# Patient Record
Sex: MALE | Race: WHITE | Employment: OTHER | URBAN - METROPOLITAN AREA
[De-identification: names, ages, dates, MRNs, and addresses within clinical notes are randomized per-mention and may not be internally consistent; named-entity substitution may affect disease eponyms.]

---

## 2017-01-23 ENCOUNTER — HOSPITAL ENCOUNTER (OUTPATIENT)
Dept: NON INVASIVE DIAGNOSTICS | Age: 62
Discharge: HOME | End: 2017-01-23
Attending: NURSE PRACTITIONER | Admitting: NURSE PRACTITIONER

## 2017-01-23 LAB
ALBUMIN SERPL-MCNC: 3.8 G/DL (ref 3.4–5)
ALP BLD-CCNC: 104 U/L (ref 40–129)
ALT SERPL-CCNC: 19 U/L (ref 10–40)
AST SERPL-CCNC: 25 U/L (ref 15–37)
BILIRUB SERPL-MCNC: 0.3 MG/DL (ref 0–1)
BILIRUBIN DIRECT: <0.2 MG/DL (ref 0–0.3)
BILIRUBIN, INDIRECT: NORMAL MG/DL (ref 0–1)
HCT VFR BLD CALC: 43.9 % (ref 40.5–52.5)
HEMOGLOBIN: 14.8 G/DL (ref 13.5–17.5)
MCH RBC QN AUTO: 35.1 PG (ref 26–34)
MCHC RBC AUTO-ENTMCNC: 33.6 G/DL (ref 31–36)
MCV RBC AUTO: 104.6 FL (ref 80–100)
PDW BLD-RTO: 13.5 % (ref 12.4–15.4)
PLATELET # BLD: 230 K/UL (ref 135–450)
PMV BLD AUTO: 9.4 FL (ref 5–10.5)
RBC # BLD: 4.2 M/UL (ref 4.2–5.9)
TOTAL PROTEIN: 6.6 G/DL (ref 6.4–8.2)
WBC # BLD: 7.3 K/UL (ref 4–11)

## 2019-04-14 ENCOUNTER — APPOINTMENT (OUTPATIENT)
Dept: CT IMAGING | Age: 64
DRG: 480 | End: 2019-04-14
Payer: MEDICARE

## 2019-04-14 ENCOUNTER — APPOINTMENT (OUTPATIENT)
Dept: GENERAL RADIOLOGY | Age: 64
DRG: 480 | End: 2019-04-14
Payer: MEDICARE

## 2019-04-14 ENCOUNTER — HOSPITAL ENCOUNTER (INPATIENT)
Age: 64
LOS: 8 days | Discharge: NURSING, REHAB OR LONG-TERM CARE | DRG: 480 | End: 2019-04-22
Attending: EMERGENCY MEDICINE | Admitting: INTERNAL MEDICINE
Payer: MEDICARE

## 2019-04-14 DIAGNOSIS — F10.231 ALCOHOL DEPENDENCE WITH WITHDRAWAL DELIRIUM (HCC): ICD-10-CM

## 2019-04-14 DIAGNOSIS — S22.42XA CLOSED FRACTURE OF MULTIPLE RIBS OF LEFT SIDE, INITIAL ENCOUNTER: ICD-10-CM

## 2019-04-14 DIAGNOSIS — S42.201A CLOSED FRACTURE OF PROXIMAL END OF RIGHT HUMERUS, UNSPECIFIED FRACTURE MORPHOLOGY, INITIAL ENCOUNTER: ICD-10-CM

## 2019-04-14 DIAGNOSIS — S72.121A CLOSED DISPLACED FRACTURE OF LESSER TROCHANTER OF RIGHT FEMUR, INITIAL ENCOUNTER (HCC): Primary | ICD-10-CM
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
## 2019-04-14 PROBLEM — S72.001A CLOSED FRACTURE OF NECK OF RIGHT FEMUR (HCC): Status: ACTIVE | Noted: 2019-04-14

## 2019-04-14 PROBLEM — S72.91XA RIGHT FEMORAL FRACTURE (HCC): Status: ACTIVE | Noted: 2019-04-14
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
## 2019-04-14 PROBLEM — F10.929 ALCOHOL INTOXICATION (HCC): Status: ACTIVE | Noted: 2019-04-14

## 2019-04-14 PROBLEM — F10.20 ALCOHOL DEPENDENCE (HCC): Status: ACTIVE | Noted: 2019-04-14

## 2019-04-14 PROBLEM — G40.909 EPILEPSIA (HCC): Status: ACTIVE | Noted: 2019-04-14

## 2019-04-14 PROBLEM — E83.42 HYPOMAGNESEMIA: Status: ACTIVE | Noted: 2019-04-14

## 2019-04-14 PROBLEM — S22.49XA MULTIPLE RIB FRACTURES: Status: ACTIVE | Noted: 2019-04-14

## 2019-04-14 PROBLEM — S42.301A RIGHT HUMERAL FRACTURE: Status: ACTIVE | Noted: 2019-04-14

## 2019-04-14 LAB
A/G RATIO: 1 (ref 1.1–2.2)
ALBUMIN SERPL-MCNC: 3 G/DL (ref 3.4–5)
ALP BLD-CCNC: 144 U/L (ref 40–129)
ALT SERPL-CCNC: 30 U/L (ref 10–40)
ANION GAP SERPL CALCULATED.3IONS-SCNC: 16 MMOL/L (ref 3–16)
AST SERPL-CCNC: 40 U/L (ref 15–37)
BILIRUB SERPL-MCNC: 0.4 MG/DL (ref 0–1)
BUN BLDV-MCNC: 7 MG/DL (ref 7–20)
CALCIUM SERPL-MCNC: 8 MG/DL (ref 8.3–10.6)
CHLORIDE BLD-SCNC: 93 MMOL/L (ref 99–110)
CO2: 24 MMOL/L (ref 21–32)
CREAT SERPL-MCNC: <0.5 MG/DL (ref 0.8–1.3)
ETHANOL: 75 MG/DL (ref 0–0.08)
GFR AFRICAN AMERICAN: >60
GFR NON-AFRICAN AMERICAN: >60
GLOBULIN: 3 G/DL
GLUCOSE BLD-MCNC: 105 MG/DL (ref 70–99)
HCT VFR BLD CALC: 37.5 % (ref 40.5–52.5)
HEMOGLOBIN: 12.5 G/DL (ref 13.5–17.5)
MAGNESIUM: 1.7 MG/DL (ref 1.8–2.4)
MCH RBC QN AUTO: 34.3 PG (ref 26–34)
MCHC RBC AUTO-ENTMCNC: 33.4 G/DL (ref 31–36)
MCV RBC AUTO: 102.8 FL (ref 80–100)
PDW BLD-RTO: 13.7 % (ref 12.4–15.4)
PLATELET # BLD: 222 K/UL (ref 135–450)
PMV BLD AUTO: 9.7 FL (ref 5–10.5)
POTASSIUM REFLEX MAGNESIUM: 3.5 MMOL/L (ref 3.5–5.1)
RBC # BLD: 3.64 M/UL (ref 4.2–5.9)
SODIUM BLD-SCNC: 133 MMOL/L (ref 136–145)
TOTAL CK: 149 U/L (ref 39–308)
TOTAL PROTEIN: 6 G/DL (ref 6.4–8.2)
TROPONIN: <0.01 NG/ML
WBC # BLD: 14.4 K/UL (ref 4–11)

## 2019-04-14 PROCEDURE — 6360000002 HC RX W HCPCS: Performed by: ORTHOPAEDIC SURGERY

## 2019-04-14 PROCEDURE — 96375 TX/PRO/DX INJ NEW DRUG ADDON: CPT

## 2019-04-14 PROCEDURE — 99285 EMERGENCY DEPT VISIT HI MDM: CPT

## 2019-04-14 PROCEDURE — 6370000000 HC RX 637 (ALT 250 FOR IP): Performed by: INTERNAL MEDICINE

## 2019-04-14 PROCEDURE — 96374 THER/PROPH/DIAG INJ IV PUSH: CPT

## 2019-04-14 PROCEDURE — 2580000003 HC RX 258: Performed by: INTERNAL MEDICINE

## 2019-04-14 PROCEDURE — 73502 X-RAY EXAM HIP UNI 2-3 VIEWS: CPT

## 2019-04-14 PROCEDURE — 1200000000 HC SEMI PRIVATE

## 2019-04-14 PROCEDURE — 71045 X-RAY EXAM CHEST 1 VIEW: CPT

## 2019-04-14 PROCEDURE — 82550 ASSAY OF CK (CPK): CPT

## 2019-04-14 PROCEDURE — 6370000000 HC RX 637 (ALT 250 FOR IP): Performed by: ORTHOPAEDIC SURGERY

## 2019-04-14 PROCEDURE — 93010 ELECTROCARDIOGRAM REPORT: CPT | Performed by: INTERNAL MEDICINE

## 2019-04-14 PROCEDURE — G0480 DRUG TEST DEF 1-7 CLASSES: HCPCS

## 2019-04-14 PROCEDURE — 6360000002 HC RX W HCPCS: Performed by: EMERGENCY MEDICINE

## 2019-04-14 PROCEDURE — 99222 1ST HOSP IP/OBS MODERATE 55: CPT | Performed by: ORTHOPAEDIC SURGERY

## 2019-04-14 PROCEDURE — 84484 ASSAY OF TROPONIN QUANT: CPT

## 2019-04-14 PROCEDURE — 73030 X-RAY EXAM OF SHOULDER: CPT

## 2019-04-14 PROCEDURE — 94760 N-INVAS EAR/PLS OXIMETRY 1: CPT

## 2019-04-14 PROCEDURE — 6360000002 HC RX W HCPCS: Performed by: INTERNAL MEDICINE

## 2019-04-14 PROCEDURE — 80053 COMPREHEN METABOLIC PANEL: CPT

## 2019-04-14 PROCEDURE — 93005 ELECTROCARDIOGRAM TRACING: CPT | Performed by: EMERGENCY MEDICINE

## 2019-04-14 PROCEDURE — 73552 X-RAY EXAM OF FEMUR 2/>: CPT

## 2019-04-14 PROCEDURE — 83735 ASSAY OF MAGNESIUM: CPT

## 2019-04-14 PROCEDURE — 96376 TX/PRO/DX INJ SAME DRUG ADON: CPT

## 2019-04-14 PROCEDURE — 94664 DEMO&/EVAL PT USE INHALER: CPT

## 2019-04-14 PROCEDURE — 85027 COMPLETE CBC AUTOMATED: CPT

## 2019-04-14 PROCEDURE — 70450 CT HEAD/BRAIN W/O DYE: CPT

## 2019-04-14 RX ORDER — OXYCODONE HYDROCHLORIDE 5 MG/1
5 TABLET ORAL EVERY 4 HOURS PRN
Status: DISCONTINUED | OUTPATIENT
Start: 2019-04-14 | End: 2019-04-14

## 2019-04-14 RX ORDER — MORPHINE SULFATE 4 MG/ML
4 INJECTION, SOLUTION INTRAMUSCULAR; INTRAVENOUS
Status: DISCONTINUED | OUTPATIENT
Start: 2019-04-14 | End: 2019-04-22 | Stop reason: HOSPADM

## 2019-04-14 RX ORDER — CYCLOBENZAPRINE HCL 10 MG
10 TABLET ORAL 3 TIMES DAILY PRN
Status: DISCONTINUED | OUTPATIENT
Start: 2019-04-14 | End: 2019-04-22 | Stop reason: HOSPADM

## 2019-04-14 RX ORDER — FAMOTIDINE 20 MG/1
20 TABLET, FILM COATED ORAL 2 TIMES DAILY
Status: DISCONTINUED | OUTPATIENT
Start: 2019-04-14 | End: 2019-04-22 | Stop reason: HOSPADM

## 2019-04-14 RX ORDER — THIAMINE HYDROCHLORIDE 100 MG/ML
100 INJECTION, SOLUTION INTRAMUSCULAR; INTRAVENOUS DAILY
Status: DISCONTINUED | OUTPATIENT
Start: 2019-04-14 | End: 2019-04-14

## 2019-04-14 RX ORDER — ACETAMINOPHEN 325 MG/1
650 TABLET ORAL EVERY 4 HOURS PRN
Status: DISCONTINUED | OUTPATIENT
Start: 2019-04-14 | End: 2019-04-22 | Stop reason: HOSPADM

## 2019-04-14 RX ORDER — SODIUM CHLORIDE 0.9 % (FLUSH) 0.9 %
10 SYRINGE (ML) INJECTION EVERY 12 HOURS SCHEDULED
Status: DISCONTINUED | OUTPATIENT
Start: 2019-04-14 | End: 2019-04-16

## 2019-04-14 RX ORDER — OXYCODONE HYDROCHLORIDE AND ACETAMINOPHEN 5; 325 MG/1; MG/1
1 TABLET ORAL EVERY 4 HOURS PRN
Status: DISCONTINUED | OUTPATIENT
Start: 2019-04-14 | End: 2019-04-22 | Stop reason: HOSPADM

## 2019-04-14 RX ORDER — DOXAZOSIN MESYLATE 4 MG/1
4 TABLET ORAL NIGHTLY
COMMUNITY

## 2019-04-14 RX ORDER — LORAZEPAM 2 MG/ML
1 INJECTION INTRAMUSCULAR ONCE
Status: COMPLETED | OUTPATIENT
Start: 2019-04-14 | End: 2019-04-14

## 2019-04-14 RX ORDER — MULTIVITAMIN WITH FOLIC ACID 400 MCG
1 TABLET ORAL DAILY
Status: DISCONTINUED | OUTPATIENT
Start: 2019-04-14 | End: 2019-04-14

## 2019-04-14 RX ORDER — MULTIVITAMIN WITH FOLIC ACID 400 MCG
1 TABLET ORAL DAILY
Status: DISCONTINUED | OUTPATIENT
Start: 2019-04-17 | End: 2019-04-15

## 2019-04-14 RX ORDER — FOLIC ACID 1 MG/1
1 TABLET ORAL DAILY
Status: DISCONTINUED | OUTPATIENT
Start: 2019-04-17 | End: 2019-04-15

## 2019-04-14 RX ORDER — GABAPENTIN 300 MG/1
300 CAPSULE ORAL 3 TIMES DAILY
Status: DISCONTINUED | OUTPATIENT
Start: 2019-04-14 | End: 2019-04-22 | Stop reason: HOSPADM

## 2019-04-14 RX ORDER — DIAZEPAM 5 MG/ML
5 INJECTION, SOLUTION INTRAMUSCULAR; INTRAVENOUS ONCE
Status: DISCONTINUED | OUTPATIENT
Start: 2019-04-14 | End: 2019-04-14

## 2019-04-14 RX ORDER — SODIUM CHLORIDE 0.9 % (FLUSH) 0.9 %
10 SYRINGE (ML) INJECTION PRN
Status: DISCONTINUED | OUTPATIENT
Start: 2019-04-14 | End: 2019-04-14 | Stop reason: SDUPTHER

## 2019-04-14 RX ORDER — FOLIC ACID 1 MG/1
1 TABLET ORAL DAILY
Status: DISCONTINUED | OUTPATIENT
Start: 2019-04-14 | End: 2019-04-14

## 2019-04-14 RX ORDER — MORPHINE SULFATE 2 MG/ML
4 INJECTION, SOLUTION INTRAMUSCULAR; INTRAVENOUS ONCE
Status: COMPLETED | OUTPATIENT
Start: 2019-04-14 | End: 2019-04-14

## 2019-04-14 RX ORDER — SODIUM CHLORIDE 0.9 % (FLUSH) 0.9 %
10 SYRINGE (ML) INJECTION PRN
Status: DISCONTINUED | OUTPATIENT
Start: 2019-04-14 | End: 2019-04-16

## 2019-04-14 RX ORDER — GABAPENTIN 100 MG/1
100 CAPSULE ORAL 3 TIMES DAILY
Status: DISCONTINUED | OUTPATIENT
Start: 2019-04-14 | End: 2019-04-14

## 2019-04-14 RX ORDER — MORPHINE SULFATE 2 MG/ML
2 INJECTION, SOLUTION INTRAMUSCULAR; INTRAVENOUS
Status: DISCONTINUED | OUTPATIENT
Start: 2019-04-14 | End: 2019-04-22 | Stop reason: HOSPADM

## 2019-04-14 RX ORDER — PHENYTOIN SODIUM 100 MG/1
200 CAPSULE, EXTENDED RELEASE ORAL 2 TIMES DAILY
Status: DISCONTINUED | OUTPATIENT
Start: 2019-04-14 | End: 2019-04-22 | Stop reason: HOSPADM

## 2019-04-14 RX ORDER — ONDANSETRON 2 MG/ML
4 INJECTION INTRAMUSCULAR; INTRAVENOUS EVERY 6 HOURS PRN
Status: DISCONTINUED | OUTPATIENT
Start: 2019-04-14 | End: 2019-04-16

## 2019-04-14 RX ORDER — SODIUM CHLORIDE 0.9 % (FLUSH) 0.9 %
10 SYRINGE (ML) INJECTION EVERY 12 HOURS SCHEDULED
Status: DISCONTINUED | OUTPATIENT
Start: 2019-04-14 | End: 2019-04-14 | Stop reason: SDUPTHER

## 2019-04-14 RX ORDER — MAGNESIUM SULFATE IN WATER 40 MG/ML
2 INJECTION, SOLUTION INTRAVENOUS ONCE
Status: COMPLETED | OUTPATIENT
Start: 2019-04-14 | End: 2019-04-14

## 2019-04-14 RX ORDER — OXYCODONE HYDROCHLORIDE AND ACETAMINOPHEN 5; 325 MG/1; MG/1
2 TABLET ORAL EVERY 4 HOURS PRN
Status: DISCONTINUED | OUTPATIENT
Start: 2019-04-14 | End: 2019-04-22 | Stop reason: HOSPADM

## 2019-04-14 RX ORDER — TAMSULOSIN HYDROCHLORIDE 0.4 MG/1
0.4 CAPSULE ORAL DAILY
Status: DISCONTINUED | OUTPATIENT
Start: 2019-04-14 | End: 2019-04-22 | Stop reason: HOSPADM

## 2019-04-14 RX ADMIN — TAMSULOSIN HYDROCHLORIDE 0.4 MG: 0.4 CAPSULE ORAL at 12:19

## 2019-04-14 RX ADMIN — Medication 10 ML: at 21:33

## 2019-04-14 RX ADMIN — GABAPENTIN 300 MG: 300 CAPSULE ORAL at 13:00

## 2019-04-14 RX ADMIN — MORPHINE SULFATE 4 MG: 4 INJECTION INTRAVENOUS at 17:50

## 2019-04-14 RX ADMIN — MAGNESIUM SULFATE HEPTAHYDRATE 2 G: 40 INJECTION, SOLUTION INTRAVENOUS at 10:15

## 2019-04-14 RX ADMIN — PHENYTOIN SODIUM 200 MG: 100 CAPSULE ORAL at 12:19

## 2019-04-14 RX ADMIN — LORAZEPAM 1 MG: 2 INJECTION, SOLUTION INTRAMUSCULAR; INTRAVENOUS at 08:35

## 2019-04-14 RX ADMIN — MORPHINE SULFATE 4 MG: 4 INJECTION INTRAVENOUS at 12:20

## 2019-04-14 RX ADMIN — FAMOTIDINE 20 MG: 20 TABLET ORAL at 21:32

## 2019-04-14 RX ADMIN — ENOXAPARIN SODIUM 40 MG: 40 INJECTION SUBCUTANEOUS at 12:20

## 2019-04-14 RX ADMIN — Medication 10 ML: at 12:20

## 2019-04-14 RX ADMIN — MORPHINE SULFATE 4 MG: 2 INJECTION, SOLUTION INTRAMUSCULAR; INTRAVENOUS at 04:27

## 2019-04-14 RX ADMIN — PHENYTOIN SODIUM 200 MG: 100 CAPSULE ORAL at 21:32

## 2019-04-14 RX ADMIN — OXYCODONE HYDROCHLORIDE AND ACETAMINOPHEN 2 TABLET: 5; 325 TABLET ORAL at 22:19

## 2019-04-14 RX ADMIN — MORPHINE SULFATE 4 MG: 4 INJECTION INTRAVENOUS at 20:28

## 2019-04-14 RX ADMIN — HYDROMORPHONE HYDROCHLORIDE 0.5 MG: 1 INJECTION, SOLUTION INTRAMUSCULAR; INTRAVENOUS; SUBCUTANEOUS at 06:58

## 2019-04-14 RX ADMIN — FAMOTIDINE 20 MG: 20 TABLET ORAL at 12:19

## 2019-04-14 RX ADMIN — THIAMINE HYDROCHLORIDE 100 MG: 100 INJECTION, SOLUTION INTRAMUSCULAR; INTRAVENOUS at 12:20

## 2019-04-14 RX ADMIN — HYDROMORPHONE HYDROCHLORIDE 0.5 MG: 1 INJECTION, SOLUTION INTRAMUSCULAR; INTRAVENOUS; SUBCUTANEOUS at 05:33

## 2019-04-14 RX ADMIN — GABAPENTIN 300 MG: 300 CAPSULE ORAL at 21:32

## 2019-04-14 ASSESSMENT — PAIN DESCRIPTION - DESCRIPTORS
DESCRIPTORS: CRAMPING;SPASM;SHARP
DESCRIPTORS: THROBBING
DESCRIPTORS: ACHING;DISCOMFORT;SPASM
DESCRIPTORS: ACHING;DISCOMFORT;SPASM
DESCRIPTORS: ACHING
DESCRIPTORS: CRAMPING;SHARP;SPASM
DESCRIPTORS: CRAMPING;SHARP;OTHER (COMMENT)
DESCRIPTORS: SPASM
DESCRIPTORS: ACHING;DISCOMFORT;SPASM

## 2019-04-14 ASSESSMENT — PAIN SCALES - GENERAL
PAINLEVEL_OUTOF10: 0
PAINLEVEL_OUTOF10: 6
PAINLEVEL_OUTOF10: 8
PAINLEVEL_OUTOF10: 9
PAINLEVEL_OUTOF10: 10
PAINLEVEL_OUTOF10: 10
PAINLEVEL_OUTOF10: 8
PAINLEVEL_OUTOF10: 8
PAINLEVEL_OUTOF10: 5
PAINLEVEL_OUTOF10: 8
PAINLEVEL_OUTOF10: 7
PAINLEVEL_OUTOF10: 0
PAINLEVEL_OUTOF10: 10
PAINLEVEL_OUTOF10: 8
PAINLEVEL_OUTOF10: 0
PAINLEVEL_OUTOF10: 6
PAINLEVEL_OUTOF10: 0
PAINLEVEL_OUTOF10: 6
PAINLEVEL_OUTOF10: 8
PAINLEVEL_OUTOF10: 8

## 2019-04-14 ASSESSMENT — PAIN DESCRIPTION - FREQUENCY
FREQUENCY: CONTINUOUS

## 2019-04-14 ASSESSMENT — PAIN - FUNCTIONAL ASSESSMENT
PAIN_FUNCTIONAL_ASSESSMENT: INTOLERABLE, UNABLE TO DO ANY ACTIVE OR PASSIVE ACTIVITIES
PAIN_FUNCTIONAL_ASSESSMENT: PREVENTS OR INTERFERES WITH MANY ACTIVE NOT PASSIVE ACTIVITIES
PAIN_FUNCTIONAL_ASSESSMENT: PREVENTS OR INTERFERES WITH ALL ACTIVE AND SOME PASSIVE ACTIVITIES
PAIN_FUNCTIONAL_ASSESSMENT: INTOLERABLE, UNABLE TO DO ANY ACTIVE OR PASSIVE ACTIVITIES
PAIN_FUNCTIONAL_ASSESSMENT: 0-10
PAIN_FUNCTIONAL_ASSESSMENT: PREVENTS OR INTERFERES SOME ACTIVE ACTIVITIES AND ADLS
PAIN_FUNCTIONAL_ASSESSMENT: INTOLERABLE, UNABLE TO DO ANY ACTIVE OR PASSIVE ACTIVITIES

## 2019-04-14 ASSESSMENT — PAIN DESCRIPTION - PAIN TYPE
TYPE: ACUTE PAIN

## 2019-04-14 ASSESSMENT — PAIN DESCRIPTION - ORIENTATION
ORIENTATION: RIGHT

## 2019-04-14 ASSESSMENT — PAIN DESCRIPTION - LOCATION
LOCATION: LEG;HIP
LOCATION: LEG
LOCATION: LEG;HIP
LOCATION: LEG
LOCATION: ARM;CHEST;LEG
LOCATION: LEG
LOCATION: ARM;CHEST;LEG
LOCATION: LEG;HIP
LOCATION: HIP;LEG
LOCATION: ARM;CHEST;LEG
LOCATION: ARM;LEG;CHEST
LOCATION: HIP;LEG
LOCATION: ARM;LEG;CHEST

## 2019-04-14 ASSESSMENT — PAIN SCALES - PAIN ASSESSMENT IN ADVANCED DEMENTIA (PAINAD)
BODYLANGUAGE: 0
CONSOLABILITY: 0
BREATHING: 0
FACIALEXPRESSION: 0
TOTALSCORE: 0
NEGVOCALIZATION: 0

## 2019-04-14 ASSESSMENT — PAIN DESCRIPTION - ONSET
ONSET: ON-GOING
ONSET: ON-GOING
ONSET: PROGRESSIVE
ONSET: ON-GOING
ONSET: PROGRESSIVE
ONSET: PROGRESSIVE

## 2019-04-14 ASSESSMENT — PAIN DESCRIPTION - PROGRESSION
CLINICAL_PROGRESSION: GRADUALLY IMPROVING
CLINICAL_PROGRESSION: NOT CHANGED
CLINICAL_PROGRESSION: NOT CHANGED
CLINICAL_PROGRESSION: GRADUALLY WORSENING
CLINICAL_PROGRESSION: GRADUALLY IMPROVING
CLINICAL_PROGRESSION: NOT CHANGED

## 2019-04-14 NOTE — LETTER
including skilled nursing facilities, home health agencies, inpatient rehabilitation facilities, and long term care hospitals. [facility] is working closely with the doctors and other health care providers that care for you during and following your hospital stay and for a period of time after you leave the hospital. By working together, the health care providers are trying to more efficiently provide well-managed, high quality, patient-centered care as you undergo treatment. Hospitals, doctors, and other health care providers that care for you following a hospital stay may receive an additional payment for providing better, more coordinated health care. Medicare will monitor your care to make sure you and others get high quality care. Your feedback is important     Medicare may also ask you to answer a survey about the services and care you received from [address] [place] [address] will be mailed to you. Your feedback will improve care for all people with Medicare who receive care from [facility]. Completion of this survey is optional.     Get more information     For more information about the Bundled Payments for [address] [place] [address], you can:    · Visit the CMS BPCI Advanced Website at http://yepez-walton.net/ initiatives/bpci-advanced   · Call the [facility] team at (148) 015-8767. · Call 1-800-MEDICARE (5-632.513.9913). TTY users can call 2-217.226.4082     If you have concerns or complaints about your care, talk to your health care provider, or contact your Beneficiary and Family Centered Quality Improvement Organization [facility]). To get your [facility]'s phone number, visit Medicare.gov/contacts or call 1-800-MEDICARE. · To find a different hospital, visit www. hospitalcompare.[facility].gov or call 1-800[place] MEDICARE (1-965.870.4642). TTY users should call 5-397.570.3685. · To find a different doctor, visit Medicare's Physician Compare website, HDTapes.co.nz, or call 1-800-MEDICARE (596 1009). TTY users should call 8-428.379.6570. · To find a different skilled nursing facility, visit [facility] Medico website, https://www.Clickberry.[employer] [place]ountain/, or call 1-800-MEDICARE (1- 788.976.2857). TTY users should call 5-383.340.8847. · To find a different long term care hospital, visit [facility] 9[facility] Compare website, SmTeleDNAlogTPI Composites.be, or call 1-800- MEDICARE (648 8495). TTY users should call 6-868.600.6738. · To find a different inpatient rehabilitation facility, visit [facility] Compare website, www.medicare.gov/ inpatientrehabilitation facilitycompare, or call 1-800-MEDICARE (8-467.224.1141). TTY users should call 2- 249.330.7426. · To find a different home health agency, visit [address] website, www.medicare.gov/homehealthcompare, or call 1-800-MEDICARE (4-795- 900-4162). TTY users should call [phone].

## 2019-04-14 NOTE — ED NOTES
Assumed care of pt at this time. Report rec'd from Chasidy Flores, [facility]. Pt a & o x 3. Skin w/d, color pink. resp easy. [place] traction in place. (+) pedal pulses RLE. States pain is constant and has sharp spasms intermittently. Pt updated on status and poc.nad.       Guerda Lane RN  04/14/19 0243

## 2019-04-14 NOTE — PROGRESS NOTES
4 Eyes Skin Assessment     The patient is being assess for  Admission    I agree that 2 RN's have performed a thorough Head to Toe Skin Assessment on the patient. ALL assessment sites listed below have been assessed. Areas assessed by both nurses: Moshe//Mamta  [x]   Head, Face, and Ears   [x]   Shoulders, Back, and Chest  [x]   Arms, Elbows, and Hands   [x]   Coccyx, Sacrum, and IschIum  [x]   Legs, Feet, and Heels        Does the Patient have Skin Breakdown?   No         Mahesh Prevention initiated:  Yes   Wound Care Orders initiated:  No      [facility] nurse consulted for Pressure Injury (Stage 3,4, Unstageable, DTI, NWPT, and Complex wounds), New and Established Ostomies:  No      Nurse 1 eSignature: Electronically signed by Justen Gan RN on 4/14/19 at 4:31 PM    **SHARE this note so that the co-signing nurse is able to place an eSignature**    Nurse 2 eSignature: Electronically signed by Avery Flaherty RN on 4/14/19 at 6:59 PM

## 2019-04-14 NOTE — PLAN OF CARE
Problem: Pain:  Goal: Pain level will decrease  Description  Pain level will decrease  Outcome: Ongoing  Note:   Pain/discomfort being managed with PRN analgesics per MD orders. Pt able to express presence and absence of pain and rate pain appropriately using numerical scale. Problem: Fluid Volume - Deficit:  Goal: Absence of fluid volume deficit signs and symptoms  Description  Absence of fluid volume deficit signs and symptoms  Outcome: Ongoing     Problem: Safety:  Goal: Free from accidental physical injury  Description  Free from accidental physical injury  Outcome: Ongoing  Note:   Patient assessed for fall risk; fall precautions initiated. Patient and family instructed about safety devices. Environment kept free of clutter and adequate lighting provided. Bed locked and in lowest position. Call light within reach. Will continue to monitor. Problem: Daily Care:  Goal: Daily care needs are met  Description  Daily care needs are met  Outcome: Ongoing  Note:   Patient's ability assessed to perform self care and independent activity encouraged according to that ability. Assisted with ADL's as needed. Risk for skin breakdown assessed. Potential discharge needs assessed. Patient and family included in daily care decisions. Problem: Risk for Impaired Skin Integrity  Goal: Tissue integrity - skin and mucous membranes  Description  Structural intactness and normal physiological function of skin and  mucous membranes. Outcome: Ongoing  Note:   Skin assessment completed every shift. Pt assessed for incontinence, appropriate barrier cream applied prn. Pt encouraged to turn/rotate every 2 hours. Assistance provided if pt unable to do so themselves. Problem: Falls - Risk of:  Goal: Will remain free from falls  Description  Will remain free from falls  Outcome: Ongoing  Note:   Fall risk assessment completed every shift. All precautions in place. Pt has call light within reach at all times.  Room clear of clutter. Pt aware to call for assistance when getting up.

## 2019-04-14 NOTE — CONSULTS
Orthopaedic Surgery Consult Note      Reason for consult:  Right subtrochanteric femur fracture. Right proximal humerus fracture    Requesting physician: Jc Loving MD    CHIEF COMPLAINT: Right shoulder and hip pain/fracture. HISTORY:  Mr. Tracey Osler is a 70 y.o. right handed male, who presents today to [facility] ER for evaluation of a right shoulder and hip injury. The patient reports that this injury occurred when fell. Patient has history of alcohol abuse, drinks 6 pack of beer daily. He was drinking last night and lost his balance and fell. He could not get up afterwards. He called 911. He was first seen and evaluated in the ER and found to have a displaced subtrochanteric femur fracture and nondisplaced right proximal humerus fracture. Orthopedic consult was requested. Movement and muscles spasms in his leg makes the pain worse. Traction and resting makes the pain better. No numbness or tingling sensation. He does smoke 2ppd. Past Medical History:   Diagnosis Date    BPH (benign prostatic hyperplasia)     Seizures (HCC)        No past surgical history on file.     Current Facility-Administered Medications   Medication Dose Route Frequency Provider Last Rate Last Dose    magnesium sulfate 2 g in 50 mL IVPB premix  2 g Intravenous Once Zach Andres MD         Current Outpatient Medications   Medication Sig Dispense Refill    gabapentin (NEURONTIN) 100 MG capsule Take 100 mg by mouth 3 times daily      phenytoin (DILANTIN) 100 MG ER capsule Take 100 mg by mouth 2 times daily      tamsulosin (FLOMAX) 0.4 MG capsule Take 0.4 mg by mouth daily      predniSONE (DELTASONE) 10 MG tablet Take 6 tablets daily for 6 days, then 4 tablets daily for 3 days, then 2 tablets daily for 3 days 54 tablet 0    famotidine (PEPCID) 20 MG tablet Take 1 tablet by mouth 2 times daily 20 tablet 0       No Known Allergies    Social History     Socioeconomic History    Marital status:      Spouse name: Not on file    Number of children: Not on file    Years of education: Not on file    Highest education level: Not on file   Occupational History    Not on file   Social Needs    Financial resource strain: Not on file    Food insecurity:     Worry: Not on file     Inability: Not on file    Transportation needs:     Medical: Not on file     Non-medical: Not on file   Tobacco Use    Smoking status: Current Every Day Smoker     Packs/day: 1.50     Types: Cigarettes   Substance and Sexual Activity    Alcohol use: Yes     Alcohol/week: 3.6 oz     Types: 6 Cans of beer per week     Comment: normally drinks 6 pack/beer daily    Drug use: Not on file    Sexual activity: Not on file   Lifestyle    Physical activity:     Days per week: Not on file     Minutes per session: Not on file    Stress: Not on file   Relationships    Social connections:     Talks on phone: Not on file     Gets together: Not on file     Attends [employer] service: Not on file     Active member of club or organization: Not on file     Attends meetings of clubs or organizations: Not on file     Relationship status: Not on file    Intimate partner violence:     Fear of current or ex partner: Not on file     Emotionally abused: Not on file     Physically abused: Not on file     Forced sexual activity: Not on file   Other Topics Concern    Not on file   Social History Narrative    Not on file       No family history on file. Review of Systems:   General: negative  Psychological: negative  Ophthalmic: negative  ENT: negative  Hematological and Lymphatic: negative  Endocrine: negative  Respiratory: chest/rib pain  Cardiovascular: negative  Gastrointestinal: negative  Genito-Urinary: negative  Musculoskeletal: negative. See HPI for pertinent positives.   Neurological: negative  Dermatological: negative       PHYSICAL EXAM:  Mr. Kate Anguiano is a 70 y.o. male who presents today in no acute distress, awake. He is poorly oriented. Did not know what part of the day it was. He is slightly slurring his speech. BP (!) 139/106   Pulse 98   Temp 97.4 °F (36.3 °C) (Oral)   Resp 16   Ht 5' 9\" (1.753 m)   Wt 182 lb 5.1 oz (82.7 kg)   SpO2 94%   BMI 26.92 kg/m²     On evaluation of his right lower extremity, there is obvious deformity. There is swelling and is no ecchymosis. He is tender to palpation over the proximal femur, and otherwise nontender over the remainder of the extremity. Range of motion is decreased secondary to pain over the right hip not tested. He is in traction splint. The skin overlying the right hip is intact without evidence of lesion, laceration or abrasion. Distal pulses are 2+ and symmetric bilaterally. Sensation is grossly intact to light touch and symmetric bilaterally. Dorsiflexion / plantarflexion intact bilaterally. On evaluation of his right upper extremity, there is no obvious deformity. There is no swelling and is no ecchymosis. He is tender to palpation over the proximal humerus, and otherwise nontender over the remainder of the extremity. Range of motion is decreased secondary to pain over the right shoulder, and normal in the left shoulder. The skin overlying the right shoulder is intact without evidence of lesion, laceration or abrasion. Distal pulses are 2+ and symmetric bilaterally. Sensation is grossly intact to light touch and symmetric bilaterally. EPL / FPL / Interossei intact bilaterally. Secondary skeletal survey negative. No tenderness to palpation in left arm, left leg. There is no pain with passive motion of neck, left shoulder, elbows, wrists, left hip, left knee, bilateral ankles. Right hip and femur Xrays: reviewed. Displaced angulated subtochanteric femur fracture    Right shoulder xrays: reviewed. Nondisplaced surgical neck fracture    Chest xray: report reviewed.   Displaced left 7th/8th rib

## 2019-04-14 NOTE — H&P
Hospital Medicine History & Physical      PCP: Waldemar Severin, MD    Date of Admission: 4/14/2019    Date of Service: Pt seen/examined on 04/14/2019 and Admitted to Inpatient with expected LOS greater than two midnights due to medical therapy. Chief Complaint:  Fall, right leg pain. History Of Present Illness:      70 y.o. male who presented to [facility] after he had a mechanical fall, admitted to have right hip pain, 10 out of 10 intensity, nonradiating, sharp, worse with any movement, to note that patient is poor historian and he was intoxicated on presentation. Found to have right femoral and right humeral fracture along was 2 broken ribs. Hold on the right side, patient denies nausea, vomiting, abdominal pain or chest pain when examined. Denies any hallucination at this time, drinks on a daily basis. Past Medical History:          Diagnosis Date    BPH (benign prostatic hyperplasia)     Seizures (HCC)        Past Surgical History:      History reviewed. No pertinent surgical history. Medications Prior to Admission:      Prior to Admission medications    Medication Sig Start Date End Date Taking? Authorizing Provider   doxazosin (CARDURA) 4 MG tablet Take 4 mg by mouth nightly   Yes Historical Provider, MD   gabapentin (NEURONTIN) 100 MG capsule Take 300 mg by mouth 3 times daily. Historical Provider, MD   phenytoin (DILANTIN) 100 MG ER capsule Take 200 mg by mouth 2 times daily     Historical Provider, MD   predniSONE (DELTASONE) 10 MG tablet Take 6 tablets daily for 6 days, then 4 tablets daily for 3 days, then 2 tablets daily for 3 days 3/19/16   Alex Dakin, MD   famotidine (PEPCID) 20 MG tablet Take 1 tablet by mouth 2 times daily 3/19/16   Alex Dakin, MD       Allergies:  Patient has no known allergies. Social History:      The patient currently lives at home    TOBACCO:   reports that he has been smoking cigarettes. He has been smoking about 1.50 packs per day. He has never used smokeless tobacco.  ETOH:   reports that he drinks about 3.6 oz of alcohol per week. Family History:      Reviewed in detail and negative for DM    History reviewed. No pertinent family history. REVIEW OF SYSTEMS:   Pertinent positives as noted in the HPI. All other systems reviewed and negative. PHYSICAL EXAM PERFORMED:    BP (!) 162/75   Pulse 114   Temp 97.5 °F (36.4 °C) (Axillary)   Resp 20   Ht 5' 9\" (1.753 m)   Wt 170 lb 13.7 oz (77.5 kg) Comment: including traction device  SpO2 94%   BMI 25.23 kg/m²     General appearance:  No apparent distress  HEENT:  Normal cephalic  Neck: Supple  Respiratory:  Normal respiratory effort. Clear to auscultation, bilaterally without Rales/Wheezes/Rhonchi. Cardiovascular:  Regular rate and rhythm with normal S1/S2 without murmurs, rubs or gallops. Abdomen: Soft, non-tender  Musculoskeletal:  No clubbing, right leg immobilized   Skin: Skin color, texture, turgor normal.  No rashes or lesions. Neurologic:  No obvious weakness   Psychiatric:  Alert   Capillary Refill: Brisk,< 3 seconds   Peripheral Pulses: +2 palpable, equal bilaterally       Labs:     Recent Labs     04/14/19 0414   WBC 14.4*   HGB 12.5*   HCT 37.5*        Recent Labs     04/14/19 0414   *   K 3.5   CL 93*   CO2 24   BUN 7   CREATININE <0.5*   CALCIUM 8.0*     Recent Labs     04/14/19 0414   AST 40*   ALT 30   BILITOT 0.4   ALKPHOS 144*     No results for input(s): INR in the last 72 hours. Recent Labs     04/14/19 0414   CKTOTAL 149   TROPONINI <0.01       Urinalysis:    No results found for: Harris Dulce, BACTERIA, RBCUA, BLOODU, SPECGRAV, GLUCOSEU    Radiology:       XR HIP RIGHT (2-3 VIEWS)   Final Result   Proximal right femoral shaft fracture with suspected involvement of the   lesser trochanter. XR FEMUR RIGHT (MIN 2 VIEWS)   Final Result   Proximal right femur fracture with involvement of the lesser trochanter.          XR SHOULDER RIGHT concerns please feel free to contact me at [phone].

## 2019-04-14 NOTE — ED NOTES
Bed: B-04  Expected date: 4/14/19  Expected time: 3:42 AM  Means of arrival: [facility]  Comments:  63M, right hip and arm pain, intox     Lux Olivares RN  04/14/19 7222

## 2019-04-14 NOTE — ED PROVIDER NOTES
Triage Chief Complaint:   Fall (Fall, +ETOH, right upper leg deformity noted, pt denies LOC, unknown if he hit his head. Also complains of right shoulder pain. PMS intact.); Leg Injury; and Shoulder Pain    [place]:  Popeye Rodriguez is a 70 y.o. male that presents   Acute right shoulder and right hip pain  After fall. He was drinking alcohol last night lost  His balance and fell. He states that he did not his head or lose consciousness but could  Not stand up due to severe pain in the right leg. He drove himself to the phone and called 911. He had deformity of the right lower extremity and is complaining of severe pain in the right leg. He states he did not feel lightheaded or dizzy before falling. He states he has had multiple falls in the past with multiple broken bones. He denies any chest pain or shortness of breath. He is not on anticoagulants. ROS:  At least 10 systems reviewed and otherwise acutely negative except as in the [address] Past Medical History:   Diagnosis Date    BPH (benign prostatic hyperplasia)     Seizures (HCC)      No past surgical history on file. No family history on file. Social History     Socioeconomic History    Marital status:      Spouse name: Not on file    Number of children: Not on file    Years of education: Not on file    Highest education level: Not on file   Occupational History    Not on file   Social Needs    Financial resource strain: Not on file    Food insecurity:     Worry: Not on file     Inability: Not on file    Transportation needs:     Medical: Not on file     Non-medical: Not on file   Tobacco Use    Smoking status: Current Every Day Smoker     Packs/day: 1.50     Types: Cigarettes   Substance and Sexual Activity    Alcohol use:  Yes     Alcohol/week: 3.6 oz     Types: 6 Cans of beer per week     Comment: normally drinks 6 pack/beer daily    Drug use: Not on file    Sexual activity: Not on file   Lifestyle    Physical activity: Days per week: Not on file     Minutes per session: Not on file    Stress: Not on file   Relationships    Social connections:     Talks on phone: Not on file     Gets together: Not on file     Attends [employer] service: Not on file     Active member of club or organization: Not on file     Attends meetings of clubs or organizations: Not on file     Relationship status: Not on file    Intimate partner violence:     Fear of current or ex partner: Not on file     Emotionally abused: Not on file     Physically abused: Not on file     Forced sexual activity: Not on file   Other Topics Concern    Not on file   Social History Narrative    Not on file     Current Facility-Administered Medications   Medication Dose Route Frequency Provider Last Rate Last Dose    magnesium sulfate 2 g in 50 mL IVPB premix  2 g Intravenous Once Zach Del Rio MD         Current Outpatient Medications   Medication Sig Dispense Refill    gabapentin (NEURONTIN) 100 MG capsule Take 100 mg by mouth 3 times daily      phenytoin (DILANTIN) 100 MG ER capsule Take 100 mg by mouth 2 times daily      tamsulosin (FLOMAX) 0.4 MG capsule Take 0.4 mg by mouth daily      predniSONE (DELTASONE) 10 MG tablet Take 6 tablets daily for 6 days, then 4 tablets daily for 3 days, then 2 tablets daily for 3 days 54 tablet 0    famotidine (PEPCID) 20 MG tablet Take 1 tablet by mouth 2 times daily 20 tablet 0     No Known Allergies            Nursing Notes Reviewed    Physical Exam:  Vitals:    04/14/19 0705   BP: 127/73   Pulse: 90   Resp: 16   Temp:    SpO2: 100%       GENERAL APPEARANCE: Awake and alert. Cooperative. He appears uncomfortable and does appear mildly intoxicated. HEAD: Normocephalic. Atraumatic. EYES: EOM's grossly intact. Sclera anicteric. ENT: Mucous membranes are moist. Tolerates saliva. No trismus. No hemotympanum bilaterally. Negative quinones sign. NECK: Supple. No meningismus. Trachea midline.  no midline spinal tenderness. HEART: RRR. Radial pulses 2+. LUNGS: Respirations unlabored. Diffuse expiratory rhonchi present. ABDOMEN: Soft. Non-tender. No guarding or rebound. EXTREMITIES:  There is deformity of the right lower extremity with shortening and significant internal rotation as well as medial angulation of the right femur. SKIN: Warm and dry. NEUROLOGICAL: No gross facial drooping. Moves all 4 extremities spontaneously. He is able to move the toes and plantar and dorsiflex the right foot. He also has good strength in the wrist and right hand. PSYCHIATRIC: Normal mood.     I have reviewed and interpreted all of the currently available lab results from this visit (if applicable):  Results for orders placed or performed during the hospital encounter of 04/14/19   CBC   Result Value Ref Range    WBC 14.4 (H) 4.0 - 11.0 K/uL    RBC 3.64 (L) 4.20 - 5.90 M/uL    Hemoglobin 12.5 (L) 13.5 - 17.5 g/dL    Hematocrit 37.5 (L) 40.5 - 52.5 %    .8 (H) 80.0 - 100.0 fL    MCH 34.3 (H) 26.0 - 34.0 pg    MCHC 33.4 31.0 - 36.0 g/dL    RDW 13.7 12.4 - 15.4 %    Platelets 800 325 - 410 K/uL    MPV 9.7 5.0 - 10.5 fL   Comprehensive Metabolic Panel w/ Reflex to MG   Result Value Ref Range    Sodium 133 (L) 136 - 145 mmol/L    Potassium reflex Magnesium 3.5 3.5 - 5.1 mmol/L    Chloride 93 (L) 99 - 110 mmol/L    CO2 24 21 - 32 mmol/L    Anion Gap 16 3 - 16    Glucose 105 (H) 70 - 99 mg/dL    BUN 7 7 - 20 mg/dL    CREATININE <0.5 (L) 0.8 - 1.3 mg/dL    GFR Non-African American >60 >60    GFR African American >60 >60    Calcium 8.0 (L) 8.3 - 10.6 mg/dL    Total Protein 6.0 (L) 6.4 - 8.2 g/dL    Alb 3.0 (L) 3.4 - 5.0 g/dL    Albumin/Globulin Ratio 1.0 (L) 1.1 - 2.2    Total Bilirubin 0.4 0.0 - 1.0 mg/dL    Alkaline Phosphatase 144 (H) 40 - 129 U/L    ALT 30 10 - 40 U/L    AST 40 (H) 15 - 37 U/L    Globulin 3.0 g/dL   CK   Result Value Ref Range    Total  39 - 308 U/L   Troponin   Result Value Ref Range    Troponin <0.01 <0.01 ng/mL   Ethanol   Result Value Ref Range    Ethanol Lvl 75 mg/dL   Magnesium   Result Value Ref Range    Magnesium 1.70 (L) 1.80 - 2.40 mg/dL        Radiographs (if obtained):  [] The following radiograph was interpreted by myself in the absence of a radiologist:  [x] Radiologist's Report Reviewed:  Xr Shoulder Right (min 2 Views)    Result Date: 4/14/2019  EXAMINATION: 3 XRAY VIEWS OF THE RIGHT SHOULDER 4/14/2019 4:12 am COMPARISON: Right shoulder radiographs 10/01/2008 HISTORY: ORDERING SYSTEM PROVIDED HISTORY: swelling, fall TECHNOLOGIST PROVIDED HISTORY: Reason for exam:->swelling, fall Ordering Physician Provided Reason for Exam: fall; pain Acuity: Acute Type of Exam: Initial FINDINGS: Nondisplaced proximal right humeral fracture. Background degenerative changes about the shoulder. Vascular calcifications overlie the axilla and left lung apex. Included left lung is otherwise clear. Nondisplaced proximal right humeral fracture. Xr Hip Right (2-3 Views)    Result Date: 4/14/2019  EXAMINATION: 2 XRAY VIEWS OF THE RIGHT HIP 4/14/2019 4:12 am COMPARISON: None. HISTORY: ORDERING SYSTEM PROVIDED HISTORY: fall, deformity TECHNOLOGIST PROVIDED HISTORY: Reason for exam:->fall, deformity Ordering Physician Provided Reason for Exam: fall Acuity: Acute Type of Exam: Initial FINDINGS: Displaced angulated proximal right femoral shaft fracture with overlapping segments. Right femoral head is seated anatomically within the acetabulum. Question nondisplaced fracture involving the lesser trochanter. Left hip is intact and in anatomic alignment. Remainder of the bony pelvis is intact. Proximal right femoral shaft fracture with suspected involvement of the lesser trochanter.      Xr Femur Right (min 2 Views)    Result Date: 4/14/2019  EXAMINATION: 3 XRAY VIEWS OF THE RIGHT FEMUR 4/14/2019 4:12 am COMPARISON: Same day right hip radiographs HISTORY: ORDERING SYSTEM PROVIDED HISTORY: fall, deformity TECHNOLOGIST PROVIDED HISTORY: Reason for exam:->fall, deformity Ordering Physician Provided Reason for Exam: fall Acuity: Acute Type of Exam: Initial FINDINGS: Displaced angulated proximal right femoral shaft fracture. The fracture is obliquely oriented and suspected to involve the lesser trochanter. Distal right femur is intact. Vascular calcifications present. Soft tissue swelling overlying the right hip. Proximal right femur fracture with involvement of the lesser trochanter. Ct Head Wo Contrast    No acute intracranial abnormality. Areas of encephalomalacia in the inferior right frontal lobe and anterior right temporal lobe are compatible with sequela of prior trauma. Moderate microvascular ischemic disease. Xr Chest 1 Vw    Result Date: 4/14/2019  EXAMINATION: SINGLE XRAY VIEW OF THE CHEST 4/14/2019 4:36 am COMPARISON: Chest radiographs 03/23/2009 HISTORY: ORDERING SYSTEM PROVIDED HISTORY: preop TECHNOLOGIST PROVIDED HISTORY: Reason for exam:->preop Ordering Physician Provided Reason for Exam: femur fx FINDINGS: Clear lungs. No pneumothorax, pleural effusion or airspace consolidation. Diffuse small airway thickening. Normal heart size and mediastinal contours allowing for patient rotation. Displaced left rib fractures involving at least the left 7th and 8th ribs. Minimally displaced left 7th and 8th rib fractures. No underlying pneumothorax or pleural effusion. Diffuse small airway inflammation may be seen with asthma, bronchitis or smoking. No consolidative airspace disease. EKG (if obtained): (All EKG's are interpreted by myself in the absence of a cardiologist)  Initial EKG on my interpretation shows  Normal sinus rhythm with rate of 93 bpm.  Normal axis and intervals. Nonspecific ST changes present. MDM:  Differential diagnosis:  Likely  Femur fracture with possible right humerus injury. Possible head injury as the patient is mildly intoxicated.      x-ray of the right shoulder, hip,

## 2019-04-14 NOTE — PROGRESS NOTES
Pharmacy Note    Home medication reconciliation  Pt is unable to give me prescription history. Ran an OARRS and was able to verify Gabapentin and pharmacy where he fills. Updated med list after speaking with [place].   Thanks  Brittni Ibarra, [address] [place] [address]

## 2019-04-15 ENCOUNTER — ANESTHESIA (OUTPATIENT)
Dept: OPERATING ROOM | Age: 64
DRG: 480 | End: 2019-04-15
Payer: MEDICARE

## 2019-04-15 ENCOUNTER — APPOINTMENT (OUTPATIENT)
Dept: GENERAL RADIOLOGY | Age: 64
DRG: 480 | End: 2019-04-15
Payer: MEDICARE

## 2019-04-15 ENCOUNTER — ANESTHESIA EVENT (OUTPATIENT)
Dept: OPERATING ROOM | Age: 64
DRG: 480 | End: 2019-04-15
Payer: MEDICARE

## 2019-04-15 LAB
A/G RATIO: 0.8 (ref 1.1–2.2)
ABO/RH: NORMAL
ALBUMIN SERPL-MCNC: 2.3 G/DL (ref 3.4–5)
ALP BLD-CCNC: 122 U/L (ref 40–129)
ALT SERPL-CCNC: 26 U/L (ref 10–40)
ANION GAP SERPL CALCULATED.3IONS-SCNC: 8 MMOL/L (ref 3–16)
ANTIBODY SCREEN: NORMAL
APTT: 30.6 SEC (ref 26–36)
AST SERPL-CCNC: 42 U/L (ref 15–37)
BASOPHILS ABSOLUTE: 0 K/UL (ref 0–0.2)
BASOPHILS RELATIVE PERCENT: 0 %
BILIRUB SERPL-MCNC: 0.5 MG/DL (ref 0–1)
BUN BLDV-MCNC: 13 MG/DL (ref 7–20)
CALCIUM SERPL-MCNC: 7.9 MG/DL (ref 8.3–10.6)
CHLORIDE BLD-SCNC: 99 MMOL/L (ref 99–110)
CO2: 29 MMOL/L (ref 21–32)
CREAT SERPL-MCNC: 0.7 MG/DL (ref 0.8–1.3)
EOSINOPHILS ABSOLUTE: 0 K/UL (ref 0–0.6)
EOSINOPHILS RELATIVE PERCENT: 0 %
GFR AFRICAN AMERICAN: >60
GFR NON-AFRICAN AMERICAN: >60
GLOBULIN: 3 G/DL
GLUCOSE BLD-MCNC: 108 MG/DL (ref 70–99)
HCT VFR BLD CALC: 31 % (ref 40.5–52.5)
HEMOGLOBIN: 10.7 G/DL (ref 13.5–17.5)
INR BLD: 1.02 (ref 0.86–1.14)
LYMPHOCYTES ABSOLUTE: 1.3 K/UL (ref 1–5.1)
LYMPHOCYTES RELATIVE PERCENT: 15 %
MAGNESIUM: 2.1 MG/DL (ref 1.8–2.4)
MCH RBC QN AUTO: 35.8 PG (ref 26–34)
MCHC RBC AUTO-ENTMCNC: 34.6 G/DL (ref 31–36)
MCV RBC AUTO: 103.3 FL (ref 80–100)
MONOCYTES ABSOLUTE: 1 K/UL (ref 0–1.3)
MONOCYTES RELATIVE PERCENT: 12 %
NEUTROPHILS ABSOLUTE: 6.3 K/UL (ref 1.7–7.7)
NEUTROPHILS RELATIVE PERCENT: 73 %
PDW BLD-RTO: 13.7 % (ref 12.4–15.4)
PLATELET # BLD: 168 K/UL (ref 135–450)
PLATELET SLIDE REVIEW: ADEQUATE
PMV BLD AUTO: 9.7 FL (ref 5–10.5)
POTASSIUM REFLEX MAGNESIUM: 4.1 MMOL/L (ref 3.5–5.1)
PROTHROMBIN TIME: 11.6 SEC (ref 9.8–13)
RBC # BLD: 3 M/UL (ref 4.2–5.9)
RBC # BLD: NORMAL 10*6/UL
SLIDE REVIEW: ABNORMAL
SODIUM BLD-SCNC: 136 MMOL/L (ref 136–145)
TOTAL PROTEIN: 5.3 G/DL (ref 6.4–8.2)
WBC # BLD: 8.6 K/UL (ref 4–11)

## 2019-04-15 PROCEDURE — 85730 THROMBOPLASTIN TIME PARTIAL: CPT

## 2019-04-15 PROCEDURE — 99223 1ST HOSP IP/OBS HIGH 75: CPT | Performed by: INTERNAL MEDICINE

## 2019-04-15 PROCEDURE — 6370000000 HC RX 637 (ALT 250 FOR IP): Performed by: ORTHOPAEDIC SURGERY

## 2019-04-15 PROCEDURE — 2580000003 HC RX 258: Performed by: INTERNAL MEDICINE

## 2019-04-15 PROCEDURE — 6370000000 HC RX 637 (ALT 250 FOR IP): Performed by: INTERNAL MEDICINE

## 2019-04-15 PROCEDURE — 86901 BLOOD TYPING SEROLOGIC RH(D): CPT

## 2019-04-15 PROCEDURE — 2700000000 HC OXYGEN THERAPY PER DAY

## 2019-04-15 PROCEDURE — 94761 N-INVAS EAR/PLS OXIMETRY MLT: CPT

## 2019-04-15 PROCEDURE — 80053 COMPREHEN METABOLIC PANEL: CPT

## 2019-04-15 PROCEDURE — 71045 X-RAY EXAM CHEST 1 VIEW: CPT

## 2019-04-15 PROCEDURE — 6360000002 HC RX W HCPCS: Performed by: ORTHOPAEDIC SURGERY

## 2019-04-15 PROCEDURE — 83735 ASSAY OF MAGNESIUM: CPT

## 2019-04-15 PROCEDURE — 85025 COMPLETE CBC W/AUTO DIFF WBC: CPT

## 2019-04-15 PROCEDURE — 86900 BLOOD TYPING SEROLOGIC ABO: CPT

## 2019-04-15 PROCEDURE — 6360000002 HC RX W HCPCS: Performed by: INTERNAL MEDICINE

## 2019-04-15 PROCEDURE — 86850 RBC ANTIBODY SCREEN: CPT

## 2019-04-15 PROCEDURE — 2500000003 HC RX 250 WO HCPCS: Performed by: INTERNAL MEDICINE

## 2019-04-15 PROCEDURE — 85610 PROTHROMBIN TIME: CPT

## 2019-04-15 PROCEDURE — 94640 AIRWAY INHALATION TREATMENT: CPT

## 2019-04-15 PROCEDURE — 1200000000 HC SEMI PRIVATE

## 2019-04-15 PROCEDURE — 51702 INSERT TEMP BLADDER CATH: CPT

## 2019-04-15 PROCEDURE — 36415 COLL VENOUS BLD VENIPUNCTURE: CPT

## 2019-04-15 RX ORDER — LORAZEPAM 2 MG/ML
3 INJECTION INTRAMUSCULAR
Status: DISCONTINUED | OUTPATIENT
Start: 2019-04-15 | End: 2019-04-22 | Stop reason: HOSPADM

## 2019-04-15 RX ORDER — SODIUM CHLORIDE 0.9 % (FLUSH) 0.9 %
10 SYRINGE (ML) INJECTION PRN
Status: DISCONTINUED | OUTPATIENT
Start: 2019-04-15 | End: 2019-04-16

## 2019-04-15 RX ORDER — LORAZEPAM 1 MG/1
4 TABLET ORAL
Status: DISCONTINUED | OUTPATIENT
Start: 2019-04-15 | End: 2019-04-22 | Stop reason: HOSPADM

## 2019-04-15 RX ORDER — AZITHROMYCIN 250 MG/1
250 TABLET, FILM COATED ORAL DAILY
Status: DISCONTINUED | OUTPATIENT
Start: 2019-04-15 | End: 2019-04-22 | Stop reason: HOSPADM

## 2019-04-15 RX ORDER — SODIUM CHLORIDE 0.9 % (FLUSH) 0.9 %
10 SYRINGE (ML) INJECTION EVERY 12 HOURS SCHEDULED
Status: DISCONTINUED | OUTPATIENT
Start: 2019-04-15 | End: 2019-04-16

## 2019-04-15 RX ORDER — LORAZEPAM 1 MG/1
1 TABLET ORAL
Status: DISCONTINUED | OUTPATIENT
Start: 2019-04-15 | End: 2019-04-22 | Stop reason: HOSPADM

## 2019-04-15 RX ORDER — IPRATROPIUM BROMIDE AND ALBUTEROL SULFATE 2.5; .5 MG/3ML; MG/3ML
1 SOLUTION RESPIRATORY (INHALATION)
Status: DISCONTINUED | OUTPATIENT
Start: 2019-04-15 | End: 2019-04-22 | Stop reason: HOSPADM

## 2019-04-15 RX ORDER — PREDNISONE 20 MG/1
40 TABLET ORAL DAILY
Status: DISCONTINUED | OUTPATIENT
Start: 2019-04-15 | End: 2019-04-16

## 2019-04-15 RX ORDER — LORAZEPAM 2 MG/ML
4 INJECTION INTRAMUSCULAR
Status: DISCONTINUED | OUTPATIENT
Start: 2019-04-15 | End: 2019-04-22 | Stop reason: HOSPADM

## 2019-04-15 RX ORDER — LORAZEPAM 1 MG/1
3 TABLET ORAL
Status: DISCONTINUED | OUTPATIENT
Start: 2019-04-15 | End: 2019-04-22 | Stop reason: HOSPADM

## 2019-04-15 RX ORDER — LORAZEPAM 2 MG/ML
1 INJECTION INTRAMUSCULAR
Status: DISCONTINUED | OUTPATIENT
Start: 2019-04-15 | End: 2019-04-22 | Stop reason: HOSPADM

## 2019-04-15 RX ORDER — LORAZEPAM 2 MG/ML
2 INJECTION INTRAMUSCULAR
Status: DISCONTINUED | OUTPATIENT
Start: 2019-04-15 | End: 2019-04-22 | Stop reason: HOSPADM

## 2019-04-15 RX ORDER — LORAZEPAM 1 MG/1
2 TABLET ORAL
Status: DISCONTINUED | OUTPATIENT
Start: 2019-04-15 | End: 2019-04-22 | Stop reason: HOSPADM

## 2019-04-15 RX ADMIN — GABAPENTIN 300 MG: 300 CAPSULE ORAL at 20:54

## 2019-04-15 RX ADMIN — GABAPENTIN 300 MG: 300 CAPSULE ORAL at 09:36

## 2019-04-15 RX ADMIN — FOLIC ACID: 5 INJECTION, SOLUTION INTRAMUSCULAR; INTRAVENOUS; SUBCUTANEOUS at 12:10

## 2019-04-15 RX ADMIN — ENOXAPARIN SODIUM 40 MG: 40 INJECTION SUBCUTANEOUS at 17:39

## 2019-04-15 RX ADMIN — OXYCODONE HYDROCHLORIDE AND ACETAMINOPHEN 2 TABLET: 5; 325 TABLET ORAL at 03:54

## 2019-04-15 RX ADMIN — AZITHROMYCIN 250 MG: 250 TABLET, FILM COATED ORAL at 17:38

## 2019-04-15 RX ADMIN — PHENYTOIN SODIUM 200 MG: 100 CAPSULE ORAL at 20:54

## 2019-04-15 RX ADMIN — MORPHINE SULFATE 4 MG: 4 INJECTION INTRAVENOUS at 09:36

## 2019-04-15 RX ADMIN — IPRATROPIUM BROMIDE AND ALBUTEROL SULFATE 1 AMPULE: .5; 3 SOLUTION RESPIRATORY (INHALATION) at 20:03

## 2019-04-15 RX ADMIN — PHENYTOIN SODIUM 200 MG: 100 CAPSULE ORAL at 09:36

## 2019-04-15 RX ADMIN — Medication 10 ML: at 21:02

## 2019-04-15 RX ADMIN — MORPHINE SULFATE 4 MG: 4 INJECTION INTRAVENOUS at 11:57

## 2019-04-15 RX ADMIN — FAMOTIDINE 20 MG: 20 TABLET ORAL at 20:54

## 2019-04-15 RX ADMIN — FAMOTIDINE 20 MG: 20 TABLET ORAL at 09:36

## 2019-04-15 RX ADMIN — PREDNISONE 40 MG: 20 TABLET ORAL at 17:39

## 2019-04-15 RX ADMIN — LORAZEPAM 1 MG: 2 INJECTION INTRAMUSCULAR; INTRAVENOUS at 23:39

## 2019-04-15 RX ADMIN — IPRATROPIUM BROMIDE AND ALBUTEROL SULFATE 1 AMPULE: .5; 3 SOLUTION RESPIRATORY (INHALATION) at 15:47

## 2019-04-15 RX ADMIN — MORPHINE SULFATE 4 MG: 4 INJECTION INTRAVENOUS at 00:12

## 2019-04-15 RX ADMIN — Medication 10 ML: at 21:03

## 2019-04-15 ASSESSMENT — PAIN SCALES - PAIN ASSESSMENT IN ADVANCED DEMENTIA (PAINAD)
FACIALEXPRESSION: 0
BODYLANGUAGE: 0
CONSOLABILITY: 0
TOTALSCORE: 0
NEGVOCALIZATION: 0
BREATHING: 0

## 2019-04-15 ASSESSMENT — PAIN DESCRIPTION - ONSET
ONSET: ON-GOING

## 2019-04-15 ASSESSMENT — PAIN DESCRIPTION - PROGRESSION
CLINICAL_PROGRESSION: NOT CHANGED
CLINICAL_PROGRESSION: NOT CHANGED
CLINICAL_PROGRESSION: GRADUALLY WORSENING
CLINICAL_PROGRESSION: GRADUALLY WORSENING

## 2019-04-15 ASSESSMENT — PAIN DESCRIPTION - LOCATION
LOCATION: ARM;HIP
LOCATION: ARM;CHEST;LEG
LOCATION: ARM;CHEST;LEG

## 2019-04-15 ASSESSMENT — PAIN DESCRIPTION - ORIENTATION
ORIENTATION: RIGHT

## 2019-04-15 ASSESSMENT — PAIN DESCRIPTION - FREQUENCY
FREQUENCY: CONTINUOUS

## 2019-04-15 ASSESSMENT — PAIN - FUNCTIONAL ASSESSMENT
PAIN_FUNCTIONAL_ASSESSMENT: INTOLERABLE, UNABLE TO DO ANY ACTIVE OR PASSIVE ACTIVITIES
PAIN_FUNCTIONAL_ASSESSMENT: PREVENTS OR INTERFERES WITH ALL ACTIVE AND SOME PASSIVE ACTIVITIES
PAIN_FUNCTIONAL_ASSESSMENT: INTOLERABLE, UNABLE TO DO ANY ACTIVE OR PASSIVE ACTIVITIES

## 2019-04-15 ASSESSMENT — PAIN SCALES - GENERAL
PAINLEVEL_OUTOF10: 9
PAINLEVEL_OUTOF10: 7
PAINLEVEL_OUTOF10: 0
PAINLEVEL_OUTOF10: 0
PAINLEVEL_OUTOF10: 7
PAINLEVEL_OUTOF10: 6
PAINLEVEL_OUTOF10: 0
PAINLEVEL_OUTOF10: 10

## 2019-04-15 ASSESSMENT — PAIN DESCRIPTION - PAIN TYPE
TYPE: ACUTE PAIN

## 2019-04-15 ASSESSMENT — PAIN DESCRIPTION - DESCRIPTORS
DESCRIPTORS: ACHING
DESCRIPTORS: ACHING

## 2019-04-15 ASSESSMENT — LIFESTYLE VARIABLES: SMOKING_STATUS: 1

## 2019-04-15 NOTE — ANESTHESIA PRE PROCEDURE
mg  3 mg Oral Q1H PRN Adina Solares MD        Or    LORazepam (ATIVAN) injection 3 mg  3 mg Intravenous Q1H PRN Adina Solares MD        Or    LORazepam (ATIVAN) tablet 4 mg  4 mg Oral Q1H PRN Adina Solares MD        Or    LORazepam (ATIVAN) injection 4 mg  4 mg Intravenous Q1H PRN Adina Solares MD        ceFAZolin (ANCEF) 2 g in dextrose 5 % 100 mL IVPB  2 g Intravenous On Call to YESY Carmona CNP        ipratropium-albuterol (DUONEB) nebulizer solution 1 ampule  1 ampule Inhalation Q4H JAZZ Solares MD        famotidine (PEPCID) tablet 20 mg  20 mg Oral BID Zach Andres MD   20 mg at 04/15/19 0936    phenytoin (DILANTIN) ER capsule 200 mg  200 mg Oral BID Zach Andres MD   200 mg at 04/15/19 0936    tamsulosin (FLOMAX) capsule 0.4 mg  0.4 mg Oral Daily Zach Andres MD   0.4 mg at 04/14/19 1219    sodium chloride flush 0.9 % injection 10 mL  10 mL Intravenous 2 times per day Zach Miranda MD   10 mL at 04/14/19 2133    sodium chloride flush 0.9 % injection 10 mL  10 mL Intravenous PRN Zach Andres MD        magnesium hydroxide (MILK OF MAGNESIA) 400 MG/5ML suspension 30 mL  30 mL Oral Daily PRN Zach Andres MD        ondansetron (ZOFRAN) injection 4 mg  4 mg Intravenous Q6H PRN Zach Andres MD        acetaminophen (TYLENOL) tablet 650 mg  650 mg Oral Q4H PRN Zach Andres MD        cyclobenzaprine (FLEXERIL) tablet 10 mg  10 mg Oral TID PRN Zunilda Rascon MD        oxyCODONE-acetaminophen (PERCOCET) 5-325 MG per tablet 1 tablet  1 tablet Oral Q4H PRN Zunilda Rascon MD        Or    oxyCODONE-acetaminophen (PERCOCET) 5-325 MG per tablet 2 tablet  2 tablet Oral Q4H PRN Zunilda Rascon MD   2 tablet at 04/15/19 0354    morphine (PF) injection 2 mg  2 mg Intravenous Q2H PRN Zunilda Rascon MD        Or   [facility] morphine (PF) injection 4 mg  4 mg Intravenous Q2H PRN Zunilda Rascon MD   4 mg at 04/15/19 1157    gabapentin (NEURONTIN) capsule 300 mg  300 mg Oral TID Zach Andres MD   300 mg at 04/15/19 0936       Allergies:  No Known Allergies    Problem List:    Patient Active Problem List   Diagnosis Code    Fall W19. XXXA    Right humeral fracture S42.301A    Right femoral fracture (HCC) S72.91XA    Alcohol intoxication ([facility] F10.929    Hypomagnesemia E83.42    Epilepsia ([facility] G40.909    Closed fracture of neck of right femur ([facility] S72.001A    Alcohol dependence ([facility] F10.20    Multiple rib fractures S22.49XA       Past Medical History:        Diagnosis Date    BPH (benign prostatic hyperplasia)     Seizures (HCC)        Past Surgical History:  History reviewed. No pertinent surgical history. Social History:    Social History     Tobacco Use    Smoking status: Current Every Day Smoker     Packs/day: 1.50     Types: Cigarettes    Smokeless tobacco: Never Used   Substance Use Topics    Alcohol use:  Yes     Alcohol/week: 3.6 oz     Types: 6 Cans of beer per week     Comment: normally drinks 6beers a day minimum, 18 max                                Ready to quit: Not Answered  Counseling given: Not Answered      Vital Signs (Current):   Vitals:    04/15/19 0354 04/15/19 0400 04/15/19 0743 04/15/19 1133   BP: 128/76  105/69 123/76   Pulse: 104  99 99   Resp: 16  16 16   Temp: 97.3 °F (36.3 °C)  98 °F (36.7 °C) 98.1 °F (36.7 °C)   TempSrc: Oral  Oral Oral   SpO2: (!) 85% 93% 100% 100%   Weight:  168 lb 3.4 oz (76.3 kg)     Height:                                                  BP Readings from Last 3 Encounters:   04/15/19 123/76   03/19/16 145/80       NPO Status: Time of last liquid consumption: 0900                        Time of last solid consumption: 2350                        Date of last liquid consumption: 04/15/19                        Date of last solid food consumption: 04/14/19    BMI:   Wt Readings from Last 3 Encounters:   04/15/19 168 lb 3.4 oz (76.3 kg)   03/19/16 180 lb (81.6 kg)     Body mass index is 24.84 kg/m². CBC:   Lab Results   Component Value Date    WBC 8.6 04/15/2019    RBC 3.00 04/15/2019    HGB 10.7 04/15/2019    HCT 31.0 04/15/2019    .3 04/15/2019    RDW 13.7 04/15/2019     04/15/2019       CMP:   Lab Results   Component Value Date     04/15/2019    K 4.1 04/15/2019    CL 99 04/15/2019    CO2 29 04/15/2019    BUN 13 04/15/2019    CREATININE 0.7 04/15/2019    GFRAA >60 04/15/2019    AGRATIO 0.8 04/15/2019    LABGLOM >60 04/15/2019    GLUCOSE 108 04/15/2019    PROT 5.3 04/15/2019    CALCIUM 7.9 04/15/2019    BILITOT 0.5 04/15/2019    ALKPHOS 122 04/15/2019    AST 42 04/15/2019    ALT 26 04/15/2019       POC Tests: No results for input(s): POCGLU, POCNA, POCK, POCCL, POCBUN, POCHEMO, POCHCT in the last 72 hours. Coags:   Lab Results   Component Value Date    PROTIME 11.6 04/15/2019    INR 1.02 04/15/2019    APTT 30.6 04/15/2019       HCG (If Applicable): No results found for: PREGTESTUR, PREGSERUM, HCG, HCGQUANT     ABGs: No results found for: PHART, PO2ART, VPA8RLN, YVZ6EDP, BEART, Y4WLQSNL     Type & Screen (If Applicable):  No results found for: [facility]    Anesthesia Evaluation  Patient summary reviewed no history of anesthetic complications:   Airway:        Comment: uncooperative   Dental:          Pulmonary:   (+) sleep apnea: on noncompliant,  wheezes,  rales,  current smoker    (-) COPD and asthma                           Cardiovascular:            Rhythm: regular                      Neuro/Psych:   (+) seizures:, psychiatric history:   (-) TIA and CVA            ROS comment: EtOH abuse GI/Hepatic/Renal:             Endo/Other:                     Abdominal:   (+) obese,         Vascular:     - DVT and PE. Anesthesia Plan        (Case cancelled 2/2 inability to obtain consent.   Patient would also benefit from cardiac and pulmonary workup prior to surgery.)                            [facility]

## 2019-04-15 NOTE — CONSULTS
[address] [place] [address]                                  CONSULTATION    PATIENT NAME: Eleanor Miller                 :        1955  MED REC NO:   [id number]                          ROOM:       3102  ACCOUNT NO:   [de-identified]                           ADMIT DATE: 2019  PROVIDER:     Shruthi Monroe MD    CONSULT DATE:  04/15/2019    HISTORY OF PRESENT ILLNESS:  This is a 69-year-old male who presented to  the hospital after he apparently fell, complaining of right hip pain. He has been found to have right femoral and right humeral fracture along  with broken ribs. The patient was supposed to have surgery for his  femoral fracture but it was canceled due to his confusional status and  inability to give informed consent. The patient supposedly had passed  out though I am unable to obtain any history out of the patient as he is  currently heavily sedated and apparently also is quite confused. REVIEW OF SYSTEMS:  Please see above. The rest of the systems could not  be reviewed due to the patient's confusional status. PAST MEDICAL HISTORY:  1. The patient has a history of benign prostatic hypertrophy. 2.  Seizure disorder. 3.  Alcoholism. SURGICAL HISTORY:  No pertinent surgical history. SOCIAL HISTORY:  The patient lives at home and has a history of  significant alcohol usage at home. There is no known history of smoking  per the chart. PHYSICAL EXAMINATION:  This is a 69-year-old male who is currently  sedated. VITAL SIGNS:  His pulse is 97, blood pressure is 134/72, respirations  are 16, oxygen saturation 98%, temperature is 98 degrees Fahrenheit. CONSTITUTIONAL:  The patient is sedated and I am unable to assess his  consciousness level. HEENT:  His neck is supple. I am not appreciating any jugular venous  pulse. No carotid bruits.   CARDIAC:  Examination reveals regular rate and rhythm. There is no  gallop, murmur, or rub. LUNGS:  Reveal slightly diminished breath sounds bilaterally. ABDOMEN:  Soft, nontender. Bowel sounds are present. EXTREMITIES:  His right leg is immobilized. SKIN:  Shows no rashes. LABORATORY DATA:  Sodium is 136, potassium 4.1, chloride 99, bicarb 29,  BUN is 13, creatinine 0.7. Albumin is 2.3. Liver functions are all  normal.  Hemoglobin is 10.7 and hematocrit is 31 which is down from two  days ago, from 12.5 to 10.7. IMAGING:  EKG shows sinus rhythm, nonspecific ST and T wave  abnormalities. IMPRESSION:  1. This is a 47-year-old male who apparently has fallen. Possible  etiology is syncope though unable to obtain adequate history regarding  this. The patient has a history of heavy alcohol usage. Underlying  structural heart disease will have to be excluded. 2.  History of alcoholism. RECOMMENDATIONS:  I will obtain an echocardiogram early morning. We  will give further recommendation after reviewing his echocardiogram in  the morning and then we will hopefully clear the patient for his surgery  which is scheduled for tomorrow afternoon. I appreciate the opportunity to participate in the care of this pleasant  male.     With warm regards,        Ro Pool MD    D: 04/15/2019 17:11:41       T: 04/15/2019 19:18:17     [email or web address]  Job#: [id number]     Doc#: [id number]    CC:  <>

## 2019-04-15 NOTE — CARE COORDINATION
SW follow to see pt for dc plan and alcohol use. Pt confused today.   SW did not see today but is following  Electronically signed by [id number] SARITA De Jesus on 4/15/2019 at 3:37 PM

## 2019-04-15 NOTE — PROGRESS NOTES
[facility] Orthopedic Surgery   Progress Note      S/P :  SUBJECTIVE  In bed. Zhane. Also reported \"a lot of men in this warehouse staring at him\" Understands he fell and has fx but poor recall of events. . Pain is   described in right hip more than right shoulder and with the intensity of moderate at rest. Pain is described as aching, pressure. OBJECTIVE              Physical                      VITALS:  /69   Pulse 99   Temp 98 °F (36.7 °C) (Oral)   Resp 16   Ht 5' 9\" (1.753 m)   Wt 168 lb 3.4 oz (76.3 kg)   SpO2 100%   BMI 24.84 kg/m²                     MUSCULOSKELETAL:  Right hand and right foot NVI, warm and pink with brisk cap refill noted. Right leg shortened compared to left side.                     NEUROLOGIC:                                  Sensory:  Touch:  Right Upper Extremity:  normal  Right Lower Extremity:  normal                                                 Data       CBC:   Lab Results   Component Value Date    WBC 8.6 04/15/2019    RBC 3.00 04/15/2019    HGB 10.7 04/15/2019    HCT 31.0 04/15/2019    .3 04/15/2019    MCH 35.8 04/15/2019    MCHC 34.6 04/15/2019    RDW 13.7 04/15/2019     04/15/2019    MPV 9.7 04/15/2019        WBC:    Lab Results   Component Value Date    WBC 8.6 04/15/2019        Hemoglobin/Hematocrit:    Lab Results   Component Value Date    HGB 10.7 04/15/2019    HCT 31.0 04/15/2019        PT/INR:    Lab Results   Component Value Date    PROTIME 11.6 04/15/2019    INR 1.02 04/15/2019              Current Inpatient Medications             Current Facility-Administered Medications: sodium chloride flush 0.9 % injection 10 mL, 10 mL, Intravenous, 2 times per day  sodium chloride flush 0.9 % injection 10 mL, 10 mL, Intravenous, PRN  sodium chloride 0.9 % 2,206 mL with folic acid 1 mg, adult multi-vitamin with vitamin k 10 mL, thiamine 100 mg, , Intravenous, Daily  LORazepam (ATIVAN) tablet 1 mg, 1 mg, Oral, Q1H PRN **OR** LORazepam (ATIVAN) injection 1 mg, 1 mg, Intravenous, Q1H PRN **OR** LORazepam (ATIVAN) tablet 2 mg, 2 mg, Oral, Q1H PRN **OR** LORazepam (ATIVAN) injection 2 mg, 2 mg, Intravenous, Q1H PRN **OR** LORazepam (ATIVAN) tablet 3 mg, 3 mg, Oral, Q1H PRN **OR** LORazepam (ATIVAN) injection 3 mg, 3 mg, Intravenous, Q1H PRN **OR** LORazepam (ATIVAN) tablet 4 mg, 4 mg, Oral, Q1H PRN **OR** LORazepam (ATIVAN) injection 4 mg, 4 mg, Intravenous, Q1H PRN  famotidine (PEPCID) tablet 20 mg, 20 mg, Oral, BID  phenytoin (DILANTIN) ER capsule 200 mg, 200 mg, Oral, BID  tamsulosin (FLOMAX) capsule 0.4 mg, 0.4 mg, Oral, Daily  sodium chloride flush 0.9 % injection 10 mL, 10 mL, Intravenous, 2 times per day  sodium chloride flush 0.9 % injection 10 mL, 10 mL, Intravenous, PRN  magnesium hydroxide (MILK OF MAGNESIA) 400 MG/5ML suspension 30 mL, 30 mL, Oral, Daily PRN  ondansetron (ZOFRAN) injection 4 mg, 4 mg, Intravenous, Q6H PRN  acetaminophen (TYLENOL) tablet 650 mg, 650 mg, Oral, Q4H PRN  cyclobenzaprine (FLEXERIL) tablet 10 mg, 10 mg, Oral, TID PRN  oxyCODONE-acetaminophen (PERCOCET) 5-325 MG per tablet 1 tablet, 1 tablet, Oral, Q4H PRN **OR** oxyCODONE-acetaminophen (PERCOCET) 5-325 MG per tablet 2 tablet, 2 tablet, Oral, Q4H PRN  morphine (PF) injection 2 mg, 2 mg, Intravenous, Q2H PRN **OR** morphine (PF) injection 4 mg, 4 mg, Intravenous, Q2H PRN  gabapentin (NEURONTIN) capsule 300 mg, 300 mg, Oral, TID  sodium chloride 0.9 % 6,942 mL with folic acid 1 mg, adult multi-vitamin with vitamin k 10 mL, thiamine 100 mg, , Intravenous, Daily    ASSESSMENT AND PLAN    Fall  Right prox humerus fx, nondisplaced Will need sling for immobility, NWB  Right ST hip fx. Plan ORIF today per Dr Nicole Padilla  NPO for OR  Hallucination and confusion intermittent. Drinks 6+ beers a day. Added CIWA scale.          Darlin Duque  4/15/2019  9:22 AM

## 2019-04-15 NOTE — PROGRESS NOTES
Spoke with Dr Srinivasan Gilliam from Anesthesia. Patient still disoriented, doesn't know the year. Annette Young is president. Dr. Srinivasan Gilliam does not think he can give informed consent. Have tried to reach family, but not able to. Dr. Srinivasan Gilliam has requested pulmonary and cardiac consult. Will add patient on to OR schedule again for tomorrow afternoon.         Electronically signed by Marcelino Young MD on 4/15/2019 at 2:58 PM

## 2019-04-15 NOTE — PROGRESS NOTES
Progress Note  Admit Date: 2019      PCP: Jorge Perdomo MD     CC: F/U for fall    SUBJECTIVE / Interval History:   Has cough with yellow sputum  Confused , oriented only to self      Allergies  Patient has no known allergies. Medications    Scheduled Meds:   famotidine  20 mg Oral BID    phenytoin  200 mg Oral BID    tamsulosin  0.4 mg Oral Daily    sodium chloride flush  10 mL Intravenous 2 times per day    thiamine (VITAMIN B1) IVPB  100 mg Intravenous Q24H    gabapentin  300 mg Oral TID    folic acid, thiamine, multi-vitamin with vitamin K infusion   Intravenous Daily    [START ON ] folic acid  1 mg Oral Daily    [START ON 2019] multivitamin  1 tablet Oral Daily     Continuous Infusions:    PRN Meds:  sodium chloride flush, magnesium hydroxide, ondansetron, acetaminophen, cyclobenzaprine, oxyCODONE-acetaminophen **OR** oxyCODONE-acetaminophen, morphine **OR** morphine    Vitals    TEMPERATURE:  Current - Temp: 98 °F (36.7 °C); Max - Temp  Av.9 °F (36.6 °C)  Min: 97.3 °F (36.3 °C)  Max: 98.5 °F (36.9 °C)  RESPIRATIONS RANGE: Resp  Av  Min: 16  Max: 20  PULSE RANGE: Pulse  Av.6  Min: 66  Max: 116  BLOOD PRESSURE RANGE:  Systolic (72WVV), YD , Min:92 , PUQ:012   ; Diastolic (30MAT), KAA:86, Min:49, Max:76    PULSE OXIMETRY RANGE: SpO2  Av.5 %  Min: 85 %  Max: 100 %  24HR INTAKE/OUTPUT:      Intake/Output Summary (Last 24 hours) at 4/15/2019 0907  Last data filed at 2019 1250  Gross per 24 hour   Intake 120 ml   Output --   Net 120 ml       Exam:    Gen: No distress. Eyes: PERRL. No sclera icterus. No conjunctival injection. ENT: No discharge. Pharynx clear. External appearance of ears and nose normal.  Neck: Trachea midline. No obvious mass. Resp: No accessory muscle use. No crackles. No wheezes. Few rhonchi. No dullness on percussion. CV: Regular rate. Regular rhythm. No murmur or rub. No edema. GI: Non-tender. Non-distended. No hernia. Skin: Warm, dry, normal texture and turgor. Rash + L arm  Lymph: No cervical LAD. No supraclavicular LAD. M/S: No cyanosis. No clubbing. R arm in sling, . Neuro: Moves all four extremities. CN 2-12 tested, no defect noted. Psych: Oriented x 1. Confused hallucinations +    Data    LABS  CBC:   Recent Labs     04/14/19  0414 04/15/19  0520   WBC 14.4* 8.6   HGB 12.5* 10.7*   HCT 37.5* 31.0*   .8* 103.3*    168     BMP:   Recent Labs     04/14/19  0414 04/15/19  0520   * 136   K 3.5 4.1   CL 93* 99   CO2 24 29   BUN 7 13   CREATININE <0.5* 0.7*     POC GLUCOSE:  No results for input(s): POCGLU in the last 72 hours. LIVER PROFILE:   Recent Labs     04/14/19  0414 04/15/19  0520   AST 40* 42*   ALT 30 26   LABALBU 3.0* 2.3*   BILITOT 0.4 0.5   ALKPHOS 144* 122     PT/INR:   Recent Labs     04/15/19  0520   PROTIME 11.6   INR 1.02     APTT:   Recent Labs     04/15/19  0520   APTT 30.6     UA:No results for input(s): NITRITE, COLORU, PHUR, LABCAST, WBCUA, RBCUA, MUCUS, TRICHOMONAS, YEAST, BACTERIA, CLARITYU, SPECGRAV, LEUKOCYTESUR, UROBILINOGEN, BILIRUBINUR, BLOODU, GLUCOSEU, KETUA, AMORPHOUS in the last 72 hours. Microbiology:  Wound Culture: No results for input(s): WNDABS, ORG in the last 72 hours. Invalid input(s):  LABGRAM  Nasal Culture: No results for input(s): ORG, MRSAPCR in the last 72 hours. Blood Culture: No results for input(s): BC, BLOODCULT2 in the last 72 hours. Fungal Culture:   No results for input(s): FUNGSM in the last 72 hours. No results for input(s): FUNCXBLD in the last 72 hours. CSF Culture:  No results for input(s): COLORCSF, APPEARCSF, CFTUBE, CLOTCSF, WBCCSF, RBCCSF, NEUTCSF, NUMCELLSCSF, LYMPHSCSF, MONOCSF, GLUCCSF, VOLCSF in the last 72 hours. Respiratory Culture:  No results for input(s): Flora Solares in the last 72 hours. AFB:No results for input(s): AFBSMEAR in the last 72 hours.   Urine Culture  No results for input(s): LABURIN in the last 72 History of epilepsy.  -ct home meds     Hypomagnesemia, replaced. Cough with yellow sputum- treat as bronchitis with h/o smoking  - duo neb  -ssi        DVT Prophylaxis: lovenox  Diet: Diet NPO Effective Now  Code Status: Full Code    PT/OT Eval Status:post surgery    Discharge plan - ct care     The patient and / or the family were informed of the results of any tests, a time was given to answer questions, a plan was proposed and they agreed with plan. Discussed with consulting physicians, nursing and social work     The note was completed using EMR. Every effort was made to ensure accuracy; however, inadvertent computerized transcription errors may be present.        Cornelius Chilel MD

## 2019-04-16 ENCOUNTER — APPOINTMENT (OUTPATIENT)
Dept: GENERAL RADIOLOGY | Age: 64
DRG: 480 | End: 2019-04-16
Payer: MEDICARE

## 2019-04-16 VITALS
TEMPERATURE: 94.8 F | RESPIRATION RATE: 16 BRPM | SYSTOLIC BLOOD PRESSURE: 123 MMHG | OXYGEN SATURATION: 100 % | DIASTOLIC BLOOD PRESSURE: 70 MMHG

## 2019-04-16 LAB
LV EF: 58 %
LVEF MODALITY: NORMAL
PROCALCITONIN: 0.32 NG/ML (ref 0–0.15)

## 2019-04-16 PROCEDURE — 2580000003 HC RX 258: Performed by: ORTHOPAEDIC SURGERY

## 2019-04-16 PROCEDURE — 94640 AIRWAY INHALATION TREATMENT: CPT

## 2019-04-16 PROCEDURE — 6370000000 HC RX 637 (ALT 250 FOR IP): Performed by: INTERNAL MEDICINE

## 2019-04-16 PROCEDURE — 2500000003 HC RX 250 WO HCPCS: Performed by: NURSE ANESTHETIST, CERTIFIED REGISTERED

## 2019-04-16 PROCEDURE — 6360000002 HC RX W HCPCS: Performed by: INTERNAL MEDICINE

## 2019-04-16 PROCEDURE — 3209999900 FLUORO FOR SURGICAL PROCEDURES

## 2019-04-16 PROCEDURE — 2580000003 HC RX 258: Performed by: INTERNAL MEDICINE

## 2019-04-16 PROCEDURE — 3600000004 HC SURGERY LEVEL 4 BASE: Performed by: ORTHOPAEDIC SURGERY

## 2019-04-16 PROCEDURE — 93306 TTE W/DOPPLER COMPLETE: CPT

## 2019-04-16 PROCEDURE — 7100000001 HC PACU RECOVERY - ADDTL 15 MIN: Performed by: ORTHOPAEDIC SURGERY

## 2019-04-16 PROCEDURE — 3700000000 HC ANESTHESIA ATTENDED CARE: Performed by: ORTHOPAEDIC SURGERY

## 2019-04-16 PROCEDURE — 36415 COLL VENOUS BLD VENIPUNCTURE: CPT

## 2019-04-16 PROCEDURE — 99233 SBSQ HOSP IP/OBS HIGH 50: CPT | Performed by: INTERNAL MEDICINE

## 2019-04-16 PROCEDURE — 2500000003 HC RX 250 WO HCPCS: Performed by: ORTHOPAEDIC SURGERY

## 2019-04-16 PROCEDURE — 73502 X-RAY EXAM HIP UNI 2-3 VIEWS: CPT

## 2019-04-16 PROCEDURE — 1200000000 HC SEMI PRIVATE

## 2019-04-16 PROCEDURE — 6360000002 HC RX W HCPCS: Performed by: ORTHOPAEDIC SURGERY

## 2019-04-16 PROCEDURE — 6360000002 HC RX W HCPCS: Performed by: NURSE ANESTHETIST, CERTIFIED REGISTERED

## 2019-04-16 PROCEDURE — 2580000003 HC RX 258: Performed by: NURSE ANESTHETIST, CERTIFIED REGISTERED

## 2019-04-16 PROCEDURE — 27245 TREAT THIGH FRACTURE: CPT | Performed by: ORTHOPAEDIC SURGERY

## 2019-04-16 PROCEDURE — 7100000000 HC PACU RECOVERY - FIRST 15 MIN: Performed by: ORTHOPAEDIC SURGERY

## 2019-04-16 PROCEDURE — 23600 CLTX PROX HUMRL FX W/O MNPJ: CPT | Performed by: ORTHOPAEDIC SURGERY

## 2019-04-16 PROCEDURE — 3700000001 HC ADD 15 MINUTES (ANESTHESIA): Performed by: ORTHOPAEDIC SURGERY

## 2019-04-16 PROCEDURE — 84145 PROCALCITONIN (PCT): CPT

## 2019-04-16 PROCEDURE — 2500000003 HC RX 250 WO HCPCS: Performed by: INTERNAL MEDICINE

## 2019-04-16 PROCEDURE — 0QS606Z REPOSITION RIGHT UPPER FEMUR WITH INTRAMEDULLARY INTERNAL FIXATION DEVICE, OPEN APPROACH: ICD-10-PCS | Performed by: ORTHOPAEDIC SURGERY

## 2019-04-16 PROCEDURE — C1713 ANCHOR/SCREW BN/BN,TIS/BN: HCPCS | Performed by: ORTHOPAEDIC SURGERY

## 2019-04-16 PROCEDURE — 2709999900 HC NON-CHARGEABLE SUPPLY: Performed by: ORTHOPAEDIC SURGERY

## 2019-04-16 PROCEDURE — 99223 1ST HOSP IP/OBS HIGH 75: CPT | Performed by: INTERNAL MEDICINE

## 2019-04-16 PROCEDURE — 6370000000 HC RX 637 (ALT 250 FOR IP): Performed by: ORTHOPAEDIC SURGERY

## 2019-04-16 PROCEDURE — 2720000010 HC SURG SUPPLY STERILE: Performed by: ORTHOPAEDIC SURGERY

## 2019-04-16 PROCEDURE — 3600000014 HC SURGERY LEVEL 4 ADDTL 15MIN: Performed by: ORTHOPAEDIC SURGERY

## 2019-04-16 PROCEDURE — 94761 N-INVAS EAR/PLS OXIMETRY MLT: CPT

## 2019-04-16 PROCEDURE — C1769 GUIDE WIRE: HCPCS | Performed by: ORTHOPAEDIC SURGERY

## 2019-04-16 PROCEDURE — 2700000000 HC OXYGEN THERAPY PER DAY

## 2019-04-16 DEVICE — LOCKING SCREW
Type: IMPLANTABLE DEVICE | Site: HIP | Status: FUNCTIONAL
Brand: T2 ALPHA

## 2019-04-16 DEVICE — FEMORAL NAIL GT, RIGHT
Type: IMPLANTABLE DEVICE | Site: HIP | Status: FUNCTIONAL
Brand: T2 ALPHA

## 2019-04-16 DEVICE — IMPLANTABLE DEVICE: Type: IMPLANTABLE DEVICE | Site: HIP | Status: FUNCTIONAL

## 2019-04-16 RX ORDER — ROCURONIUM BROMIDE 10 MG/ML
INJECTION, SOLUTION INTRAVENOUS PRN
Status: DISCONTINUED | OUTPATIENT
Start: 2019-04-16 | End: 2019-04-16 | Stop reason: SDUPTHER

## 2019-04-16 RX ORDER — ONDANSETRON 2 MG/ML
4 INJECTION INTRAMUSCULAR; INTRAVENOUS EVERY 6 HOURS PRN
Status: DISCONTINUED | OUTPATIENT
Start: 2019-04-16 | End: 2019-04-22 | Stop reason: HOSPADM

## 2019-04-16 RX ORDER — EPHEDRINE SULFATE/0.9% NACL/PF 50 MG/5 ML
SYRINGE (ML) INTRAVENOUS PRN
Status: DISCONTINUED | OUTPATIENT
Start: 2019-04-16 | End: 2019-04-16 | Stop reason: SDUPTHER

## 2019-04-16 RX ORDER — PHENYLEPHRINE HYDROCHLORIDE 10 MG/ML
INJECTION INTRAVENOUS PRN
Status: DISCONTINUED | OUTPATIENT
Start: 2019-04-16 | End: 2019-04-16 | Stop reason: SDUPTHER

## 2019-04-16 RX ORDER — SODIUM CHLORIDE 0.9 % (FLUSH) 0.9 %
10 SYRINGE (ML) INJECTION PRN
Status: DISCONTINUED | OUTPATIENT
Start: 2019-04-16 | End: 2019-04-22 | Stop reason: HOSPADM

## 2019-04-16 RX ORDER — SODIUM CHLORIDE 9 MG/ML
INJECTION, SOLUTION INTRAVENOUS CONTINUOUS PRN
Status: DISCONTINUED | OUTPATIENT
Start: 2019-04-16 | End: 2019-04-16 | Stop reason: SDUPTHER

## 2019-04-16 RX ORDER — ONDANSETRON 2 MG/ML
4 INJECTION INTRAMUSCULAR; INTRAVENOUS
Status: DISCONTINUED | OUTPATIENT
Start: 2019-04-16 | End: 2019-04-16 | Stop reason: HOSPADM

## 2019-04-16 RX ORDER — MAGNESIUM HYDROXIDE 1200 MG/15ML
LIQUID ORAL CONTINUOUS PRN
Status: COMPLETED | OUTPATIENT
Start: 2019-04-16 | End: 2019-04-16

## 2019-04-16 RX ORDER — FENTANYL CITRATE 50 UG/ML
INJECTION, SOLUTION INTRAMUSCULAR; INTRAVENOUS PRN
Status: DISCONTINUED | OUTPATIENT
Start: 2019-04-16 | End: 2019-04-16 | Stop reason: SDUPTHER

## 2019-04-16 RX ORDER — SUCCINYLCHOLINE/SOD CL,ISO/PF 200MG/10ML
SYRINGE (ML) INTRAVENOUS PRN
Status: DISCONTINUED | OUTPATIENT
Start: 2019-04-16 | End: 2019-04-16 | Stop reason: SDUPTHER

## 2019-04-16 RX ORDER — PROPOFOL 10 MG/ML
INJECTION, EMULSION INTRAVENOUS PRN
Status: DISCONTINUED | OUTPATIENT
Start: 2019-04-16 | End: 2019-04-16 | Stop reason: SDUPTHER

## 2019-04-16 RX ORDER — ONDANSETRON 2 MG/ML
INJECTION INTRAMUSCULAR; INTRAVENOUS PRN
Status: DISCONTINUED | OUTPATIENT
Start: 2019-04-16 | End: 2019-04-16 | Stop reason: SDUPTHER

## 2019-04-16 RX ORDER — GLYCOPYRROLATE 0.2 MG/ML
INJECTION INTRAMUSCULAR; INTRAVENOUS PRN
Status: DISCONTINUED | OUTPATIENT
Start: 2019-04-16 | End: 2019-04-16 | Stop reason: SDUPTHER

## 2019-04-16 RX ORDER — DEXAMETHASONE SODIUM PHOSPHATE 4 MG/ML
INJECTION, SOLUTION INTRA-ARTICULAR; INTRALESIONAL; INTRAMUSCULAR; INTRAVENOUS; SOFT TISSUE PRN
Status: DISCONTINUED | OUTPATIENT
Start: 2019-04-16 | End: 2019-04-16 | Stop reason: SDUPTHER

## 2019-04-16 RX ORDER — SODIUM CHLORIDE 0.9 % (FLUSH) 0.9 %
10 SYRINGE (ML) INJECTION EVERY 12 HOURS SCHEDULED
Status: DISCONTINUED | OUTPATIENT
Start: 2019-04-16 | End: 2019-04-22 | Stop reason: HOSPADM

## 2019-04-16 RX ORDER — MIDAZOLAM HYDROCHLORIDE 1 MG/ML
INJECTION INTRAMUSCULAR; INTRAVENOUS PRN
Status: DISCONTINUED | OUTPATIENT
Start: 2019-04-16 | End: 2019-04-16 | Stop reason: SDUPTHER

## 2019-04-16 RX ORDER — SODIUM CHLORIDE 9 MG/ML
INJECTION, SOLUTION INTRAVENOUS CONTINUOUS
Status: DISCONTINUED | OUTPATIENT
Start: 2019-04-16 | End: 2019-04-20

## 2019-04-16 RX ORDER — FENTANYL CITRATE 50 UG/ML
25 INJECTION, SOLUTION INTRAMUSCULAR; INTRAVENOUS EVERY 5 MIN PRN
Status: DISCONTINUED | OUTPATIENT
Start: 2019-04-16 | End: 2019-04-16 | Stop reason: HOSPADM

## 2019-04-16 RX ORDER — LIDOCAINE HYDROCHLORIDE 20 MG/ML
INJECTION, SOLUTION EPIDURAL; INFILTRATION; INTRACAUDAL; PERINEURAL PRN
Status: DISCONTINUED | OUTPATIENT
Start: 2019-04-16 | End: 2019-04-16 | Stop reason: SDUPTHER

## 2019-04-16 RX ORDER — BUPIVACAINE HYDROCHLORIDE 5 MG/ML
INJECTION, SOLUTION EPIDURAL; INTRACAUDAL
Status: COMPLETED | OUTPATIENT
Start: 2019-04-16 | End: 2019-04-16

## 2019-04-16 RX ORDER — OXYCODONE HYDROCHLORIDE AND ACETAMINOPHEN 5; 325 MG/1; MG/1
2 TABLET ORAL PRN
Status: DISCONTINUED | OUTPATIENT
Start: 2019-04-16 | End: 2019-04-16 | Stop reason: HOSPADM

## 2019-04-16 RX ORDER — DOCUSATE SODIUM 100 MG/1
100 CAPSULE, LIQUID FILLED ORAL 2 TIMES DAILY
Status: DISCONTINUED | OUTPATIENT
Start: 2019-04-16 | End: 2019-04-22 | Stop reason: HOSPADM

## 2019-04-16 RX ORDER — NEOSTIGMINE METHYLSULFATE 1 MG/ML
INJECTION, SOLUTION INTRAVENOUS PRN
Status: DISCONTINUED | OUTPATIENT
Start: 2019-04-16 | End: 2019-04-16 | Stop reason: SDUPTHER

## 2019-04-16 RX ORDER — OXYCODONE HYDROCHLORIDE AND ACETAMINOPHEN 5; 325 MG/1; MG/1
1 TABLET ORAL PRN
Status: DISCONTINUED | OUTPATIENT
Start: 2019-04-16 | End: 2019-04-16 | Stop reason: HOSPADM

## 2019-04-16 RX ADMIN — IPRATROPIUM BROMIDE AND ALBUTEROL SULFATE 1 AMPULE: .5; 3 SOLUTION RESPIRATORY (INHALATION) at 19:24

## 2019-04-16 RX ADMIN — LIDOCAINE HYDROCHLORIDE 40 MG: 20 INJECTION, SOLUTION EPIDURAL; INFILTRATION; INTRACAUDAL; PERINEURAL at 14:53

## 2019-04-16 RX ADMIN — LORAZEPAM 1 MG: 2 INJECTION INTRAMUSCULAR; INTRAVENOUS at 05:11

## 2019-04-16 RX ADMIN — PHENYLEPHRINE HYDROCHLORIDE 100 MCG: 10 INJECTION INTRAVENOUS at 16:19

## 2019-04-16 RX ADMIN — ROCURONIUM BROMIDE 10 MG: 10 INJECTION INTRAVENOUS at 15:06

## 2019-04-16 RX ADMIN — Medication 10 MG: at 16:32

## 2019-04-16 RX ADMIN — PROPOFOL 50 MG: 10 INJECTION, EMULSION INTRAVENOUS at 15:22

## 2019-04-16 RX ADMIN — FOLIC ACID: 5 INJECTION, SOLUTION INTRAMUSCULAR; INTRAVENOUS; SUBCUTANEOUS at 12:23

## 2019-04-16 RX ADMIN — PHENYLEPHRINE HYDROCHLORIDE 100 MCG: 10 INJECTION INTRAVENOUS at 15:52

## 2019-04-16 RX ADMIN — PROPOFOL 80 MG: 10 INJECTION, EMULSION INTRAVENOUS at 14:53

## 2019-04-16 RX ADMIN — Medication 20 MG: at 14:55

## 2019-04-16 RX ADMIN — FENTANYL CITRATE 100 MCG: 50 INJECTION INTRAMUSCULAR; INTRAVENOUS at 14:53

## 2019-04-16 RX ADMIN — PHENYLEPHRINE HYDROCHLORIDE 100 MCG: 10 INJECTION INTRAVENOUS at 15:35

## 2019-04-16 RX ADMIN — ROCURONIUM BROMIDE 20 MG: 10 INJECTION INTRAVENOUS at 14:56

## 2019-04-16 RX ADMIN — IPRATROPIUM BROMIDE AND ALBUTEROL SULFATE 1 AMPULE: .5; 3 SOLUTION RESPIRATORY (INHALATION) at 07:52

## 2019-04-16 RX ADMIN — Medication 10 MG: at 16:19

## 2019-04-16 RX ADMIN — DEXAMETHASONE SODIUM PHOSPHATE 4 MG: 4 INJECTION, SOLUTION INTRAMUSCULAR; INTRAVENOUS at 15:24

## 2019-04-16 RX ADMIN — MIDAZOLAM 2 MG: 1 INJECTION INTRAMUSCULAR; INTRAVENOUS at 14:49

## 2019-04-16 RX ADMIN — PHENYLEPHRINE HYDROCHLORIDE 100 MCG: 10 INJECTION INTRAVENOUS at 15:32

## 2019-04-16 RX ADMIN — ONDANSETRON 4 MG: 2 INJECTION INTRAMUSCULAR; INTRAVENOUS at 15:59

## 2019-04-16 RX ADMIN — SODIUM CHLORIDE: 9 INJECTION, SOLUTION INTRAVENOUS at 14:34

## 2019-04-16 RX ADMIN — SODIUM CHLORIDE: 9 INJECTION, SOLUTION INTRAVENOUS at 18:14

## 2019-04-16 RX ADMIN — NEOSTIGMINE 4 MG: 1 INJECTION INTRAVENOUS at 16:18

## 2019-04-16 RX ADMIN — LORAZEPAM 2 MG: 2 INJECTION INTRAMUSCULAR; INTRAVENOUS at 23:18

## 2019-04-16 RX ADMIN — Medication 100 MG: at 14:53

## 2019-04-16 RX ADMIN — Medication 10 MG: at 16:37

## 2019-04-16 RX ADMIN — Medication 10 ML: at 11:22

## 2019-04-16 RX ADMIN — GLYCOPYRROLATE 0.6 MG: 0.2 INJECTION, SOLUTION INTRAMUSCULAR; INTRAVENOUS at 16:18

## 2019-04-16 RX ADMIN — IPRATROPIUM BROMIDE AND ALBUTEROL SULFATE 1 AMPULE: .5; 3 SOLUTION RESPIRATORY (INHALATION) at 12:36

## 2019-04-16 RX ADMIN — PHENYTOIN SODIUM 200 MG: 100 CAPSULE ORAL at 11:21

## 2019-04-16 RX ADMIN — AMPICILLIN SODIUM AND SULBACTAM SODIUM 1.5 G: 1; .5 INJECTION, POWDER, FOR SOLUTION INTRAMUSCULAR; INTRAVENOUS at 11:22

## 2019-04-16 RX ADMIN — FENTANYL CITRATE 100 MCG: 50 INJECTION INTRAMUSCULAR; INTRAVENOUS at 15:22

## 2019-04-16 ASSESSMENT — PAIN SCALES - PAIN ASSESSMENT IN ADVANCED DEMENTIA (PAINAD)
TOTALSCORE: 0
NEGVOCALIZATION: 0
FACIALEXPRESSION: 0
TOTALSCORE: 0
NEGVOCALIZATION: 0
BODYLANGUAGE: 0
NEGVOCALIZATION: 0
TOTALSCORE: 0
CONSOLABILITY: 0
CONSOLABILITY: 0
BREATHING: 0
BODYLANGUAGE: 0
NEGVOCALIZATION: 0
TOTALSCORE: 0
FACIALEXPRESSION: 0
BODYLANGUAGE: 0
BODYLANGUAGE: 0
BREATHING: 0
CONSOLABILITY: 0
BREATHING: 0
FACIALEXPRESSION: 0
CONSOLABILITY: 0
BREATHING: 0
FACIALEXPRESSION: 0

## 2019-04-16 ASSESSMENT — ENCOUNTER SYMPTOMS: SHORTNESS OF BREATH: 1

## 2019-04-16 ASSESSMENT — PULMONARY FUNCTION TESTS
PIF_VALUE: 22
PIF_VALUE: 22
PIF_VALUE: 0
PIF_VALUE: 21
PIF_VALUE: 22
PIF_VALUE: 3
PIF_VALUE: 21
PIF_VALUE: 22
PIF_VALUE: 21
PIF_VALUE: 23
PIF_VALUE: 22
PIF_VALUE: 24
PIF_VALUE: 28
PIF_VALUE: 19
PIF_VALUE: 21
PIF_VALUE: 24
PIF_VALUE: 22
PIF_VALUE: 21
PIF_VALUE: 36
PIF_VALUE: 24
PIF_VALUE: 22
PIF_VALUE: 24
PIF_VALUE: 21
PIF_VALUE: 22
PIF_VALUE: 21
PIF_VALUE: 22
PIF_VALUE: 24
PIF_VALUE: 23
PIF_VALUE: 23
PIF_VALUE: 25
PIF_VALUE: 2
PIF_VALUE: 21
PIF_VALUE: 23
PIF_VALUE: 25
PIF_VALUE: 22
PIF_VALUE: 21
PIF_VALUE: 22
PIF_VALUE: 21
PIF_VALUE: 21
PIF_VALUE: 24
PIF_VALUE: 38
PIF_VALUE: 25
PIF_VALUE: 24
PIF_VALUE: 21
PIF_VALUE: 25
PIF_VALUE: 21
PIF_VALUE: 22
PIF_VALUE: 21
PIF_VALUE: 21
PIF_VALUE: 22
PIF_VALUE: 22
PIF_VALUE: 21
PIF_VALUE: 22
PIF_VALUE: 21
PIF_VALUE: 21
PIF_VALUE: 27
PIF_VALUE: 23
PIF_VALUE: 20
PIF_VALUE: 23
PIF_VALUE: 21
PIF_VALUE: 24
PIF_VALUE: 21
PIF_VALUE: 24
PIF_VALUE: 25
PIF_VALUE: 24
PIF_VALUE: 21
PIF_VALUE: 22
PIF_VALUE: 27
PIF_VALUE: 21
PIF_VALUE: 25
PIF_VALUE: 21
PIF_VALUE: 22
PIF_VALUE: 21
PIF_VALUE: 20
PIF_VALUE: 21
PIF_VALUE: 22
PIF_VALUE: 22
PIF_VALUE: 21
PIF_VALUE: 21
PIF_VALUE: 41
PIF_VALUE: 20
PIF_VALUE: 22
PIF_VALUE: 24
PIF_VALUE: 22
PIF_VALUE: 21
PIF_VALUE: 22
PIF_VALUE: 21
PIF_VALUE: 21
PIF_VALUE: 0
PIF_VALUE: 22

## 2019-04-16 ASSESSMENT — PAIN SCALES - GENERAL
PAINLEVEL_OUTOF10: 0

## 2019-04-16 ASSESSMENT — PAIN - FUNCTIONAL ASSESSMENT: PAIN_FUNCTIONAL_ASSESSMENT: 0-10

## 2019-04-16 ASSESSMENT — LIFESTYLE VARIABLES: SMOKING_STATUS: 1

## 2019-04-16 NOTE — PROGRESS NOTES
To pacu from OR. Pt asleep. Wakes briefly. Denies pain. Dressings to right hip with sero sang drainage noted - scant amts. Ice to right hip. Right pedal pulse palpable. Right arm in sling. Right radial pulse palpable. IV infusing. Monitor in sinus rhythm.

## 2019-04-16 NOTE — ANESTHESIA PRE PROCEDURE
Department of Anesthesiology  Preprocedure Note       Name:  Kandy Arce   Age:  70 y.o.  :  1955                                          MRN:  [id number]         Date:  2019      Surgeon: Jose Cunningham):  Bob Torres MD    Procedure: INTRAMEDULLARY NAILING RIGHT FEMUR (Right )    Medications prior to admission:   Prior to Admission medications    Medication Sig Start Date End Date Taking? Authorizing Provider   doxazosin (CARDURA) 4 MG tablet Take 4 mg by mouth nightly   Yes Historical Provider, MD   gabapentin (NEURONTIN) 100 MG capsule Take 300 mg by mouth 3 times daily.      Historical Provider, MD   phenytoin (DILANTIN) 100 MG ER capsule Take 200 mg by mouth 2 times daily     Historical Provider, MD   predniSONE (DELTASONE) 10 MG tablet Take 6 tablets daily for 6 days, then 4 tablets daily for 3 days, then 2 tablets daily for 3 days 3/19/16   Ayaka Mora MD   famotidine (PEPCID) 20 MG tablet Take 1 tablet by mouth 2 times daily 3/19/16   Ayaka Mora MD       Current medications:    Current Facility-Administered Medications   Medication Dose Route Frequency Provider Last Rate Last Dose    ampicillin-sulbactam (UNASYN) 1.5 g IVPB minibag  1.5 g Intravenous Q6H Amanda Pan DO   Stopped at 19 1155    sodium chloride flush 0.9 % injection 10 mL  10 mL Intravenous 2 times per day Adina Solares MD   10 mL at 19 1122    sodium chloride flush 0.9 % injection 10 mL  10 mL Intravenous PRN Adina Solares MD        sodium chloride 0.9 % 0,813 mL with folic acid 1 mg, adult multi-vitamin with vitamin k 10 mL, thiamine 100 mg   Intravenous Daily Adina Solares  mL/hr at 19 1223      LORazepam (ATIVAN) tablet 1 mg  1 mg Oral Q1H PRN Adina Solares MD        Or    LORazepam (ATIVAN) injection 1 mg  1 mg Intravenous Q1H PRN Adina Solares MD   1 mg at 19 0511    Or    LORazepam (ATIVAN) tablet 2 mg  2 mg Oral Q1H PRN Viktor Mcclelland MD        Or    LORazepam (ATIVAN) injection 2 mg  2 mg Intravenous Q1H PRN Adina Solares MD        Or    LORazepam (ATIVAN) tablet 3 mg  3 mg Oral Q1H PRN Adina Solaers MD        Or    LORazepam (ATIVAN) injection 3 mg  3 mg Intravenous Q1H PRN Adina Solares MD        Or    LORazepam (ATIVAN) tablet 4 mg  4 mg Oral Q1H PRN Adina Solares MD        Or    LORazepam (ATIVAN) injection 4 mg  4 mg Intravenous Q1H PRN Adina Solares MD        ipratropium-albuterol (DUONEB) nebulizer solution 1 ampule  1 ampule Inhalation Q4H WA Adina Solares MD   1 ampule at 04/16/19 1236    azithromycin (ZITHROMAX) tablet 250 mg  250 mg Oral Daily Adina Solares MD   250 mg at 04/15/19 1738    famotidine (PEPCID) tablet 20 mg  20 mg Oral BID Zach Andres MD   20 mg at 04/15/19 2054    phenytoin (DILANTIN) ER capsule 200 mg  200 mg Oral BID Zach Andres MD   200 mg at 04/16/19 1121    tamsulosin (FLOMAX) capsule 0.4 mg  0.4 mg Oral Daily Zach Andres MD   0.4 mg at 04/14/19 1219    sodium chloride flush 0.9 % injection 10 mL  10 mL Intravenous 2 times per day Zach Beatty MD   10 mL at 04/15/19 2103    sodium chloride flush 0.9 % injection 10 mL  10 mL Intravenous PRN Zach Andres MD        magnesium hydroxide (MILK OF MAGNESIA) 400 MG/5ML suspension 30 mL  30 mL Oral Daily PRN Zach Andres MD        ondansetron (ZOFRAN) injection 4 mg  4 mg Intravenous Q6H PRN Zach Andres MD        acetaminophen (TYLENOL) tablet 650 mg  650 mg Oral Q4H PRN Zach Andres MD        cyclobenzaprine (FLEXERIL) tablet 10 mg  10 mg Oral TID PRN Haroldo Muller MD        oxyCODONE-acetaminophen (PERCOCET) 5-325 MG per tablet 1 tablet  1 tablet Oral Q4H PRN Haroldo Muller MD        Or    oxyCODONE-acetaminophen (PERCOCET) 5-325 MG per tablet 2 tablet  2 tablet Oral Q4H PRN Haroldo Muller MD   2 tablet at 04/15/19

## 2019-04-16 NOTE — PROGRESS NOTES
This nurse discussed surgical consent with Pattie, ortho NP, and Karyna Barnhart with [facility] risk management. Patient is confused r/t ETOH withdrawal.  Patient is unable to sign consent for right hip surgery. This nurse attempted to contact Melina Delgado (brother), and Garima Torrezjovan (other), unsuccessfully. Both Pattie, ortho NP, and Ranjeet Enriquez aware and notified of the sittuation. Will continue to monitor and reassess.  Electronically signed by Allen Coleman RN on 4/16/2019

## 2019-04-16 NOTE — PROGRESS NOTES
Patient alert to self and place  but confused about seeing things  and agitated. Ciwa-ar score was assessed and patient scored an 8. Prn mediations were administered. Patient has call light within reach. Seizure precautions in place. Patient educated to call out if in need. Patient verbalized understanding . Patient ate 75 percent of his dinner tray. Telemonitor in place, nsr, tachycardia, burnette in place patient tolerating well. Will continue to monitor and assess.  Electronically signed by Coni Duran RN on 4/16/2019 at 12:31 AM

## 2019-04-16 NOTE — BRIEF OP NOTE
Brief Postoperative Note  ______________________________________________________________    Patient: Kristine Gifford  YOB: 1955  MRN: [id number]  Date of Procedure: 4/16/2019    Pre-Op Diagnosis: RIGHT SUBTROCHANTERIC FEMUR FX    Post-Op Diagnosis: Same       Procedure(s):  RIGHT FEMUR OPEN REDUCTION AND INTRAMEDULLARY NAILING     Anesthesia: General    Surgeon(s):  Sarah Marin MD    Assistant: Manny Lemus    Estimated Blood Loss (mL): 36ZS    Complications: None    Specimens:   * No specimens in log *    Implants:  Implant Name Type Inv. Item Serial No.  Lot No. LRB No. Used   NAIL FEMORAL GT RIGHT [id number] Screw/Plate/Nail/Reji NAIL FEMORAL GT RIGHT [id number]  MISTI: ORTHOPAEDICS [id number] Right 1   SCREW LAG RECON ST T2 6.5X95MM Screw/Plate/Nail/Reji SCREW LAG RECON ST T2 6.5X95MM  3M: MEDICAL PRDTS [id number] Right 2   SCREW LK 4X42. 5MM Screw/Plate/Nail/Reji SCREW LK [id number]. 5MM  STRYKERGleda Gross [id number] Right 1   SCREW LK 5X45MM Screw/Plate/Nail/Reji SCREW LK 5X45MM  STRYKERLaine Fee Right 1         Drains:   Urethral Catheter Non-latex 16 fr (Active)   $ Urethral catheter insertion $ Not inserted for procedure 4/15/2019 10:30 AM   Catheter Indications Perioperative use in selected surgeries including but not limited to urologic, pelvic or need for intraoperative monitoring of urinary output due to prolonged surgery, large volume infusion or need for diuretic therapy in surgery 4/15/2019 10:30 AM   Securement Device Date Changed 04/15/19 4/15/2019 10:30 AM   Site Assessment Pink; No urethral drainage 4/16/2019  8:04 AM   Urine Color Vibha;Tea 4/16/2019  8:04 AM   Urine Appearance Clear 4/16/2019  8:04 AM   Output (mL) 300 mL 4/16/2019  6:32 AM       [REMOVED] External Urinary Catheter (Removed)           Sarah Marin MD  Date: 4/16/2019  Time: 4:19 PM

## 2019-04-16 NOTE — PROGRESS NOTES
Patient's brother Dani Aiken returned this nurse's call. This nurse informed him that patient just left for surgery, and that Pattie, ortho NP, will call him to update him on patient. howie Blankenship, NP, aware and notified. Stated that she will call patient's brother. Will continue to monitor and reassess.  Electronically signed by Payton Frias RN on 4/16/2019

## 2019-04-16 NOTE — PROGRESS NOTES
[facility]   Progress Note      S/P :  SUBJECTIVE  In bed. Confused to place and situation but then does say his hip is broken. Congested cough noted. . Pain is   described in right hip more than right shoulder and with the intensity of mild at rest. Pain is described as aching. OBJECTIVE              Physical                      VITALS:  /65   Pulse 97   Temp 98.4 °F (36.9 °C) (Oral)   Resp 16   Ht 5' 9\" (1.753 m)   Wt 168 lb 3.4 oz (76.3 kg)   SpO2 99%   BMI 24.84 kg/m²                     MUSCULOSKELETAL:  Right foot and hand NVI, warm and pink, wiggles fingers and toes. Right leg shortened.                     NEUROLOGIC:                                  Sensory:  Touch:  Right Upper Extremity:  normal  Right Lower Extremity:  normal                                      Data       CBC:   Lab Results   Component Value Date    WBC 8.6 04/15/2019    RBC 3.00 04/15/2019    HGB 10.7 04/15/2019    HCT 31.0 04/15/2019    .3 04/15/2019    MCH 35.8 04/15/2019    MCHC 34.6 04/15/2019    RDW 13.7 04/15/2019     04/15/2019    MPV 9.7 04/15/2019        WBC:    Lab Results   Component Value Date    WBC 8.6 04/15/2019        Hemoglobin/Hematocrit:    Lab Results   Component Value Date    HGB 10.7 04/15/2019    HCT 31.0 04/15/2019        PT/INR:    Lab Results   Component Value Date    PROTIME 11.6 04/15/2019    INR 1.02 04/15/2019              Current Inpatient Medications             Current Facility-Administered Medications: ampicillin-sulbactam (UNASYN) 1.5 g IVPB minibag, 1.5 g, Intravenous, Q6H  sodium chloride flush 0.9 % injection 10 mL, 10 mL, Intravenous, 2 times per day  sodium chloride flush 0.9 % injection 10 mL, 10 mL, Intravenous, PRN  sodium chloride 0.9 % 9,079 mL with folic acid 1 mg, adult multi-vitamin with vitamin k 10 mL, thiamine 100 mg, , Intravenous, Daily  LORazepam (ATIVAN) tablet 1 mg, 1 mg, Oral, Q1H PRN **OR** LORazepam (ATIVAN) injection 1 mg, 1 mg, Intravenous, Q1H PRN **OR** LORazepam (ATIVAN) tablet 2 mg, 2 mg, Oral, Q1H PRN **OR** LORazepam (ATIVAN) injection 2 mg, 2 mg, Intravenous, Q1H PRN **OR** LORazepam (ATIVAN) tablet 3 mg, 3 mg, Oral, Q1H PRN **OR** LORazepam (ATIVAN) injection 3 mg, 3 mg, Intravenous, Q1H PRN **OR** LORazepam (ATIVAN) tablet 4 mg, 4 mg, Oral, Q1H PRN **OR** LORazepam (ATIVAN) injection 4 mg, 4 mg, Intravenous, Q1H PRN  ipratropium-albuterol (DUONEB) nebulizer solution 1 ampule, 1 ampule, Inhalation, Q4H WA  azithromycin (ZITHROMAX) tablet 250 mg, 250 mg, Oral, Daily  famotidine (PEPCID) tablet 20 mg, 20 mg, Oral, BID  phenytoin (DILANTIN) ER capsule 200 mg, 200 mg, Oral, BID  tamsulosin (FLOMAX) capsule 0.4 mg, 0.4 mg, Oral, Daily  sodium chloride flush 0.9 % injection 10 mL, 10 mL, Intravenous, 2 times per day  sodium chloride flush 0.9 % injection 10 mL, 10 mL, Intravenous, PRN  magnesium hydroxide (MILK OF MAGNESIA) 400 MG/5ML suspension 30 mL, 30 mL, Oral, Daily PRN  ondansetron (ZOFRAN) injection 4 mg, 4 mg, Intravenous, Q6H PRN  acetaminophen (TYLENOL) tablet 650 mg, 650 mg, Oral, Q4H PRN  cyclobenzaprine (FLEXERIL) tablet 10 mg, 10 mg, Oral, TID PRN  oxyCODONE-acetaminophen (PERCOCET) 5-325 MG per tablet 1 tablet, 1 tablet, Oral, Q4H PRN **OR** oxyCODONE-acetaminophen (PERCOCET) 5-325 MG per tablet 2 tablet, 2 tablet, Oral, Q4H PRN  morphine (PF) injection 2 mg, 2 mg, Intravenous, Q2H PRN **OR** morphine (PF) injection 4 mg, 4 mg, Intravenous, Q2H PRN  gabapentin (NEURONTIN) capsule 300 mg, 300 mg, Oral, TID    ASSESSMENT AND PLAN    Fall  Right prox humerus fx, nondisplaced Will need sling for immobility, NWB  Right ST hip fx. Plan ORIF today per Dr Misti Enriquez  NPO for OR  Hallucination and confusion intermittent. Drinks 6+ beers a day, on CIWA  Has been seen by pulm (clear for OR) and card.  Echo pending this AM.             Wiliam Mccarty [facility]  4/16/2019  9:17 AM

## 2019-04-16 NOTE — PLAN OF CARE
injury  Outcome: Ongoing  Note:   Fall risk assessment completed. Fall precautions in place. Call light within reach. Pt educated on calling for assistance before getting up. Walkway free of clutter. Will continue to monitor.   Electronically signed by Colten Landaverde RN on 4/16/2019

## 2019-04-16 NOTE — OP NOTE
DATE OF SURGERY:  4/16/19    PREOPERATIVE DIAGNOSIS: Right subtrochanteric femur fracture. POSTOPERATIVE DIAGNOSIS: Right subtrochanteric femur fracture. PROCEDURE: Right femur open reduction, intramedullary nailing. ASSISTANT: Tammy Elise    ANESTHESIA: General.    ESTIMATED BLOOD LOSS: 75 mL. COMPLICATIONS: None. DISPOSITION: To PACU in good condition. INDICATIONS FOR PROCEDURE: The patient is a 70 y.o. male  who sustained a fall onto the right hip. The patient was brought to the [facility] and was discovered to have a displaced right subtrochanteric femur / hip fracture. The patient was subsequently admitted to the hospital for definitive treatment. An orthopedic consultation was obtained. I recommended operative fixation of the hip. The patient has a history of alcoholism and was disoriented throughout his hospital admission, and was beginning to have alcohol withdrawals. We were unable to obtain informed consent from the patient. We attempted to contact the patient's family listed in his chart, but were unsuccessful. The patient was discussed with risk management. Patient had pulmonary and cardiology consults. All physicians were in agreement that the patient should have immediate fixation of his hip fracture in order to have an optimal outcome and minimize risks of complications from his injury. DESCRIPTION OF PROCEDURE: The patient was seen in the preoperative holding area. The right hip was marked. The patient was seen by the Anesthesia service. The patient was then brought to the operating room. The patient was induced under general anesthesia on the bed. The patient was given  prophylactic preoperative IV antibiotics. The patient was then transferred onto the fracture table in the supine position. Care was taken to ensure that all   bony prominences were well padded. The nonoperative leg was placed in the well leg shine.  The operative leg was placed in a traction boot. We then attempted to perform a closed reduction on the fracture table with fluoroscopy. We were unable to get an anatomic reduction. Once we had better reduction and good characterization of the fracture, we then sterilely prepped and draped the operative hip in the usual  fashion. A time-out was taken where the patient, the operative extremity and the operative procedure were once again verified. We then made a 3-4cm lateral incision centered over the fracture site. We dissected down through the subcutaneous tissue and through the IT band down to the fracture site. We then attempted to get an anatomic reduction. We held the reduction in place with fracture reduction clamps. Fluoroscopy was used to confirm reduction. We then made an approximately 3 cm incision just superior to the tip of the greater trochanter along the lateral aspect of the hip. Sharp dissection was carried  down through the skin. Blunt dissection was carried down to the tip of the greater trochanter. We then inserted the curved awl. The awl was placed at  the tip of the greater trochanter and position was verified via fluoroscopy. The awl was then inserted into the intramedullary canal. The ball-tip  guidewire was then passed down into the intramedullary canal to the level of the superior pole of the patella. We then removed the awl. We then measured  the length of our wire using the measuring device. This measured for a 400 mm nail. We then inserted the opening reamer. We then used the flexible reamers to ream up until we had good chatter and we could place the largest nail possible. We reamed up to 13.5mm. We then inserted a Kalina 12 x 400mm T2 antegrade recon femoral nail. The nail was inserted until adequate positioning was verified via fluoroscopy. We then inserted the drill sleeves along the lateral aspect of the thigh. An approximately 1.5 cm incision was made.  Blunt dissection was carried down to the lateral aspect of the femur. The drill sleeve was then placed along the lateral aspect of the femur. The guide pin was then placed through the femoral neck into the head after confirming position on fluoroscopy. We then drilled through the second drill sleeve. We measured using our drill. We measured for a 95mm screw superiorly. We then inserted the screw until it was fully seated. We then drilled for the inferior screw,which measured 95mm. We then inserted the inferior screw until it was fully seated. We then attached the distal aiming guide onto the insertion handle. Fluoroscopy was used to verify position of the drill sleeve. A small incision was made along the lateral distal femur. We then drilled through the femur and measured the length for our distal locking screw. A 42.5mm screw was inserted. A second distal locking screw measuring 45mm was also inserted in similar fashion. We then took final fluoroscopy pictures. The wounds were then copiously irrigated with normal saline solution. The subcutaneous tissue was closed with 2-0 Vicryl suture in a simple inverted interrupted fashion. The skin was then closed with staples. Xeroform gauze was then applied. Marcaine 0.5% was injected for local anesthesia. A 4 x 4 gauze, ABD pads and tape were then applied. The patient was then awakened from general anesthesia and transferred onto her hospital bed and transported to the PACU for recovery. The patient  tolerated the procedure well and without any complications. PLANS: The patient will be recovered in the PACU and then be readmitted to the floor. The patient is Partial weight bearing (30-50%) on the operative leg. The patient will have PT and OT consult. The patient will have 24 hours of prophylactic IV antibiotics. The patient will have DVT prophylaxis.         Electronically signed by Edison Torrez MD on 4/16/2019 at 4:34 PM

## 2019-04-16 NOTE — PROGRESS NOTES
[address]   Daily Progress Note      Admit Date:  4/14/2019    CC: \"Confusion\"    This is a 68-year-old male who presented to  the hospital after he apparently fell, complaining of right hip pain. He has been found to have right femoral and right humeral fracture along  with broken ribs. The patient was supposed to have surgery for his  femoral fracture but it was canceled due to his confusional status and  inability to give informed consent. The patient supposedly had passed  out though I am unable to obtain any history out of the patient as he is  currently heavily sedated and apparently also is quite confused.         Subjective:  Pt with no acute overnight events. Denies chest pain, palpitations, and dyspnea. Patient continues to remain confused and appears to be going through alcohol withdrawal.    Objective:   /65   Pulse 97   Temp 98.4 °F (36.9 °C) (Oral)   Resp 16   Ht 5' 9\" (1.753 m)   Wt 168 lb 3.4 oz (76.3 kg)   SpO2 99%   BMI 24.84 kg/m²       Intake/Output Summary (Last 24 hours) at 4/16/2019 1126  Last data filed at 4/16/2019 3220  Gross per 24 hour   Intake --   Output 700 ml   Net -700 ml     Wt Readings from Last 3 Encounters:   04/15/19 168 lb 3.4 oz (76.3 kg)   03/19/16 180 lb (81.6 kg)     Telemetry:    Physical Exam:  General:  NAD, Awake but confused  Skin:  Warm and dry  Neck:  Supple, no JVP appreciated, no bruit  Chest:  Clear to auscultation, no wheezes/rhonchi/rales  Cardiovascular:  Regular rate.  S1S2  Abdomen:  Soft, nontender, +bowel sounds  Extremities:  No LE edema    Cardiac Diagnosis:  none    Medications:    ampicillin-sulbactam  1.5 g Intravenous Q6H    sodium chloride flush  10 mL Intravenous 2 times per day    folic acid, thiamine, multi-vitamin with vitamin K infusion   Intravenous Daily    ipratropium-albuterol  1 ampule Inhalation Q4H WA    azithromycin  250 mg Oral Daily    famotidine  20 mg Oral BID    phenytoin  200 mg Oral BID    tamsulosin  0.4 mg Oral Daily    sodium chloride flush  10 mL Intravenous 2 times per day    gabapentin  300 mg Oral TID       sodium chloride flush, LORazepam **OR** LORazepam **OR** LORazepam **OR** LORazepam **OR** LORazepam **OR** LORazepam **OR** LORazepam **OR** LORazepam, sodium chloride flush, magnesium hydroxide, ondansetron, acetaminophen, cyclobenzaprine, oxyCODONE-acetaminophen **OR** oxyCODONE-acetaminophen, morphine **OR** morphine    Lab Data:  CBC:   Recent Labs     04/14/19  0414 04/15/19  0520   WBC 14.4* 8.6   HGB 12.5* 10.7*    168     BMP:    Recent Labs     04/14/19  0414 04/15/19  0520   * 136   K 3.5 4.1   CO2 24 29   BUN 7 13   CREATININE <0.5* 0.7*     LIVR:   Recent Labs     04/14/19  0414 04/15/19  0520   AST 40* 42*   ALT 30 26     INR:    Recent Labs     04/15/19  0520   INR 1.02     APTT:   Recent Labs     04/15/19  0520   APTT 30.6     BNP:  No results for input(s): BNP in the last 72 hours. Imaging:Suboptimal image quality.   Ejection fraction is visually estimated to be 55-60%.   no significant valvular abnormalities are noted       Assessment/Plan:   ? Syncope  - Due to etoh abuse  - ECHO shows normal LVEF    2) ETOH abuse    3) Femoral fracture  Needs to undergo surgery.  -Patient's echocardiogram showed normal left finger systolic function and he appears clinically well from cardiac standpoint.  I have therefore clear him for this upcoming surgery        )              Electronically signed by Giacomo Woods MD on 4/16/2019 at 11:26 AM

## 2019-04-16 NOTE — PROGRESS NOTES
Pt awake. Oriented to person and time. Confused to situation. Attempted to reorient pt. Pt denies pain. Follows commands-sleeping when not disturbed.

## 2019-04-16 NOTE — PROGRESS NOTES
Discussed surgical consent with Caterina Cash, risk management [facility]  Pt is confused, in ETOH withdrawal. Unable to sign consent for right hip surgery today  Unable to contact Shobha Bowden (brother) or Kory Thornton (other) by phone, [facility]. These names were listed on Facesheet 2017. Pt with pulm congestion. Seen by pulmonology and cardiology for clearance. Agrees need to proceed to OR sooner than later before in full ETOH withdrawal. As well, increase pulm problems with bedrest. Pt requires fixation left hip for mobility, prevent further medical decline.

## 2019-04-16 NOTE — PROGRESS NOTES
No sclera icterus. No conjunctival injection. ENT: No discharge. Pharynx clear. External appearance of ears and nose normal.  Neck: Trachea midline. No obvious mass. Resp: No accessory muscle use. No crackles. No wheezes. Few rhonchi. No dullness on percussion. CV: Regular rate. Regular rhythm. No murmur or rub. No edema. GI: Non-tender. Non-distended. No hernia. Skin: Warm, dry, normal texture and turgor. Rash + L arm  Lymph: No cervical LAD. No supraclavicular LAD. M/S: No cyanosis. No clubbing. R arm in sling, . Neuro: Moves all four extremities. CN 2-12 tested, no defect noted. Psych: Oriented x 1. Confused hallucinations +    Data    LABS  CBC:   Recent Labs     04/14/19  0414 04/15/19  0520   WBC 14.4* 8.6   HGB 12.5* 10.7*   HCT 37.5* 31.0*   .8* 103.3*    168     BMP:   Recent Labs     04/14/19  0414 04/15/19  0520   * 136   K 3.5 4.1   CL 93* 99   CO2 24 29   BUN 7 13   CREATININE <0.5* 0.7*     POC GLUCOSE:  No results for input(s): POCGLU in the last 72 hours. LIVER PROFILE:   Recent Labs     04/14/19  0414 04/15/19  0520   AST 40* 42*   ALT 30 26   LABALBU 3.0* 2.3*   BILITOT 0.4 0.5   ALKPHOS 144* 122     PT/INR:   Recent Labs     04/15/19  0520   PROTIME 11.6   INR 1.02     APTT:   Recent Labs     04/15/19  0520   APTT 30.6     UA:No results for input(s): NITRITE, COLORU, PHUR, LABCAST, WBCUA, RBCUA, MUCUS, TRICHOMONAS, YEAST, BACTERIA, CLARITYU, SPECGRAV, LEUKOCYTESUR, UROBILINOGEN, BILIRUBINUR, BLOODU, GLUCOSEU, KETUA, AMORPHOUS in the last 72 hours. Microbiology:  Wound Culture: No results for input(s): WNDABS, ORG in the last 72 hours. Invalid input(s):  LABGRAM  Nasal Culture: No results for input(s): ORG, MRSAPCR in the last 72 hours. Blood Culture: No results for input(s): BC, BLOODCULT2 in the last 72 hours. Fungal Culture:   No results for input(s): FUNGSM in the last 72 hours. No results for input(s): FUNCXBLD in the last 72 hours. CSF Culture:   No results for input(s): COLORCSF, APPEARCSF, CFTUBE, CLOTCSF, WBCCSF, RBCCSF, NEUTCSF, NUMCELLSCSF, LYMPHSCSF, MONOCSF, GLUCCSF, VOLCSF in the last 72 hours. Respiratory Culture:  No results for input(s): Fidelia Carrel in the last 72 hours. AFB:No results for input(s): AFBSMEAR in the last 72 hours. Urine Culture  No results for input(s): LABURIN in the last 72 hours. RADIOLOGY:    XR CHEST PORTABLE   Final Result   Unchanged left-sided rib fractures. No underlying pneumothorax or pleural   effusion. Suspect lower right-sided rib fractures, age indeterminate. XR HIP RIGHT (2-3 VIEWS)   Final Result   Proximal right femoral shaft fracture with suspected involvement of the   lesser trochanter. XR FEMUR RIGHT (MIN 2 VIEWS)   Final Result   Proximal right femur fracture with involvement of the lesser trochanter. XR SHOULDER RIGHT (MIN 2 VIEWS)   Final Result   Nondisplaced proximal right humeral fracture. CT HEAD WO CONTRAST   Final Result   No acute intracranial abnormality. Areas of encephalomalacia in the inferior right frontal lobe and anterior   right temporal lobe are compatible with sequela of prior trauma. Moderate microvascular ischemic disease. XR CHEST 1 VW   Final Result   Minimally displaced left 7th and 8th rib fractures. No underlying   pneumothorax or pleural effusion. Diffuse small airway inflammation may be seen with asthma, bronchitis or   smoking. No consolidative airspace disease.          XR HIP RIGHT (2-3 VIEWS)    (Results Pending)       CONSULTS:    [address]  IP CONSULT TO HOSPITALIST  IP CONSULT TO SOCIAL WORK  IP CONSULT TO CARDIOLOGY  IP CONSULT TO PULMONOLOGY    ASSESSMENT AND PLAN:      Principal Problem:    Right femoral fracture ([address]  Active Problems:    Fall    Right humeral fracture    Alcohol intoxication ([address]    Hypomagnesemia    Epilepsia ([address]    Closed fracture of neck of right femur [address]    Alcohol dependence ([facility] [address] [facility]    Multiple rib fractures    Syncope    Shortness of breath    Bronchitis    Tobacco abuse    ETOH abuse  Resolved Problems:    * No resolved hospital problems. *    Confused, delirious- treat as DT  -  sec to alcohol withdrawal  - start CIWA    Right femoral fracture shaft and subtrochanteric  - ortho consulted  - for surgery    Humeral fracture non displaced    pain management, per orthopedic. R rib fracture  - pain control  -incentive spirometry     Alcohol dependence with abuse  - CIWA   - banana bag      History of epilepsy.  -ct home meds     Hypomagnesemia, replaced. Cough with yellow sputum- treat as bronchitis with h/o smoking  - duo neb  -ssi  -add unasyn to cover aspiration         DVT Prophylaxis: lovenox  Diet: Diet NPO, After Midnight  Code Status: Full Code    PT/OT Eval Status:post surgery    Discharge plan - ct care     The patient and / or the family were informed of the results of any tests, a time was given to answer questions, a plan was proposed and they agreed with plan. Discussed with consulting physicians, nursing and social work     The note was completed using EMR. Every effort was made to ensure accuracy; however, inadvertent computerized transcription errors may be present.        Ozzy Henderson MD

## 2019-04-16 NOTE — H&P
Preoperative H&P Update    The patient's History and Physical in the medical record from 4/14/19 was reviewed by me today. I reviewed the HPI, medications, allergies, reason for surgery, diagnosis and treatment plan and there has been no change. The patient was evaluated by me today. Physical exam findings for this update include:    /75   Pulse 99   Temp 97.4 °F (36.3 °C) (Oral)   Resp 16   Ht 5' 9\" (1.753 m)   Wt 168 lb 3.4 oz (76.3 kg)   SpO2 98%   BMI 24.84 kg/m²   Patient still confused, and in EtOH withdrawal.    Airway is intact  Chest: breathing comfortably  Heart: regular rate  Findings on exam of the body region where surgery is to be performed include: see last office and/or consult note    Patient has been seen by pulmonology and cardiology. Appreciate reccs. Still unable to get a hold of patient's family members. Patient unable to give informed consent secondary to mental status changes. The literature demonstrates that patient's with hip fractures have fewer complications the sooner the fracture is fixed. Dr. Alon Trevino also recommends proceeding to OR sooner than later before in full ETOH withdrawal. As well, increase pulm problems with bedrest. Pt requires fixation left hip for mobility, prevent further medical decline    Patient has been discussed with risk management. All physicians are in agreement with proceeding with surgery.         Electronically signed by Nneka Huddleston MD on 4/16/2019 at 2:39 PM

## 2019-04-16 NOTE — CARE COORDINATION
Sw attempted to see pt--pt is confused     Sharda contacted pt's family (brother Teresita Dangelo and Heron Calhoun) [place]    Sharda will follow-- to assess needs. Electronically signed by Gil Parks on 2019 at 11:37 AM  Electronically signed by Juanito Gaming on 2019 at 12:21 PM      2:28 PM  Sw received patient's brother's phone number from [facility] He is currently out of town and prefers referral to [facility]. Teresita Dangelo [phone]     Sharda spoke with Teresitairon Dangelo who confirmed above and also asked for referral to [facility]. Sw made referrals to above facilities. Electronically signed by Juanito Gaming on 2019 at 2:32 PM      3:37 PM  Sharda received call back from [facility] will review patient after therapy. [facility] will let Sharda know outcome of review.    Electronically signed by Juanito Gaming on 2019 at 3:38 PM

## 2019-04-16 NOTE — CONSULTS
PATIENT IS SEEN AT THE REQUEST OF DR. Aby Real for pulmonary clearance    CONSULTING PHYSICIAN: Sheba    HISTORY OF PRESENT ILLNESS:  This is a 70 y.o. male who presented to the ED on 4/14 with a CC of fall due to ETOH intoxication. Per ED notes he lost balance and fell. No LOC. He felt dizzy prior to fall. Diagnosed with femur and humerus fracture. OR cancelled due to congestion in chest    I entered the room to introduce myself and all he cared about was that he had \"a hose in his tre. \"  He was really hard to follow and was not the greatest historian. Admits to being drunk and falling. He also admitted to smoking for years. He is having some SOb with yellow sputum. Denies lung issues at baseline. Established Pulmonologist:  None    PAST MEDICAL HISTORY:  Past Medical History:   Diagnosis Date    BPH (benign prostatic hyperplasia)     Seizures ([facility]        PAST SURGICAL HISTORY:  Herman Wolf he had head surgery in the past    FAMILY HISTORY:  Parents were smokers but he did not known there diseases     SOCIAL HISTORY:   reports that he has been smoking cigarettes. He has been smoking about 1.50 packs per day.  He has never used smokeless tobacco.   Daily drinker    Scheduled Meds:   sodium chloride flush  10 mL Intravenous 2 times per day    folic acid, thiamine, multi-vitamin with vitamin K infusion   Intravenous Daily    ipratropium-albuterol  1 ampule Inhalation Q4H WA    azithromycin  250 mg Oral Daily    predniSONE  40 mg Oral Daily    famotidine  20 mg Oral BID    phenytoin  200 mg Oral BID    tamsulosin  0.4 mg Oral Daily    sodium chloride flush  10 mL Intravenous 2 times per day    gabapentin  300 mg Oral TID       Continuous Infusions:      PRN Meds:  sodium chloride flush, LORazepam **OR** LORazepam **OR** LORazepam **OR** LORazepam **OR** LORazepam **OR** LORazepam **OR** LORazepam **OR** LORazepam, sodium chloride flush, magnesium hydroxide, ondansetron, acetaminophen, cyclobenzaprine, input(s): NITRITE, COLORU, PHUR, LABCAST, WBCUA, RBCUA, MUCUS, TRICHOMONAS, YEAST, BACTERIA, CLARITYU, SPECGRAV, LEUKOCYTESUR, UROBILINOGEN, BILIRUBINUR, BLOODU, GLUCOSEU, AMORPHOUS in the last 72 hours. Invalid input(s): KETONESU  No results for input(s): PHART, TCX1NCQ, PO2ART in the last 72 hours. Cultures:  None    PFTs:  None       ECHO:  Pending     ABG:  None    Chest X-ray:  Chest imaging was reviewed by me and showed old rib fractures, calcified granuloma    Chest CT: 9/2018  1.  Interval increase size of a small right pleural effusion. 2.  Interval development of mixed consolidative and groundglass right lower lobe airspace disease, likely related to atelectasis and probable superimposed pneumonia, possibly aspiration related. 3.  Patchy groundglass opacities within the anterior upper lobes are nonspecific and may be infectious/inflammatory in etiology or less likely areas of pulmonary edema. I reviewed all the above labs and studies pertaining to this visit. ASSESSMENT:  · Dyspnea  · Possible bronchitis  · Tobacco abuse  · ETOH abuse    PLAN:  Titrate oxygen for saturations greater than or equal to 92%  · Add unasyn to his azithromycin  · Duonebs q 4 hours  · Dc Prednisone due to delayed wound healing  · Sputum cultures  · Procalcitonin level  · DVT prophylaxis with lovenox    Hard to risk stratify due to his inability to provide concise medial history, however see below. Surgery lasting longer than 2 hours will increase his risk for post-operative pulmonary complications (from low to intermediate) but he is cleared for surgery. .  I don't know if his ETOH abuse increases his risk but I would recommend surgery sooner rather than later before he goes into DTs. Canet risk index: Independent predictors of postoperative complications   Factor Adjusted odds ratio (95% CI) Risk score   Age, years   ? 50 1    51-80 1.4 (0.6-3.3) 3   >80 5.1 (1.9-13.3) 16   Preoperative O2 saturation   ? 80 percent 1    91-95 percent 2.2 (1.2-4.2) 8   ?90 percent 10.7 (4.1-28.1) 24   Respiratory infection in the last month 5.5 (2.6-11.5) 17   Preoperative anemia - hemoglobin ?10 g/dL 3.0 (1.4-6.5) 11   Surgical incision   Upper abdominal 4.4 (2.3-8.5) 15   Intrathoracic 11.4 (1.9-26.0) 24   Duration of surgery   ?2 hours 1    2-3 hours 4.9 (2.4-10.1) 16   >3 hours 9.7 (2.4-19.9) 23   Emergency surgery 2.2 (1.0-4.5) 8   Risk class Number of points in risk score Pulmonary complication rate  (validation sample)   Low <26 points 1.6 percent   Intermediate 26-44 points 13.3 percent   High risk ? 45 points 42.1 percent   Adapted with permission from: Carvel Nyhan, Gomar C, et al. Prediction of postoperative pulmonary complications in a population-based surgical cohort. Anesthesiology 2010; [id number]. There are no widely accepted scoring systems that predict post-operative pulmonary complications. There are certain things that can be done in order to prevent post-operative pulmonary complications.   I would recommend the following in the post-operative state:   A)  Bronchodilators    B)  DVT Prophylaxis   C)  Early ambulation (if possible)   D)  Incentive Spirometry    Thanks    Anneliese Bermudez DO  [facility]

## 2019-04-16 NOTE — PROGRESS NOTES
Telecam in place. Siezure precautions in place. CIWA Score of 8 at 0501. Call light within reach. Patient next to the nurses station. Fall risk assessment completed. Fall precautions in place. Call light within reach. Pt educated on calling for assistance before getting up. Walkway free of clutter. Will continue to monitor.

## 2019-04-16 NOTE — PROGRESS NOTES
Patient resting in bed at this time. Patient is currently NPO. Call light within reach. Tele cam in place. Will continue to monitor and reassess.  Electronically signed by Giles You RN on 4/16/2019

## 2019-04-17 LAB
A/G RATIO: 0.7 (ref 1.1–2.2)
ALBUMIN SERPL-MCNC: 2.1 G/DL (ref 3.4–5)
ALP BLD-CCNC: 97 U/L (ref 40–129)
ALT SERPL-CCNC: 24 U/L (ref 10–40)
ANION GAP SERPL CALCULATED.3IONS-SCNC: 8 MMOL/L (ref 3–16)
ANISOCYTOSIS: ABNORMAL
AST SERPL-CCNC: 33 U/L (ref 15–37)
BASOPHILS ABSOLUTE: 0 K/UL (ref 0–0.2)
BASOPHILS RELATIVE PERCENT: 0 %
BILIRUB SERPL-MCNC: 0.7 MG/DL (ref 0–1)
BUN BLDV-MCNC: 8 MG/DL (ref 7–20)
CALCIUM SERPL-MCNC: 7.7 MG/DL (ref 8.3–10.6)
CHLORIDE BLD-SCNC: 101 MMOL/L (ref 99–110)
CO2: 29 MMOL/L (ref 21–32)
CREAT SERPL-MCNC: <0.5 MG/DL (ref 0.8–1.3)
EOSINOPHILS ABSOLUTE: 0.1 K/UL (ref 0–0.6)
EOSINOPHILS RELATIVE PERCENT: 1 %
GFR AFRICAN AMERICAN: >60
GFR NON-AFRICAN AMERICAN: >60
GLOBULIN: 3.2 G/DL
GLUCOSE BLD-MCNC: 112 MG/DL (ref 70–99)
HCT VFR BLD CALC: 24.6 % (ref 40.5–52.5)
HEMOGLOBIN: 8.5 G/DL (ref 13.5–17.5)
LYMPHOCYTES ABSOLUTE: 0.6 K/UL (ref 1–5.1)
LYMPHOCYTES RELATIVE PERCENT: 4 %
MCH RBC QN AUTO: 35 PG (ref 26–34)
MCHC RBC AUTO-ENTMCNC: 34.4 G/DL (ref 31–36)
MCV RBC AUTO: 101.6 FL (ref 80–100)
MONOCYTES ABSOLUTE: 1.3 K/UL (ref 0–1.3)
MONOCYTES RELATIVE PERCENT: 9 %
NEUTROPHILS ABSOLUTE: 12.4 K/UL (ref 1.7–7.7)
NEUTROPHILS RELATIVE PERCENT: 86 %
PDW BLD-RTO: 13.5 % (ref 12.4–15.4)
PLATELET # BLD: 166 K/UL (ref 135–450)
PMV BLD AUTO: 10.4 FL (ref 5–10.5)
POTASSIUM SERPL-SCNC: 3.7 MMOL/L (ref 3.5–5.1)
RBC # BLD: 2.42 M/UL (ref 4.2–5.9)
SODIUM BLD-SCNC: 138 MMOL/L (ref 136–145)
TOTAL PROTEIN: 5.3 G/DL (ref 6.4–8.2)
WBC # BLD: 14.4 K/UL (ref 4–11)

## 2019-04-17 PROCEDURE — 6370000000 HC RX 637 (ALT 250 FOR IP): Performed by: ORTHOPAEDIC SURGERY

## 2019-04-17 PROCEDURE — 97530 THERAPEUTIC ACTIVITIES: CPT

## 2019-04-17 PROCEDURE — 6360000002 HC RX W HCPCS: Performed by: ORTHOPAEDIC SURGERY

## 2019-04-17 PROCEDURE — 2580000003 HC RX 258: Performed by: ORTHOPAEDIC SURGERY

## 2019-04-17 PROCEDURE — 99232 SBSQ HOSP IP/OBS MODERATE 35: CPT | Performed by: INTERNAL MEDICINE

## 2019-04-17 PROCEDURE — 36415 COLL VENOUS BLD VENIPUNCTURE: CPT

## 2019-04-17 PROCEDURE — 6370000000 HC RX 637 (ALT 250 FOR IP): Performed by: INTERNAL MEDICINE

## 2019-04-17 PROCEDURE — 94760 N-INVAS EAR/PLS OXIMETRY 1: CPT

## 2019-04-17 PROCEDURE — 80053 COMPREHEN METABOLIC PANEL: CPT

## 2019-04-17 PROCEDURE — 94640 AIRWAY INHALATION TREATMENT: CPT

## 2019-04-17 PROCEDURE — 85025 COMPLETE CBC W/AUTO DIFF WBC: CPT

## 2019-04-17 PROCEDURE — 1200000000 HC SEMI PRIVATE

## 2019-04-17 PROCEDURE — 2700000000 HC OXYGEN THERAPY PER DAY

## 2019-04-17 PROCEDURE — 2500000003 HC RX 250 WO HCPCS: Performed by: ORTHOPAEDIC SURGERY

## 2019-04-17 RX ORDER — FOLIC ACID 1 MG/1
1 TABLET ORAL DAILY
Status: DISCONTINUED | OUTPATIENT
Start: 2019-04-18 | End: 2019-04-22 | Stop reason: HOSPADM

## 2019-04-17 RX ORDER — OXYCODONE HYDROCHLORIDE AND ACETAMINOPHEN 5; 325 MG/1; MG/1
1-2 TABLET ORAL EVERY 4 HOURS PRN
Qty: 60 TABLET | Refills: 0 | Status: ON HOLD | OUTPATIENT
Start: 2019-04-17 | End: 2019-05-07

## 2019-04-17 RX ORDER — CHLORDIAZEPOXIDE HYDROCHLORIDE 25 MG/1
25 CAPSULE, GELATIN COATED ORAL 3 TIMES DAILY
Status: COMPLETED | OUTPATIENT
Start: 2019-04-17 | End: 2019-04-17

## 2019-04-17 RX ORDER — ASPIRIN 325 MG
325 TABLET, DELAYED RELEASE (ENTERIC COATED) ORAL DAILY
Qty: 14 TABLET | Refills: 3 | Status: SHIPPED | OUTPATIENT
Start: 2019-05-02 | End: 2019-04-20 | Stop reason: SDUPTHER

## 2019-04-17 RX ORDER — THIAMINE MONONITRATE (VIT B1) 100 MG
100 TABLET ORAL DAILY
Status: DISCONTINUED | OUTPATIENT
Start: 2019-04-18 | End: 2019-04-22 | Stop reason: HOSPADM

## 2019-04-17 RX ADMIN — LORAZEPAM 1 MG: 1 TABLET ORAL at 04:52

## 2019-04-17 RX ADMIN — FOLIC ACID: 5 INJECTION, SOLUTION INTRAMUSCULAR; INTRAVENOUS; SUBCUTANEOUS at 09:15

## 2019-04-17 RX ADMIN — MORPHINE SULFATE 2 MG: 2 INJECTION, SOLUTION INTRAMUSCULAR; INTRAVENOUS at 22:30

## 2019-04-17 RX ADMIN — AZITHROMYCIN 250 MG: 250 TABLET, FILM COATED ORAL at 08:16

## 2019-04-17 RX ADMIN — LORAZEPAM 2 MG: 2 INJECTION INTRAMUSCULAR; INTRAVENOUS at 20:39

## 2019-04-17 RX ADMIN — AMPICILLIN SODIUM AND SULBACTAM SODIUM 1.5 G: 1; .5 INJECTION, POWDER, FOR SOLUTION INTRAMUSCULAR; INTRAVENOUS at 15:11

## 2019-04-17 RX ADMIN — LORAZEPAM 1 MG: 1 TABLET ORAL at 12:41

## 2019-04-17 RX ADMIN — AMPICILLIN SODIUM AND SULBACTAM SODIUM 1.5 G: 1; .5 INJECTION, POWDER, FOR SOLUTION INTRAMUSCULAR; INTRAVENOUS at 20:31

## 2019-04-17 RX ADMIN — TAMSULOSIN HYDROCHLORIDE 0.4 MG: 0.4 CAPSULE ORAL at 08:16

## 2019-04-17 RX ADMIN — IPRATROPIUM BROMIDE AND ALBUTEROL SULFATE 1 AMPULE: .5; 3 SOLUTION RESPIRATORY (INHALATION) at 19:44

## 2019-04-17 RX ADMIN — CHLORDIAZEPOXIDE HYDROCHLORIDE 25 MG: 25 CAPSULE ORAL at 14:03

## 2019-04-17 RX ADMIN — Medication 10 ML: at 08:18

## 2019-04-17 RX ADMIN — PHENYTOIN SODIUM 200 MG: 100 CAPSULE ORAL at 08:16

## 2019-04-17 RX ADMIN — DOCUSATE SODIUM 100 MG: 100 CAPSULE, LIQUID FILLED ORAL at 08:16

## 2019-04-17 RX ADMIN — IPRATROPIUM BROMIDE AND ALBUTEROL SULFATE 1 AMPULE: .5; 3 SOLUTION RESPIRATORY (INHALATION) at 15:23

## 2019-04-17 RX ADMIN — Medication 10 ML: at 20:13

## 2019-04-17 RX ADMIN — IPRATROPIUM BROMIDE AND ALBUTEROL SULFATE 1 AMPULE: .5; 3 SOLUTION RESPIRATORY (INHALATION) at 08:55

## 2019-04-17 RX ADMIN — FAMOTIDINE 20 MG: 20 TABLET ORAL at 08:16

## 2019-04-17 RX ADMIN — GABAPENTIN 300 MG: 300 CAPSULE ORAL at 08:16

## 2019-04-17 RX ADMIN — IPRATROPIUM BROMIDE AND ALBUTEROL SULFATE 1 AMPULE: .5; 3 SOLUTION RESPIRATORY (INHALATION) at 11:59

## 2019-04-17 RX ADMIN — AMPICILLIN SODIUM AND SULBACTAM SODIUM 1.5 G: 1; .5 INJECTION, POWDER, FOR SOLUTION INTRAMUSCULAR; INTRAVENOUS at 03:11

## 2019-04-17 RX ADMIN — SODIUM CHLORIDE: 9 INJECTION, SOLUTION INTRAVENOUS at 09:14

## 2019-04-17 RX ADMIN — CHLORDIAZEPOXIDE HYDROCHLORIDE 25 MG: 25 CAPSULE ORAL at 08:17

## 2019-04-17 RX ADMIN — LORAZEPAM 1 MG: 1 TABLET ORAL at 08:16

## 2019-04-17 RX ADMIN — AMPICILLIN SODIUM AND SULBACTAM SODIUM 1.5 G: 1; .5 INJECTION, POWDER, FOR SOLUTION INTRAMUSCULAR; INTRAVENOUS at 08:17

## 2019-04-17 ASSESSMENT — PAIN - FUNCTIONAL ASSESSMENT
PAIN_FUNCTIONAL_ASSESSMENT: PREVENTS OR INTERFERES SOME ACTIVE ACTIVITIES AND ADLS
PAIN_FUNCTIONAL_ASSESSMENT: PREVENTS OR INTERFERES SOME ACTIVE ACTIVITIES AND ADLS

## 2019-04-17 ASSESSMENT — PAIN DESCRIPTION - FREQUENCY
FREQUENCY: CONTINUOUS
FREQUENCY: CONTINUOUS

## 2019-04-17 ASSESSMENT — PAIN DESCRIPTION - ORIENTATION
ORIENTATION: RIGHT
ORIENTATION: RIGHT

## 2019-04-17 ASSESSMENT — PAIN SCALES - GENERAL
PAINLEVEL_OUTOF10: 0
PAINLEVEL_OUTOF10: 6
PAINLEVEL_OUTOF10: 0

## 2019-04-17 ASSESSMENT — PAIN DESCRIPTION - PAIN TYPE
TYPE: ACUTE PAIN
TYPE: ACUTE PAIN

## 2019-04-17 ASSESSMENT — PAIN DESCRIPTION - PROGRESSION
CLINICAL_PROGRESSION: NOT CHANGED
CLINICAL_PROGRESSION: NOT CHANGED

## 2019-04-17 ASSESSMENT — PAIN DESCRIPTION - ONSET
ONSET: AWAKENED FROM SLEEP
ONSET: AWAKENED FROM SLEEP

## 2019-04-17 ASSESSMENT — PAIN DESCRIPTION - LOCATION
LOCATION: ARM
LOCATION: ARM

## 2019-04-17 ASSESSMENT — PAIN DESCRIPTION - DESCRIPTORS
DESCRIPTORS: CONSTANT;NAGGING
DESCRIPTORS: CONSTANT;NAGGING

## 2019-04-17 NOTE — PROGRESS NOTES
Patient called 911. Patient stated he was being kidnapped. Notified charge nurse. Patient scored a 15 on the ciwa scale. PRN ativan was administered as ordered. Hospitalist aware that patient refused his evening medications. [place] in place. Draining well. Telecam in place. Will continue to monitor and assess.

## 2019-04-17 NOTE — PROGRESS NOTES
Patient refused medications. Hospitalist notified vie perfect serve. [place] in place. Telemonitor in place. Patient scored an 8 on the ciwa scale. Will continue to monitor. Patient next to the nursing station. Seizure precautions in place.

## 2019-04-17 NOTE — PROGRESS NOTES
[facility] Orthopedic Surgery   Progress Note      S/P :  SUBJECTIVE  Groggy but opens eyes to name and follows simple commands well. Confused overnight. Telesitter in room. Pain is   described in right shoulder and right hip and with the intensity of None reported, he appears in no distress at this time. OBJECTIVE              Physical                      VITALS:  /62   Pulse 116   Temp 98.6 °F (37 °C) (Oral)   Resp 16   Ht 5' 9\" (1.753 m)   Wt 168 lb 3.4 oz (76.3 kg)   SpO2 90%   BMI 24.84 kg/m²                     MUSCULOSKELETAL:  right foot NVI. Wiggles toes to command. Pedal pulses are palpable. right hand NVI. Wiggles fingers to command. Radial pulses are palpable. No notable right shoulder or hip deformity                   NEUROLOGIC:                                  Sensory:  Touch:  Right Upper Extremity:  normal  Right Lower Extremity:  normal                                                 Surgical wound appears clean and dry ABD pads reinforcing saturated surgical dressing with red drainage.  RN will change today to ABD with Tegaderm    Data       CBC:   Lab Results   Component Value Date    WBC 14.4 04/17/2019    RBC 2.42 04/17/2019    HGB 8.5 04/17/2019    HCT 24.6 04/17/2019    .6 04/17/2019    MCH 35.0 04/17/2019    MCHC 34.4 04/17/2019    RDW 13.5 04/17/2019     04/17/2019    MPV 10.4 04/17/2019        WBC:    Lab Results   Component Value Date    WBC 14.4 04/17/2019        Hemoglobin/Hematocrit:    Lab Results   Component Value Date    HGB 8.5 04/17/2019    HCT 24.6 04/17/2019        PT/INR:    Lab Results   Component Value Date    PROTIME 11.6 04/15/2019    INR 1.02 04/15/2019              Current Inpatient Medications             Current Facility-Administered Medications: chlordiazePOXIDE (LIBRIUM) capsule 25 mg, 25 mg, Oral, TID  [START ON 4/18/2019] vitamin B-1 (THIAMINE) tablet 100 mg, 100 mg, Oral, Daily  [START ON 7/52/8331] folic acid (FOLVITE) tablet 1 mg, 1 mg, mg, Intravenous, Q2H PRN  gabapentin (NEURONTIN) capsule 300 mg, 300 mg, Oral, TID    ASSESSMENT AND PLAN    Fall   Right hip IT fx, post nailing yesterday, 50% WB  Right prox humerus fx, nonop, NWB  ETOH abuse, on CIWA scale  Spoke to his brother yesterday, he will be in [address] Friday.  Plan [facility] at [place] if insurance approves  PT OT to see    Adis Arias  4/17/2019  9:02 AM

## 2019-04-17 NOTE — PROGRESS NOTES
Patient's antibiotics infusing as ordered. Seizure precautions in place. Tele sitter in place. Patient is on 2 liters of oxygen. Will continue to monitor and assess.

## 2019-04-17 NOTE — PROGRESS NOTES
Physical Therapy    Theresa Orf  4/17/2019  Attempted to see for PT evaluation along with OT.  -he is too sleepy and not able to stay awake for more than a few moments  -repositioned in bed and placed pillow-soft bolsters to help keep him off the bed rails  -will check in this afternoon to see of able to proceed  Electronically signed by Dharmesh Iglesias, PT on 4/17/2019 at 11:07 AM

## 2019-04-17 NOTE — PROGRESS NOTES
Pulmonary Progress Note    CC:  Follow up hypoxia, cough, surgery    Subjective: Went to OR yesterday for femur repain and nailing  On room air  Low grade temp  Does not known recent events  Denies SOB or coughing       Intake/Output Summary (Last 24 hours) at 4/17/2019 0837  Last data filed at 4/17/2019 0340  Gross per 24 hour   Intake 1700 ml   Output 850 ml   Net 850 ml         PHYSICAL EXAM:  Blood pressure 110/62, pulse 116, temperature 98.6 °F (37 °C), temperature source Oral, resp. rate 16, height 5' 9\" (1.753 m), weight 168 lb 3.4 oz (76.3 kg), SpO2 92 %.'  Gen: Chronically ill, lethargic. Eyes: PERRL. No sclera icterus. No conjunctival injection. ENT: No discharge. Pharynx clear. External appearance of ears and nose normal.  Neck: Trachea midline. No obvious mass. Resp: Wheezing. CV: Regular rate. Regular rhythm. No murmur or rub. GI: Non-tender. Non-distended. No hernia. Skin: Warm, dry, normal texture and turgor. No nodule on exposed extremities. Lymph: No cervical LAD. No supraclavicular LAD. M/S: No cyanosis. No clubbing. No joint deformity.     Neuro: Lethargic, confused, non-focal  Ext:   no edema, arm in sling    Medications:    Scheduled Meds:   chlordiazePOXIDE  25 mg Oral TID    [START ON 4/18/2019] thiamine  100 mg Oral Daily    [START ON 6/05/9952] folic acid  1 mg Oral Daily    ampicillin-sulbactam  1.5 g Intravenous Q6H    sodium chloride flush  10 mL Intravenous 2 times per day    docusate sodium  100 mg Oral BID    enoxaparin  40 mg Subcutaneous Nightly    folic acid, thiamine, multi-vitamin with vitamin K infusion   Intravenous Daily    ipratropium-albuterol  1 ampule Inhalation Q4H WA    azithromycin  250 mg Oral Daily    famotidine  20 mg Oral BID    phenytoin  200 mg Oral BID    tamsulosin  0.4 mg Oral Daily    gabapentin  300 mg Oral TID       Continuous Infusions:   sodium chloride 100 mL/hr at 04/16/19 1814       PRN Meds:  sodium chloride flush, ondansetron, LORazepam **OR** LORazepam **OR** LORazepam **OR** LORazepam **OR** LORazepam **OR** LORazepam **OR** LORazepam **OR** LORazepam, magnesium hydroxide, acetaminophen, cyclobenzaprine, oxyCODONE-acetaminophen **OR** oxyCODONE-acetaminophen, morphine **OR** morphine    Labs:  CBC:   Recent Labs     04/15/19  0520 04/17/19  0507   WBC 8.6 14.4*   HGB 10.7* 8.5*   HCT 31.0* 24.6*   .3* 101.6*    166     BMP:   Recent Labs     04/15/19  0520 04/17/19  0507    138   K 4.1 3.7   CL 99 101   CO2 29 29   BUN 13 8   CREATININE 0.7* <0.5*     LIVER PROFILE:   Recent Labs     04/15/19  0520 04/17/19  0507   AST 42* 33   ALT 26 24   BILITOT 0.5 0.7   ALKPHOS 122 97     PT/INR:   Recent Labs     04/15/19  0520   PROTIME 11.6   INR 1.02     APTT:   Recent Labs     04/15/19  0520   APTT 30.6     UA:No results for input(s): NITRITE, COLORU, PHUR, LABCAST, WBCUA, RBCUA, MUCUS, TRICHOMONAS, YEAST, BACTERIA, CLARITYU, SPECGRAV, LEUKOCYTESUR, UROBILINOGEN, BILIRUBINUR, BLOODU, GLUCOSEU, AMORPHOUS in the last 72 hours. Invalid input(s): Maira Bingham  No results for input(s): PH, PCO2, PO2 in the last 72 hours.         Films:  Chest imaging reports were reviewed and imaging was reviewed by me and showed no new films    ABG:  None    Cultures:  None    I reviewed the labs and images listed above    ASSESSMENT:  · Dyspnea  · Possible bronchitis  · Tobacco abuse  · ETOH abuse     PLAN:  · Titrate oxygen for saturations greater than or equal to 92%  · Unasyn and azithromycin as is  · Duonebs q 4 hours  · Sputum cultures (if able)  · Procalcitonin level tomorrow (could be falsely higher due to surgery yesterday)  · DVT prophylaxis with lovenox        Lucilleomar Senior, [facility]

## 2019-04-17 NOTE — PROGRESS NOTES
Occupational Therapy  Evaluation Attempt     Chart reviewed and OT evaluation attempted. Patient currently too sleepy, unable to remain alert. Pt repositioned in bed for comfort. Bed alarm activated, telesitter in room and call light and needs in reach. Will follow up as therapist's schedule permits later this date. Thank you,  Melissa Cerda, OTR/L #[id number]     Time in 10:50  Time out 11:05  Total time 15 minutes     14:30 - Pt met bedside for OT evaluation. Pt remains asleep supine in bed. Pt not easily awoken. Will defer OT evaluation at this time and follow up tomorrow to assess mobility and ADL needs.      Melissa Cerda OTR/L #[id number]

## 2019-04-17 NOTE — PROGRESS NOTES
mass.    Resp: No accessory muscle use. No crackles. No wheezes. Few rhonchi. No dullness on percussion. CV: Regular rate. Regular rhythm. No murmur or rub. No edema. GI: Non-tender. Non-distended. No hernia. Skin: Warm, dry, normal texture and turgor. Rash + L arm  Lymph: No cervical LAD. No supraclavicular LAD. M/S: No cyanosis. No clubbing. R arm in sling, leg movement limited    Neuro: Moves all four extremities. CN 2-12 tested, no defect noted. Psych: Oriented x 1. Confused hallucinations +    Data    LABS  CBC:   Recent Labs     04/15/19  0520 04/17/19  0507   WBC 8.6 14.4*   HGB 10.7* 8.5*   HCT 31.0* 24.6*   .3* 101.6*    166     BMP:   Recent Labs     04/15/19  0520 04/17/19  0507    138   K 4.1 3.7   CL 99 101   CO2 29 29   BUN 13 8   CREATININE 0.7* <0.5*     POC GLUCOSE:  No results for input(s): POCGLU in the last 72 hours. LIVER PROFILE:   Recent Labs     04/15/19  0520 04/17/19  0507   AST 42* 33   ALT 26 24   LABALBU 2.3* 2.1*   BILITOT 0.5 0.7   ALKPHOS 122 97     PT/INR:   Recent Labs     04/15/19  0520   PROTIME 11.6   INR 1.02     APTT:   Recent Labs     04/15/19  0520   APTT 30.6     UA:No results for input(s): NITRITE, COLORU, PHUR, LABCAST, WBCUA, RBCUA, MUCUS, TRICHOMONAS, YEAST, BACTERIA, CLARITYU, SPECGRAV, LEUKOCYTESUR, UROBILINOGEN, BILIRUBINUR, BLOODU, GLUCOSEU, KETUA, AMORPHOUS in the last 72 hours. Microbiology:  Wound Culture: No results for input(s): WNDABS, ORG in the last 72 hours. Invalid input(s):  LABGRAM  Nasal Culture: No results for input(s): ORG, MRSAPCR in the last 72 hours. Blood Culture: No results for input(s): BC, BLOODCULT2 in the last 72 hours. Fungal Culture:   No results for input(s): FUNGSM in the last 72 hours. No results for input(s): FUNCXBLD in the last 72 hours.   CSF Culture:  No results for input(s): COLORCSF, APPEARCSF, CFTUBE, CLOTCSF, WBCCSF, RBCCSF, NEUTCSF, NUMCELLSCSF, LYMPHSCSF, MONOCSF, GLUCCSF, VOLCSF in the last 72 hours. Respiratory Culture:  No results for input(s): Breonna Pries in the last 72 hours. AFB:No results for input(s): AFBSMEAR in the last 72 hours. Urine Culture  No results for input(s): LABURIN in the last 72 hours. RADIOLOGY:    XR HIP RIGHT (2-3 VIEWS)   Final Result   Status post ORIF proximal right femur fracture. Correlate with procedural   report. FLUORO FOR SURGICAL PROCEDURES   Final Result      XR CHEST PORTABLE   Final Result   Unchanged left-sided rib fractures. No underlying pneumothorax or pleural   effusion. Suspect lower right-sided rib fractures, age indeterminate. XR HIP RIGHT (2-3 VIEWS)   Final Result   Proximal right femoral shaft fracture with suspected involvement of the   lesser trochanter. XR FEMUR RIGHT (MIN 2 VIEWS)   Final Result   Proximal right femur fracture with involvement of the lesser trochanter. XR SHOULDER RIGHT (MIN 2 VIEWS)   Final Result   Nondisplaced proximal right humeral fracture. CT HEAD WO CONTRAST   Final Result   No acute intracranial abnormality. Areas of encephalomalacia in the inferior right frontal lobe and anterior   right temporal lobe are compatible with sequela of prior trauma. Moderate microvascular ischemic disease. XR CHEST 1 VW   Final Result   Minimally displaced left 7th and 8th rib fractures. No underlying   pneumothorax or pleural effusion. Diffuse small airway inflammation may be seen with asthma, bronchitis or   smoking. No consolidative airspace disease.              CONSULTS:    IP CONSULT TO ORTHOPEDIC SURGERY  IP CONSULT TO HOSPITALIST  IP CONSULT TO SOCIAL WORK  IP CONSULT TO CARDIOLOGY  IP CONSULT TO PULMONOLOGY    ASSESSMENT AND PLAN:      Principal Problem:    Right femoral fracture ([address]  Active Problems:    Fall    Right humeral fracture    Alcohol intoxication ([address]    Hypomagnesemia    Epilepsia ([address]    Closed fracture of neck of right femur ([address] Alcohol dependence (HCC)    Multiple rib fractures    Syncope    Shortness of breath    Bronchitis    Tobacco abuse    ETOH abuse  Resolved Problems:    * No resolved hospital problems. *    Confused, delirious- treat as DT  -  sec to alcohol withdrawal  - start CIWA  -scheduled librium     Right femoral fracture shaft and subtrochanteric  - ortho consulted  - s/p ORIF on 4/16/19     Acute blood loss anemia  - monitor   - sec to surgery     Leucocytosis  - ? Reactive   - monitor     Humeral fracture non displaced    pain management, per orthopedic. R rib fracture  - pain control  -incentive spirometry     Alcohol dependence with abuse  - CIWA   - banana bag      History of epilepsy.  -ct home meds     Hypomagnesemia, replaced. Cough with yellow sputum- treat as bronchitis with h/o smoking  - duo neb  -ssi  -add unasyn to cover aspiration   -procal elevated         DVT Prophylaxis: lovenox  Diet: Dietary Nutrition Supplements: Standard High Calorie Oral Supplement  DIET GENERAL;  Code Status: Full Code    PT/OT Eval Status:post surgery    Discharge plan - ct care     The patient and / or the family were informed of the results of any tests, a time was given to answer questions, a plan was proposed and they agreed with plan. Discussed with consulting physicians, nursing and social work     The note was completed using EMR. Every effort was made to ensure accuracy; however, inadvertent computerized transcription errors may be present.        Mary Kwong MD

## 2019-04-17 NOTE — PROGRESS NOTES
Dr. Rylie Zheng notified via perfect serve that epic is prompting us about early detection of sepsis score indicating a HIGH RISK.   Electronically signed by Leigh Oleary RN on 4/17/2019 at 1:13 PM

## 2019-04-17 NOTE — PLAN OF CARE
Problem: Pain:  Goal: Pain level will decrease  Description  Pain level will decrease  4/17/2019 1139 by Salvador Coombs RN  Outcome: Ongoing  Note:   Patient pain controlled with pharmacologic and non-pharmacologic interventions. Will continue to monitor. Problem: Pain:  Goal: Control of acute pain  Description  Control of acute pain  4/17/2019 1139 by Salvador Coombs RN  Outcome: Ongoing  Note:   Patient pain controlled with pharmacologic and non-pharmacologic interventions. Will continue to monitor. Problem: Pain:  Goal: Control of chronic pain  Description  Control of chronic pain  4/17/2019 1139 by Salvador Coombs RN  Outcome: Ongoing  Note:   Patient pain controlled with pharmacologic and non-pharmacologic interventions. Will continue to monitor. Problem: Pain:  Goal: Patient's pain/discomfort is manageable  Description  Patient's pain/discomfort is manageable  4/17/2019 1139 by Salvador Coombs RN  Outcome: Ongoing  Note:   Patient pain controlled with pharmacologic and non-pharmacologic interventions. Will continue to monitor. Problem: Discharge Planning:  Goal: Discharged to appropriate level of care  Description  Discharged to appropriate level of care  4/17/2019 1139 by Salvador Coombs RN  Outcome: Ongoing  Note:   Patient not anticipating d/c today. Will continue to assess and update as information becomes available. Problem: Fluid Volume - Deficit:  Goal: Absence of fluid volume deficit signs and symptoms  Description  Absence of fluid volume deficit signs and symptoms  4/17/2019 1139 by Salvador Coombs RN  Outcome: Ongoing  Note:   Patient does not present with fluid volume deficit signs or symptoms.       Problem: Nutrition Deficit:  Goal: Ability to achieve adequate nutritional intake will improve  Description  Ability to achieve adequate nutritional intake will improve  4/17/2019 1139 by Salvador Coombs RN  Outcome: Ongoing  Note:   Patient has been sleeping all morning and has not eaten up to this point. Will promote nutrition when the patient awakes. Problem: Sleep Pattern Disturbance:  Goal: Appears well-rested  Description  Appears well-rested  4/17/2019 1139 by Ricardo Mejias RN  Outcome: Ongoing  Note:   Patient has been sleeping most of the morning but states he got a good night sleep last night. Will continue to monitor patient. Problem: Violence - Risk of, Self/Other-Directed:  Goal: Knowledge of developmental care interventions  Description  Absence of violence  4/17/2019 1139 by Ricardo Mejias RN  Outcome: Ongoing  Note:   Patient educated on plan of care throughout the day. Problem: Safety:  Goal: Free from accidental physical injury  Description  Free from accidental physical injury  4/17/2019 1139 by Ricardo Mejias RN  Outcome: Ongoing  Note:   Patient remains free from physical injury during this shift. Will continue to monitor. Problem: Safety:  Goal: Free from intentional harm  Description  Free from intentional harm  4/17/2019 1139 by Ricardo Mejias RN  Outcome: Ongoing  Note:   Patient remains free from physical injury during this shift. Will continue to monitor. Problem: Daily Care:  Goal: Daily care needs are met  Description  Daily care needs are met  4/17/2019 1139 by Ricardo Mejias RN  Outcome: Ongoing  Note:   Patient denies any physical needs at this time. Will continue to monitor. Problem: Skin Integrity:  Goal: Skin integrity will stabilize  Description  Skin integrity will stabilize  4/17/2019 1139 by Ricardo Mejias RN  Outcome: Ongoing     Problem: Discharge Planning:  Goal: Patients continuum of care needs are met  Description  Patients continuum of care needs are met  4/17/2019 1139 by Ricardo Mejias RN  Outcome: Ongoing  Note:   Patient denies any physical needs at this time. Will continue to monitor.       Problem: Risk for Impaired Skin Integrity  Goal: Tissue integrity - skin and mucous membranes  Description  Structural intactness and normal physiological function of skin and  mucous membranes. 4/17/2019 1139 by Angelo Salazar RN  Outcome: Ongoing  Note:   Patient skin integrity and mucus membranes remain unchanged at this time. Problem: Falls - Risk of:  Goal: Will remain free from falls  Description  Will remain free from falls  4/17/2019 1139 by Angelo Salazar RN  Outcome: Ongoing  Note:   Patient remains free from falls during this shift. Will continue to monitor. Problem: Falls - Risk of:  Goal: Absence of physical injury  Description  Absence of physical injury  4/17/2019 1139 by Angelo Salazar RN  Outcome: Ongoing  Note:   Patient remains free from physical injury during this shift. Will continue to monitor.

## 2019-04-17 NOTE — DISCHARGE INSTR - DIET
 Good nutrition is important when healing from an illness, injury, or surgery. Follow any nutrition recommendations given to you during your hospital stay.  If you were given an oral nutrition supplement while in the hospital, continue to take this supplement at home. You can take it with meals, in-between meals, and/or before bedtime. These supplements can be purchased at most local grocery stores, pharmacies, and chain super-stores.  If you have any questions about your diet or nutrition, call the hospital and ask for the dietitian. [facility]) Continuity of Care Form    Patient Name:  Eliceo Villegas  : 1955    MRN:  [id number]    Admit date:  2019  Discharge date:  ***    Code Status Order: Full Code  Advance Directives: {YES OR NO:}    Admitting Physician: Jordyn Phillips MD  PCP: Jorge Perdomo MD    Discharging Nurse: [facility] Unit/Room#: D7R-0913/9017-81  Discharging Unit Phone Number: ***    Emergency Contact:        Past Surgical History:  History reviewed. No pertinent surgical history. Immunization History: There is no immunization history on file for this patient. Active Problems:  Principal Problem:    Right femoral fracture [facility])  Active Problems:    Fall    Right humeral fracture    Alcohol intoxication ([facility]    Hypomagnesemia    Epilepsia ([facility]    Closed fracture of neck of right femur (HCC)    Al[facility] dependence ([facility]    Multiple rib fractures    Syncope    Shortness of breath    Bronchitis    Tobacco abuse    ETOH abuse  Resolved Problems:    * No resolved hospital problems.  *      Isolation/Infection:       Nurse Assessment:  Last Vital Signs:/62   Pulse 116   Temp 98.6 °F (37 °C) (Oral)   Resp 16   Ht 5' 9\" (1.753 m)   Wt 168 lb 3.4 oz (76.3 kg)   SpO2 92%   BMI 24.84 kg/m²   Last documented pain score (0-10 scale): Pain Level: 0  Last Weight:   Wt Readings from Last 1 Encounters:   04/15/19 168 lb 3.4 oz (76.3 kg)     Mental Status:  {IP PT MENTAL STATUS:05202}     IV Access:  { [facility]O IV ACCESS:205860182}    Nursing Mobility/ADLs:  Walking   {P DME UXWS:286786753}  Transfer  {[facility] DME IRJA:280764004}  Bathing  {[facility]P DME PLTC:096399477}  Dressing  {P DME KPLS:588943896}  Toileting  {[facility] DME HOY}  Feeding  {[facility] DME EHPY:160598980}  Med Admin  {[facility] DME WXER:533496510}  Med Delivery   { [facility] MED Delivery:428752782}    Wound Care Documentation and Therapy:  Change dressing daily and cleanse wound with rubbing alcohol Apply dry dressing and tegaderm. Can leave wound to air on POD#7 if no drainage  If staples are in place they will need to be removed on or by POD#14. If questions about getting them removed please call Dr Sherri Figueroa assistant, Vipin Pelaez,  at office.   [phone]        Elimination:  Urinary Catheter: {Urinary Catheter:493732760}   Colostomy/Ileostomy: {YES / ZE:58256}  Continence:   · Bowel: {YES / GY:96527}  · Bladder: {YES / EJ:32656}  Date of Last BM: ***    Intake/Output Summary (Last 24 hours)     Intake/Output Summary (Last 24 hours) at 2019 8704  Last data filed at 2019 0340  Gross per 24 hour   Intake 1700 ml   Output 850 ml   Net 850 ml     Safety Concerns:     [employer] Safety Concerns:651554770}    Impairments/Disabilities:      [employer] Impairments/Disabilities:266238367}    Nutrition Therapy:  Current Nutrition Therapy: Dietary Nutrition Supplements: Standard High Calorie Oral Supplement  DIET GENERAL;  Routes of Feeding: {[facility] DME Other Feedings:676275332}  Liquids: {Slp liquid thickness:83223}  Daily Fluid Restriction: {P DME Yes amt example:098470815}  Last Modified Barium Swallow with Video (Video Swallowing Test): {Done Not Done ZCQO:804214222}    Treatments at the Time of Hospital Discharge:   Respiratory Treatments: ***  Oxygen Therapy:  {Therapy; copd oxygen:93681}  Ventilator:    { CC Vent YGLH:217318874}    Lab orders for discharge:        Rehab Therapies: Physical Therapy, Occupational Therapy and nursing care  Weight Bearing Status/Restrictions: Nonweightbearing right arm, 50% weightbearing right leg  Other Medical Equipment (for information only, NOT a DME order): rolling walker   Other Treatments: ***    Patient's personal belongings (please select all that are sent with patient):  {CHP DME Belongings:867261694}    RN SIGNATURE:  {Esignature:442153425}    PHYSICIAN SECTION    Prognosis: Good    Condition at Discharge: Stable    Rehab Potential (if transferring to Rehab): Good    Physician Certification: I certify the above orders, information, and transfer of Monica Ruggiero is necessary for the continuing treatment of the diagnosis listed and that he requires [place] for less 30 days. Update Admission H&P: No change in H&P    PHYSICIAN SIGNATURE:  Electronically signed by YESY Patino CNP on 4/17/19 at 8:28 AM/ Dr Claudine Pink Status Date: ***    [facility] Readmission Risk Assessment Score:    Discharging to Facility/ Agency   · Name:   · Address:  · Phone:  · Fax:    Dialysis Facility (if applicable)   · Name:  · Address:  · Dialysis Schedule:  · Phone:  · Fax:    / signature: {Esignature:[id number]}      Lovenox for 14 days after fracture surgery followed by 14 days of Aspirin 325mg.      Followup Dr. Hoffman Fees in 2 weeks in office     [address] [facility] and [address] [facility], [address] [facility], [address] [place] [address], [phone]

## 2019-04-17 NOTE — PLAN OF CARE
Will monitor skin and mucous members. Will turn patient every 2 hours, monitor for friction and sheering, and change dressings as needed. Will preform skin assessment every shift. Fall risk assessment completed. Fall precautions in place. Call light within reach. Pt educated on calling for assistance before getting up. Walkway free of clutter. Will continue to monitor. Pt assessed for pain. Pt denies any pain at this time. Will continue to monitor pt and assess for pain throughout rest of shift. Patient was assessed for ciwa scale during the shift. PRN medication was administered as ordered.

## 2019-04-17 NOTE — PROGRESS NOTES
Patient has been sleeping for a majority of the day. Patient does not appear to be in any distress at this time. IV clean, dry, and intact. Fall precautions in place - bed alarm on, bed locked in lowest position, tele cam in place. Will continue to monitor.

## 2019-04-18 LAB
BASOPHILS ABSOLUTE: 0 K/UL (ref 0–0.2)
BASOPHILS RELATIVE PERCENT: 0 %
EOSINOPHILS ABSOLUTE: 0.3 K/UL (ref 0–0.6)
EOSINOPHILS RELATIVE PERCENT: 3 %
HCT VFR BLD CALC: 23.9 % (ref 40.5–52.5)
HEMOGLOBIN: 8.1 G/DL (ref 13.5–17.5)
LYMPHOCYTES ABSOLUTE: 1.3 K/UL (ref 1–5.1)
LYMPHOCYTES RELATIVE PERCENT: 11 %
MCH RBC QN AUTO: 35 PG (ref 26–34)
MCHC RBC AUTO-ENTMCNC: 33.8 G/DL (ref 31–36)
MCV RBC AUTO: 103.3 FL (ref 80–100)
MONOCYTES ABSOLUTE: 0.8 K/UL (ref 0–1.3)
MONOCYTES RELATIVE PERCENT: 7 %
NEUTROPHILS ABSOLUTE: 9.1 K/UL (ref 1.7–7.7)
NEUTROPHILS RELATIVE PERCENT: 79 %
PDW BLD-RTO: 13.5 % (ref 12.4–15.4)
PLATELET # BLD: 168 K/UL (ref 135–450)
PMV BLD AUTO: 9.8 FL (ref 5–10.5)
PROCALCITONIN: 0.19 NG/ML (ref 0–0.15)
RBC # BLD: 2.31 M/UL (ref 4.2–5.9)
RBC # BLD: NORMAL 10*6/UL
WBC # BLD: 11.5 K/UL (ref 4–11)

## 2019-04-18 PROCEDURE — 97530 THERAPEUTIC ACTIVITIES: CPT

## 2019-04-18 PROCEDURE — 2700000000 HC OXYGEN THERAPY PER DAY

## 2019-04-18 PROCEDURE — 97166 OT EVAL MOD COMPLEX 45 MIN: CPT

## 2019-04-18 PROCEDURE — 6370000000 HC RX 637 (ALT 250 FOR IP): Performed by: ORTHOPAEDIC SURGERY

## 2019-04-18 PROCEDURE — 97535 SELF CARE MNGMENT TRAINING: CPT

## 2019-04-18 PROCEDURE — 94761 N-INVAS EAR/PLS OXIMETRY MLT: CPT

## 2019-04-18 PROCEDURE — 36415 COLL VENOUS BLD VENIPUNCTURE: CPT

## 2019-04-18 PROCEDURE — 1200000000 HC SEMI PRIVATE

## 2019-04-18 PROCEDURE — 6360000002 HC RX W HCPCS: Performed by: ORTHOPAEDIC SURGERY

## 2019-04-18 PROCEDURE — 97162 PT EVAL MOD COMPLEX 30 MIN: CPT

## 2019-04-18 PROCEDURE — 85025 COMPLETE CBC W/AUTO DIFF WBC: CPT

## 2019-04-18 PROCEDURE — 94640 AIRWAY INHALATION TREATMENT: CPT

## 2019-04-18 PROCEDURE — 2580000003 HC RX 258: Performed by: ORTHOPAEDIC SURGERY

## 2019-04-18 PROCEDURE — 84145 PROCALCITONIN (PCT): CPT

## 2019-04-18 PROCEDURE — 6370000000 HC RX 637 (ALT 250 FOR IP): Performed by: INTERNAL MEDICINE

## 2019-04-18 PROCEDURE — 99232 SBSQ HOSP IP/OBS MODERATE 35: CPT | Performed by: INTERNAL MEDICINE

## 2019-04-18 RX ORDER — CHLORDIAZEPOXIDE HYDROCHLORIDE 25 MG/1
25 CAPSULE, GELATIN COATED ORAL 3 TIMES DAILY
Status: DISPENSED | OUTPATIENT
Start: 2019-04-18 | End: 2019-04-19

## 2019-04-18 RX ADMIN — IPRATROPIUM BROMIDE AND ALBUTEROL SULFATE 1 AMPULE: .5; 3 SOLUTION RESPIRATORY (INHALATION) at 07:27

## 2019-04-18 RX ADMIN — AMPICILLIN SODIUM AND SULBACTAM SODIUM 1.5 G: 1; .5 INJECTION, POWDER, FOR SOLUTION INTRAMUSCULAR; INTRAVENOUS at 21:04

## 2019-04-18 RX ADMIN — IPRATROPIUM BROMIDE AND ALBUTEROL SULFATE 1 AMPULE: .5; 3 SOLUTION RESPIRATORY (INHALATION) at 15:32

## 2019-04-18 RX ADMIN — FOLIC ACID 1 MG: 1 TABLET ORAL at 10:00

## 2019-04-18 RX ADMIN — PHENYTOIN SODIUM 200 MG: 100 CAPSULE ORAL at 21:10

## 2019-04-18 RX ADMIN — GABAPENTIN 300 MG: 300 CAPSULE ORAL at 15:13

## 2019-04-18 RX ADMIN — CHLORDIAZEPOXIDE HYDROCHLORIDE 25 MG: 25 CAPSULE ORAL at 09:59

## 2019-04-18 RX ADMIN — GABAPENTIN 300 MG: 300 CAPSULE ORAL at 21:10

## 2019-04-18 RX ADMIN — SODIUM CHLORIDE: 9 INJECTION, SOLUTION INTRAVENOUS at 02:33

## 2019-04-18 RX ADMIN — ENOXAPARIN SODIUM 40 MG: 40 INJECTION SUBCUTANEOUS at 21:09

## 2019-04-18 RX ADMIN — Medication 100 MG: at 10:00

## 2019-04-18 RX ADMIN — TAMSULOSIN HYDROCHLORIDE 0.4 MG: 0.4 CAPSULE ORAL at 10:00

## 2019-04-18 RX ADMIN — PHENYTOIN SODIUM 200 MG: 100 CAPSULE ORAL at 10:00

## 2019-04-18 RX ADMIN — LORAZEPAM 2 MG: 2 INJECTION INTRAMUSCULAR; INTRAVENOUS at 05:48

## 2019-04-18 RX ADMIN — AMPICILLIN SODIUM AND SULBACTAM SODIUM 1.5 G: 1; .5 INJECTION, POWDER, FOR SOLUTION INTRAMUSCULAR; INTRAVENOUS at 10:00

## 2019-04-18 RX ADMIN — AMPICILLIN SODIUM AND SULBACTAM SODIUM 1.5 G: 1; .5 INJECTION, POWDER, FOR SOLUTION INTRAMUSCULAR; INTRAVENOUS at 02:52

## 2019-04-18 RX ADMIN — Medication 10 ML: at 10:16

## 2019-04-18 RX ADMIN — AMPICILLIN SODIUM AND SULBACTAM SODIUM 1.5 G: 1; .5 INJECTION, POWDER, FOR SOLUTION INTRAMUSCULAR; INTRAVENOUS at 16:09

## 2019-04-18 RX ADMIN — DOCUSATE SODIUM 100 MG: 100 CAPSULE, LIQUID FILLED ORAL at 10:00

## 2019-04-18 RX ADMIN — LORAZEPAM 2 MG: 2 INJECTION INTRAMUSCULAR; INTRAVENOUS at 02:15

## 2019-04-18 RX ADMIN — FAMOTIDINE 20 MG: 20 TABLET ORAL at 10:00

## 2019-04-18 RX ADMIN — GABAPENTIN 300 MG: 300 CAPSULE ORAL at 10:00

## 2019-04-18 RX ADMIN — IPRATROPIUM BROMIDE AND ALBUTEROL SULFATE 1 AMPULE: .5; 3 SOLUTION RESPIRATORY (INHALATION) at 11:22

## 2019-04-18 RX ADMIN — AZITHROMYCIN 250 MG: 250 TABLET, FILM COATED ORAL at 10:00

## 2019-04-18 RX ADMIN — LORAZEPAM 2 MG: 2 INJECTION INTRAMUSCULAR; INTRAVENOUS at 03:43

## 2019-04-18 RX ADMIN — IPRATROPIUM BROMIDE AND ALBUTEROL SULFATE 1 AMPULE: .5; 3 SOLUTION RESPIRATORY (INHALATION) at 20:15

## 2019-04-18 ASSESSMENT — PAIN SCALES - GENERAL
PAINLEVEL_OUTOF10: 0

## 2019-04-18 NOTE — PROGRESS NOTES
Family in room at this time talked with them about plan and also informed them SS is involved and family would like to talk with her SS informed and will be in room, pt very drowsy still but does arouse some when talking but then goes back to sleep.   Electronically signed by Susy Bell RN on 4/18/2019 at 2:34 PM

## 2019-04-18 NOTE — PROGRESS NOTES
Physical Therapy    Facility/Department: [address] ORTHOPEDICS  Initial Assessment    NAME: Bobbi Kimball  : 1955  MRN: [id number]    Date of Service: 2019    Discharge Recommendations:  Bobbi Kimball scored a 8/24 on the AM-PAC short mobility form. Current research shows that an AM-PAC score of 17 or less is typically not associated with a discharge to the patient's home setting. Based on the patients AM-PAC score and their current functional mobility deficits, it is recommended that the patient have 3-5 sessions per week of Physical Therapy at d/c to increase the patients independence. Assessment   Body structures, Functions, Activity limitations: Decreased functional mobility ; Decreased ADL status; Decreased safe awareness;Decreased ROM; Decreased balance;Decreased cognition  Prognosis: Poor; Fair  Decision Making: Medium Complexity  REQUIRES PT FOLLOW UP: Yes  Activity Tolerance  Activity Tolerance: Patient limited by cognitive status       Patient Diagnosis(es): The primary encounter diagnosis was Closed displaced fracture of lesser trochanter of right femur, initial encounter ([facility] [address] Diagnoses of Closed fracture of proximal end of right humerus, unspecified fracture morphology, initial encounter, Fall from slip, trip, or stumble, initial encounter, and Closed fracture of multiple ribs of left side, initial encounter were also pertinent to this visit. has a past medical history of BPH (benign prostatic hyperplasia) and Seizures ([address] has a past surgical history that includes Femur fracture surgery (Right, 2019).     Restrictions  Restrictions/Precautions  Restrictions/Precautions: Fall Risk  Position Activity Restriction  Other position/activity restrictions: WBAT LEs    NWB right UE  Vision/Hearing  Vision: (wears glasses)  Hearing: (not able to accurately assess)     Subjective  General  Patient assessed for rehabilitation services?: Yes  Additional Pertinent Hx: here due to fall with right hip and right proximal humerus fractures in context of EtOH abuse history  Response To Previous Treatment: Not applicable  Family / Caregiver Present: No  Follows Commands: Impaired  Subjective  Subjective: sleeping at arrival- attempted to see yesterday but not able to gain sufficient wakefulness to proceed- at this time able to at least awaken sufficient to engage for PT OT assessments   -he is not oriented and mumbling nonsense  Pain Screening  Patient Currently in Pain: (not able rate- winces moderately with movement but then falls off to sleep quickly)  Social/Functional History  Social/Functional History  Additional Comments: patient not able to provide intake information and no family has been here (chart notes indicate a brother may be here on Friday)    Objective  Not able to formally assess ROM and strength in this session - appears grossly functional in uninvolved limbs as observed with functional activity  Motor Control  Gross Motor?: (not able to assess formally)  Sensation  Overall Sensation Status: (not able to assess formally)  Bed mobility  Supine to Sit: 2 Person assistance;Maximum assistance  Sit to Supine: Unable to assess(not observed -anticipate max of 2)  Transfers  Sit to Stand: 2 Person Assistance;Maximum Assistance(in silvia stedy)  Stand to sit: Maximum Assistance;2 Person Assistance(from silvia stedy)  Ambulation  Ambulation?: No  Stairs/Curb  Stairs?: No     Balance  Comments: once feet on floor he was able to hold in midline with close cga (very lethargic)     max of 2 in static stance in silvia stance - not able to gain a normal upright stance        Plan   Plan  Times per week: 3-5 while on acute floor  Current Treatment Recommendations: Functional Mobility Training, Transfer Training, Positioning, ADL/Self-care Training  Safety Devices  Type of devices:  All fall risk precautions in place, Left in chair, Call light within reach, Nurse notified, Chair alarm in place    AM-PAC

## 2019-04-18 NOTE — CARE COORDINATION
Sw aware that [facility] declined patient. Chilltime still reviewing. Sw called brother, Val Resides ([phone]), regarding above. Jonah's mother answered (also patient's mother). Val Resides not at home at the present. Sw informed mother of above and requested permission to call additional facilities in the area. She is agreeable to this and reported that her other son, Reji Doll, lives on [facility] and they would like a facility close to Reji Doll. Sharda made referrals to [facility], [facility], [facility], [place], [place], and [facility].      Electronically signed by Shelva Lesches on 4/18/2019 at 12:52 PM

## 2019-04-18 NOTE — PROGRESS NOTES
Progress Note  Admit Date: 2019      PCP: Uriel Daniel MD     CC: F/U for fall    SUBJECTIVE / Interval History:  Very sleepy , confused   Overnight agitated       Allergies  Patient has no known allergies. Medications    Scheduled Meds:   thiamine  100 mg Oral Daily    folic acid  1 mg Oral Daily    ampicillin-sulbactam  1.5 g Intravenous Q6H    sodium chloride flush  10 mL Intravenous 2 times per day    docusate sodium  100 mg Oral BID    enoxaparin  40 mg Subcutaneous Nightly    ipratropium-albuterol  1 ampule Inhalation Q4H WA    azithromycin  250 mg Oral Daily    famotidine  20 mg Oral BID    phenytoin  200 mg Oral BID    tamsulosin  0.4 mg Oral Daily    gabapentin  300 mg Oral TID     Continuous Infusions:   sodium chloride 100 mL/hr at 19 0233       PRN Meds:  sodium chloride flush, ondansetron, LORazepam **OR** LORazepam **OR** LORazepam **OR** LORazepam **OR** LORazepam **OR** LORazepam **OR** LORazepam **OR** LORazepam, magnesium hydroxide, acetaminophen, cyclobenzaprine, oxyCODONE-acetaminophen **OR** oxyCODONE-acetaminophen, morphine **OR** morphine    Vitals    TEMPERATURE:  Current - Temp: 98.8 °F (37.1 °C); Max - Temp  Av.4 °F (36.9 °C)  Min: 97.2 °F (36.2 °C)  Max: 99.4 °F (37.4 °C)  RESPIRATIONS RANGE: Resp  Av.2  Min: 14  Max: 18  PULSE RANGE: Pulse  Av.2  Min: 76  Max: 122  BLOOD PRESSURE RANGE:  Systolic (86EMM), PSF:135 , Min:94 , NI   ; Diastolic (81RZK), NNC:98, Min:42, Max:70    PULSE OXIMETRY RANGE: SpO2  Av %  Min: 87 %  Max: 94 %  24HR INTAKE/OUTPUT:      Intake/Output Summary (Last 24 hours) at 2019 0710  Last data filed at 2019 0321  Gross per 24 hour   Intake 120 ml   Output 800 ml   Net -680 ml       Exam:    Gen: No distress. Eyes: PERRL. No sclera icterus. No conjunctival injection. ENT: No discharge. Pharynx clear. External appearance of ears and nose normal.  Neck: Trachea midline. No obvious mass.     Resp: No the last 72 hours. AFB:No results for input(s): AFBSMEAR in the last 72 hours. Urine Culture  No results for input(s): LABURIN in the last 72 hours. RADIOLOGY:    XR HIP RIGHT (2-3 VIEWS)   Final Result   Status post ORIF proximal right femur fracture. Correlate with procedural   report. FLUORO FOR SURGICAL PROCEDURES   Final Result      XR CHEST PORTABLE   Final Result   Unchanged left-sided rib fractures. No underlying pneumothorax or pleural   effusion. Suspect lower right-sided rib fractures, age indeterminate. XR HIP RIGHT (2-3 VIEWS)   Final Result   Proximal right femoral shaft fracture with suspected involvement of the   lesser trochanter. XR FEMUR RIGHT (MIN 2 VIEWS)   Final Result   Proximal right femur fracture with involvement of the lesser trochanter. XR SHOULDER RIGHT (MIN 2 VIEWS)   Final Result   Nondisplaced proximal right humeral fracture. CT HEAD WO CONTRAST   Final Result   No acute intracranial abnormality. Areas of encephalomalacia in the inferior right frontal lobe and anterior   right temporal lobe are compatible with sequela of prior trauma. Moderate microvascular ischemic disease. XR CHEST 1 VW   Final Result   Minimally displaced left 7th and 8th rib fractures. No underlying   pneumothorax or pleural effusion. Diffuse small airway inflammation may be seen with asthma, bronchitis or   smoking. No consolidative airspace disease.              CONSULTS:    IP CONSULT TO ORTHOPEDIC SURGERY  IP CONSULT TO HOSPITALIST  IP CONSULT TO SOCIAL WORK  IP CONSULT TO CARDIOLOGY  IP CONSULT TO PULMONOLOGY    ASSESSMENT AND PLAN:      Principal Problem:    Right femoral fracture ([address]  Active Problems:    Fall    Right humeral fracture    Alcohol intoxication ([address]    Hypomagnesemia    Epilepsia ([address]    Closed fracture of neck of right femur ([address]    Alcohol dependence ([address]    Multiple rib fractures    Syncope    Shortness of breath    Bronchitis    Tobacco abuse    ETOH abuse  Resolved Problems:    * No resolved hospital problems. *    Confused, delirious- treat as DT  -  sec to alcohol withdrawal  - start CIWA  -scheduled librium     Right femoral fracture shaft and subtrochanteric  - ortho consulted  - s/p ORIF on 4/16/19     Acute blood loss anemia  - monitor   - sec to surgery     Leucocytosis  - ? Reactive   - monitor     Humeral fracture non displaced    pain management, per orthopedic. R rib fracture  - pain control  -incentive spirometry     Alcohol dependence with abuse  - CIWA   - banana bag      History of epilepsy.  -ct home meds     Hypomagnesemia, replaced. Cough with yellow sputum- treat as bronchitis/ aspiration pneumonia    - duo neb  -ssi  -add unasyn to cover aspiration   -procal elevated         DVT Prophylaxis: lovenox  Diet: Dietary Nutrition Supplements: Standard High Calorie Oral Supplement  DIET GENERAL;  Code Status: Full Code    PT/OT Eval Status:post surgery    Discharge plan - pending recovery from withdrawal     The patient and / or the family were informed of the results of any tests, a time was given to answer questions, a plan was proposed and they agreed with plan. Discussed with consulting physicians, nursing and social work     The note was completed using EMR. Every effort was made to ensure accuracy; however, inadvertent computerized transcription errors may be present.        Tennille Gonsalez MD

## 2019-04-18 NOTE — PROGRESS NOTES
Patient eating his dinner tray. Complaining of hip pain of 8-10. 100 ML/HR 0.9 percent sodium chloride infusion cont. Tele cam in place. [place] in place. Siezure precautions in place. Will continue to monitor and assess. Call light within reach. Patient alert to self and place. Patient verablizes understanding not to get out of bed with calling out. Bed alarm on .

## 2019-04-18 NOTE — CARE COORDINATION
Sharda received calls back from snfs:  [facility] and [facility]-- no  [address] and [address] [facility] --reviewing patient  Jasson Beltran accept  Calls out to [facility] spoke with patient's brother, Seda Cunningham and Bairon's spouse present at hospital ([phone]), regarding above. Patient sleeping. They asked why a facility might not take the patient. Sw explained that the facility may not have a bed or may not feel they can provide for patient's care needs. Sw also explained medicare coverage guidelines. Seda Cunningham will update his brother, Jennifer Bedolla, regarding snfs. Sharda presented Bairon with IMM letter.      Electronically signed by Taniya Anderson on 4/18/2019 at 2:51 PM

## 2019-04-18 NOTE — PLAN OF CARE
Fall risk assessment completed. Fall precautions in place. Call light within reach. Pt educated on calling for assistance before getting up. Walkway free of clutter. Will continue to monitor. Will monitor skin and mucous members. Will turn patient every 2 hours, monitor for friction and sheering, and change dressings as needed. Will preform skin assessment every shift. Fluids offered thru ought the shift. Patient has had a 350 ml urine output via burnette this far.

## 2019-04-18 NOTE — PROGRESS NOTES
Patient scored an 14 on the ciwa-ar score. Prn pain medication was administered. One hour after the medication patient scored a 3 on the ciwa-ar score. Patient complaining of right shoulder pain. See mar for prn medication as ordered. Seizure precautions in place. Patient denied oral meds this shift. Patient continues to take off telemonitor leads after being instructed to keep them. Tele cam in place. Will continue to monitor and assess.

## 2019-04-18 NOTE — PLAN OF CARE
Problem: Pain:  Goal: Pain level will decrease  Description  Pain level will decrease  4/18/2019 0726 by Iona Tan RN  Outcome: Ongoing  Note:   Pain level will decrease. Electronically signed by Iona Tan RN on 4/18/2019 at 7:26 AM    4/18/2019 0514 by Virginia Joshi RN  Outcome: Ongoing  4/18/2019 0135 by Virginia Joshi RN  Outcome: Ongoing  Goal: Control of acute pain  Description  Control of acute pain  4/18/2019 0726 by Iona Tan RN  Outcome: Ongoing  Note:   Control of acute pain. Electronically signed by Ioan Tan RN on 4/18/2019 at 7:27 AM    4/18/2019 0514 by Virginia Joshi RN  Outcome: Ongoing  4/18/2019 0135 by Virginia Joshi RN  Outcome: Ongoing  Goal: Control of chronic pain  Description  Control of chronic pain  4/18/2019 0726 by Iona Tan RN  Outcome: Ongoing  Note:   Control of chronic pain. Electronically signed by Iona Tan RN on 4/18/2019 at 7:27 AM    4/18/2019 0514 by Virginia Josih RN  Outcome: Ongoing  4/18/2019 0135 by Virginia Joshi RN  Outcome: Ongoing  Goal: Patient's pain/discomfort is manageable  Description  Patient's pain/discomfort is manageable  4/18/2019 0726 by Iona Tan RN  Outcome: Ongoing  Note:   Patients pain/discomfort is manageable. Electronically signed by Iona Tan RN on 4/18/2019 at 7:27 AM    4/18/2019 0514 by Virginia Joshi RN  Outcome: Ongoing  4/18/2019 0135 by Virginia Joshi RN  Outcome: Ongoing     Problem: Discharge Planning:  Goal: Discharged to appropriate level of care  Description  Discharged to appropriate level of care  4/18/2019 0726 by Iona Tan RN  Outcome: Ongoing  Note:   Discharged to appropriate level of care.   Electronically signed by Iona Tan RN on 4/18/2019 at 7:27 AM    4/18/2019 0514 by Virginia Joshi RN  Outcome: Ongoing  4/18/2019 0135 by Virginia Joshi RN  Outcome: Ongoing     Problem: Fluid Volume - Deficit:  Goal: Absence of fluid volume deficit signs and symptoms  Description  Absence of fluid volume deficit signs and symptoms  4/18/2019 0726 by Aisha Ray RN  Outcome: Ongoing  Note:   Absence of fluid volume deficit signs/symptoms. Electronically signed by Aisha Ray RN on 4/18/2019 at 7:28 AM    4/18/2019 0514 by Giancarlo Lange RN  Outcome: Ongoing  4/18/2019 0135 by Giancarlo Lange RN  Outcome: Ongoing     Problem: Nutrition Deficit:  Goal: Ability to achieve adequate nutritional intake will improve  Description  Ability to achieve adequate nutritional intake will improve  4/18/2019 0726 by Aisha Ray RN  Outcome: Ongoing  Note:   Ability to achieve adequate nutritional intake will improve. Electronically signed by Aisha Ray RN on 4/18/2019 at 7:28 AM    4/18/2019 0514 by Giancarlo Lange RN  Outcome: Ongoing  4/18/2019 0135 by Giancarlo Lange RN  Outcome: Ongoing     Problem: Sleep Pattern Disturbance:  Goal: Appears well-rested  Description  Appears well-rested  4/18/2019 0726 by Aisha Ray RN  Outcome: Ongoing  Note:   Appears well rested. Electronically signed by Aisha Ray RN on 4/18/2019 at 7:28 AM    4/18/2019 0514 by Giancarlo Lange RN  Outcome: Ongoing  4/18/2019 0135 by Giancarlo Lange RN  Outcome: Ongoing     Problem: Violence - Risk of, Self/Other-Directed:  Goal: Knowledge of developmental care interventions  Description  Absence of violence  4/18/2019 0726 by Aisha Ray RN  Outcome: Ongoing  Note:   Knowledge of developmental care interventions. Electronically signed by Aisha Ray RN on 4/18/2019 at 7:29 AM    4/18/2019 0514 by Giancarlo Lange RN  Outcome: Ongoing  4/18/2019 0135 by Giancarlo Lange RN  Outcome: Ongoing     Problem: Safety:  Goal: Free from accidental physical injury  Description  Free from accidental physical injury  4/18/2019 0726 by Aisha Ray RN  Outcome: Ongoing  Note:   Free from accidental physical injury.   Electronically signed by Aisha Ray RN on 4/18/2019 at 7:29 AM    4/18/2019 0514 by Rosa Rhodes RN  Outcome: Ongoing  4/18/2019 0135 by Rosa Rhodes RN  Outcome: Ongoing  Goal: Free from intentional harm  Description  Free from intentional harm  4/18/2019 0726 by Nava Johnson RN  Outcome: Ongoing  Note:   Free from intentional harm. Electronically signed by Nava Johnson RN on 4/18/2019 at 7:29 AM    4/18/2019 0514 by Rosa Rhodes RN  Outcome: Ongoing  4/18/2019 0135 by Rosa Rhodes RN  Outcome: Ongoing     Problem: Daily Care:  Goal: Daily care needs are met  Description  Daily care needs are met  4/18/2019 0726 by Nava Johnson RN  Outcome: Ongoing  Note:   Daily care needs are met. Electronically signed by Nava Johnson RN on 4/18/2019 at 7:30 AM    4/18/2019 0514 by Rosa Rhodes RN  Outcome: Ongoing  4/18/2019 0135 by Rosa Rhodes RN  Outcome: Ongoing     Problem: Skin Integrity:  Goal: Skin integrity will stabilize  Description  Skin integrity will stabilize  4/18/2019 0726 by Nava Johnson RN  Outcome: Ongoing  Note:   Skin integrity will stabilize. Electronically signed by Nava Johnson RN on 4/18/2019 at 7:30 AM    4/18/2019 0514 by Rosa Rhodes RN  Outcome: Ongoing  4/18/2019 0135 by Rosa Rhodes RN  Outcome: Ongoing     Problem: Discharge Planning:  Goal: Patients continuum of care needs are met  Description  Patients continuum of care needs are met  4/18/2019 0726 by Nava Johnson RN  Outcome: Ongoing  Note:   Patients continuum of care needs are met. Electronically signed by Nava Johnson RN on 4/18/2019 at 7:30 AM    4/18/2019 0514 by Rosa Rhodes RN  Outcome: Ongoing  4/18/2019 0135 by Rosa Rhodes RN  Outcome: Ongoing     Problem: Risk for Impaired Skin Integrity  Goal: Tissue integrity - skin and mucous membranes  Description  Structural intactness and normal physiological function of skin and  mucous membranes.   4/18/2019 0726 by Nava Johnson RN  Outcome: Ongoing  Note:   Structural intactness of skin and mucous membranes. Electronically signed by Nemo Childers RN on 4/18/2019 at 7:30 AM    4/18/2019 0514 by Viktor Brown RN  Outcome: Ongoing  4/18/2019 0135 by Viktor Brown RN  Outcome: Ongoing     Problem: Falls - Risk of:  Goal: Will remain free from falls  Description  Will remain free from falls  4/18/2019 0726 by Nemo Childers RN  Outcome: Ongoing  Note:   Will remain free from falls. Electronically signed by Nemo Childers RN on 4/18/2019 at 7:31 AM    4/18/2019 0514 by Viktor Brown RN  Outcome: Ongoing  4/18/2019 0135 by Viktor Brown RN  Outcome: Ongoing  Goal: Absence of physical injury  Description  Absence of physical injury  4/18/2019 0726 by Nemo Childers RN  Outcome: Ongoing  Note:   Absence of physical injury.   Electronically signed by Nemo Childers RN on 4/18/2019 at 7:31 AM    4/18/2019 0514 by Viktor Brown RN  Outcome: Ongoing  4/18/2019 0135 by Viktor Brown RN  Outcome: Ongoing

## 2019-04-18 NOTE — PROGRESS NOTES
for ADL needs at this time. Pt is unsafe to return home at this time and would benefit from continued therapy to increase mobility and independence. Treatment Diagnosis: impaired func mob, transfers, and ADL status   Prognosis: Fair  Decision Making: Medium Complexity  History: PMH: BPH, seizures   Exam: ADLs, transfers, bed mob  Assistance / Modification: max Ax2 for mobility, max A for ADLs  Patient Education: Role of OT, POC, safety   REQUIRES OT FOLLOW UP: Yes  Activity Tolerance  Activity Tolerance: Treatment limited secondary to decreased cognition;Patient limited by pain  Safety Devices  Safety Devices in place: Yes(RN Tatyana Acuña) notified)  Type of devices: Call light within reach; Left in chair;Gait belt;Nurse notified; Chair alarm in place           Patient Diagnosis(es): The primary encounter diagnosis was Closed displaced fracture of lesser trochanter of right femur, initial encounter ([facility] [address] Diagnoses of Closed fracture of proximal end of right humerus, unspecified fracture morphology, initial encounter, Fall from slip, trip, or stumble, initial encounter, and Closed fracture of multiple ribs of left side, initial encounter were also pertinent to this visit. has a past medical history of BPH (benign prostatic hyperplasia) and Seizures ([facility] [address] has a past surgical history that includes Femur fracture surgery (Right, 4/16/2019).     Treatment Diagnosis: impaired func mob, transfers, and ADL status       Restrictions  Restrictions/Precautions  Restrictions/Precautions: Fall Risk  Position Activity Restriction  Other position/activity restrictions: WBAT LEs    NWB right UE    Subjective   General  Chart Reviewed: Yes  Patient assessed for rehabilitation services?: Yes  Additional Pertinent Hx: Per Angel Torres MD 4/14/19: Pt is \"77 y.o. male who presented to [facility] after he had a mechanical fall, admitted to have right hip pain, 10 out of 10 intensity, nonradiating, sharp, worse with any movement, to note that patient is poor historian and he was intoxicated on presentation. Found to have right femoral and right humeral fracture along was 2 broken ribs. Hold on the right side, patient denies nausea, vomiting, abdominal pain or chest pain when examined. Denies any hallucination at this time, drinks on a daily basis. \"  Family / Caregiver Present: No  Referring Practitioner: BRANDY Munoz   Diagnosis: R femoral fracture   Subjective  Subjective: Pt met bedside, contineus to be sleepy - not easily awoken and mumbling unintelligble speech throughout session. Pain Assessment  Pain Assessment: FLACC  Pain Level: 0  Patient's Stated Pain Goal: No pain     Social/Functional History  Social/Functional History  Additional Comments: patient not able to provide intake information and no family has been here (chart notes indicate a brother may be here on Friday)       Objective        Orientation  Overall Orientation Status: Impaired  Orientation Level: Disoriented X4     Balance  Sitting Balance: Contact guard assistance  Standing Balance: Maximum assistance(in stedy )  Standing Balance  Time: ~15 seconds   Activity: in silvia stedy   Functional Mobility  Functional Mobility Comments: Pt unable to attempt ambulation at this time d/t decreased alertness and ability to follow cues. Use of silvia stedy at this time. ADL  UE Dressing: (Assist to adjust sling )  Additional Comments: PTA anticipate pt to be independent in self-care however unsure d/t decreased cognition and pt inability to answer questions.       Tone RUE  RUE Tone: Normotonic  Tone LUE  LUE Tone: Normotonic  Coordination  Movements Are Fluid And Coordinated: No  Quality of Movement Other  Comment: RUE in sling - NWBing d/t humerus fx     Bed mobility  Supine to Sit: 2 Person assistance;Maximum assistance  Sit to Supine: Unable to assess(not observed -anticipate max of 2)     Transfers  Sit to stand: 2 Person assistance  Stand to sit: 2 Person assistance  Transfer Comments: Max x2 for sit <> stand from EOB to silvia stedy and sit to recliner chair      Cognition  Overall Cognitive Status: Exceptions  Arousal/Alertness: Delayed responses to stimuli  Following Commands: Inconsistently follows commands; Does not follow commands  Attention Span: Unable to maintain attention  Safety Judgement: Decreased awareness of need for assistance;Decreased awareness of need for safety  Problem Solving: Assistance required to generate solutions;Assistance required to implement solutions  Insights: Decreased awareness of deficits  Initiation: Requires cues for all  Sequencing: Requires cues for all        Sensation  Overall Sensation Status: (not able to assess formally)        LUE AROM (degrees)  LUE AROM : WFL  Left Hand AROM (degrees)  Left Hand AROM: WFL  RUE AROM (degrees)  RUE General AROM: In sling - did not assess  Right Hand AROM (degrees)  Right Hand AROM: WFL     LUE Strength  Gross LUE Strength: WFL  RUE Strength  RUE Strength Comment: Did not assess             Plan   Plan  Times per week: 3-5  Times per day: Daily  Current Treatment Recommendations: Strengthening, Functional Mobility Training, Endurance Training, Balance Training, Safety Education & Training, Equipment Evaluation, Education, & procurement, Patient/Caregiver Education & Training, Self-Care / ADL    AM-PAC Score    Michelle Giang scored a 11/24 on the [facility] ADL Inpatient form. Current research shows that an AM-PAC score of 17 or less is typically not associated with a discharge to the patient's home setting. Based on the patients AM-PAC score and their current ADL deficits, it is recommended that the patient have 3-5 sessions per week of Occupational Therapy at d/c to increase the patients independence.      AM-PAC Inpatient Daily Activity Raw Score: 11  AM-PAC Inpatient ADL T-Scale Score : 29.04  ADL Inpatient CMS 0-100% Score: 70.42  ADL Inpatient CMS G-Code Modifier :

## 2019-04-18 NOTE — PLAN OF CARE
Patient administered prn pain medication as ordered. Will continue to monitor and assess. Patient is on ciwa assessment most recent score was a 3 . Prn mediation was administered as ordered for medication from 11-15. Patient is on seizure precautions. Fluids offered, nutritional needs offered, toile ting needs offered. Will monitor skin and mucous members. Will turn patient every 2 hours, monitor for friction and sheering, and change dressings as needed. Will preform skin assessment every shift.

## 2019-04-18 NOTE — PROGRESS NOTES
Patient refused all oral meds as well as lovenox. Patient accepted I.v. Antibiotics and normal saline flush. Patient ate 75 percent of his dinner tray. Patient denies any pain . Patient is alert to self only. Ciwa score pt scored a 14. Prn medications administered as ordered.

## 2019-04-18 NOTE — PROGRESS NOTES
Patient has telemonitor in place. Sleeve on right arm with I.v. Normal saline locked. Dressing removed and light (Mepalex )  dressing applied. Staples are intact. No bleeding at the sight. Slight swelling. Ice therapy patient refused. Tele camera in room. Patient is on two liters of oxygen. Continued seizure precautions . Checking on patient frequently every 15 minutes. Patient is near the nursing station . Bed alarm on. Will continue to monitor and assess. Patient has been between 106-and lows 120's sinus tachycardia thru ought the shift. Pneumatic compression devices on. ciwa score of 5 at 0443.

## 2019-04-18 NOTE — PROGRESS NOTES
[facility] Orthopedic Surgery   Progress Note      S/P :  SUBJECTIVE  In bed. Arouses to name. Follows simple commands and groans but will not answer questions. \"Will you leave me alone? \"He does not appear in distress but does have audible pulm congestion with breathing    OBJECTIVE              Physical                      VITALS:  BP (!) 142/79   Pulse 134   Temp 97.7 °F (36.5 °C) (Oral)   Resp 16   Ht 5' 9\" (1.753 m)   Wt 168 lb 3.4 oz (76.3 kg)   SpO2 94%   BMI 24.84 kg/m²                     MUSCULOSKELETAL:  right foot NVI. Wiggles toes to command. Pedal pulses are palpable. Right hand NVI , wiggle fingers, radial pulse palpable. No right shoulder deformity noted. Right arm in sling. Pillow between knees. TElesitter on. NEUROLOGIC:                                  Sensory:  Touch:  Right Upper Extremity:  normal  Right Lower Extremity:  normal                                                 Surgical wound appears clean and dry right hip.      Data       CBC:   Lab Results   Component Value Date    WBC 11.5 04/18/2019    RBC 2.31 04/18/2019    HGB 8.1 04/18/2019    HCT 23.9 04/18/2019    .3 04/18/2019    MCH 35.0 04/18/2019    MCHC 33.8 04/18/2019    RDW 13.5 04/18/2019     04/18/2019    MPV 9.8 04/18/2019        WBC:    Lab Results   Component Value Date    WBC 11.5 04/18/2019        Hemoglobin/Hematocrit:    Lab Results   Component Value Date    HGB 8.1 04/18/2019    HCT 23.9 04/18/2019        PT/INR:    Lab Results   Component Value Date    PROTIME 11.6 04/15/2019    INR 1.02 04/15/2019              Current Inpatient Medications             Current Facility-Administered Medications: chlordiazePOXIDE (LIBRIUM) capsule 25 mg, 25 mg, Oral, TID  vitamin B-1 (THIAMINE) tablet 100 mg, 100 mg, Oral, Daily  folic acid (FOLVITE) tablet 1 mg, 1 mg, Oral, Daily  ampicillin-sulbactam (UNASYN) 1.5 g IVPB minibag, 1.5 g, Intravenous, Q6H  sodium chloride flush 0.9 % injection 10 mL, 10 mL, Intravenous, 2 times per day  sodium chloride flush 0.9 % injection 10 mL, 10 mL, Intravenous, PRN  docusate sodium (COLACE) capsule 100 mg, 100 mg, Oral, BID  ondansetron (ZOFRAN) injection 4 mg, 4 mg, Intravenous, Q6H PRN  0.9 % sodium chloride infusion, , Intravenous, Continuous  enoxaparin (LOVENOX) injection 40 mg, 40 mg, Subcutaneous, Nightly  LORazepam (ATIVAN) tablet 1 mg, 1 mg, Oral, Q1H PRN **OR** LORazepam (ATIVAN) injection 1 mg, 1 mg, Intravenous, Q1H PRN **OR** LORazepam (ATIVAN) tablet 2 mg, 2 mg, Oral, Q1H PRN **OR** LORazepam (ATIVAN) injection 2 mg, 2 mg, Intravenous, Q1H PRN **OR** LORazepam (ATIVAN) tablet 3 mg, 3 mg, Oral, Q1H PRN **OR** LORazepam (ATIVAN) injection 3 mg, 3 mg, Intravenous, Q1H PRN **OR** LORazepam (ATIVAN) tablet 4 mg, 4 mg, Oral, Q1H PRN **OR** LORazepam (ATIVAN) injection 4 mg, 4 mg, Intravenous, Q1H PRN  ipratropium-albuterol (DUONEB) nebulizer solution 1 ampule, 1 ampule, Inhalation, Q4H WA  azithromycin (ZITHROMAX) tablet 250 mg, 250 mg, Oral, Daily  famotidine (PEPCID) tablet 20 mg, 20 mg, Oral, BID  phenytoin (DILANTIN) ER capsule 200 mg, 200 mg, Oral, BID  tamsulosin (FLOMAX) capsule 0.4 mg, 0.4 mg, Oral, Daily  magnesium hydroxide (MILK OF MAGNESIA) 400 MG/5ML suspension 30 mL, 30 mL, Oral, Daily PRN  acetaminophen (TYLENOL) tablet 650 mg, 650 mg, Oral, Q4H PRN  cyclobenzaprine (FLEXERIL) tablet 10 mg, 10 mg, Oral, TID PRN  oxyCODONE-acetaminophen (PERCOCET) 5-325 MG per tablet 1 tablet, 1 tablet, Oral, Q4H PRN **OR** oxyCODONE-acetaminophen (PERCOCET) 5-325 MG per tablet 2 tablet, 2 tablet, Oral, Q4H PRN  morphine (PF) injection 2 mg, 2 mg, Intravenous, Q2H PRN **OR** morphine (PF) injection 4 mg, 4 mg, Intravenous, Q2H PRN  gabapentin (NEURONTIN) capsule 300 mg, 300 mg, Oral, TID    ASSESSMENT AND PLAN    Fall   Right hip IT fx, post nailing yesterday, 50% WB  Right prox humerus fx, nonop, NWB  ETOH abuse, on CIWA scale  Spoke to his brother yesterday, he will be in [address] Friday. Plan [place] at [place] if insurance approves  PT OT following  Confusion, on telesitter.            Jeanette Mariscal  4/18/2019  11:59 AM

## 2019-04-18 NOTE — PROGRESS NOTES
Pulmonary Progress Note    CC:  Follow up hypoxia, cough, surgery    Subjective:    Room air  Obtunded from ativan      Intake/Output Summary (Last 24 hours) at 4/18/2019 0715  Last data filed at 4/18/2019 0321  Gross per 24 hour   Intake 120 ml   Output 800 ml   Net -680 ml         PHYSICAL EXAM:  Blood pressure 101/62, pulse 119, temperature 98.2 °F (36.8 °C), temperature source Oral, resp. rate 14, height 5' 9\" (1.753 m), weight 168 lb 3.4 oz (76.3 kg), SpO2 90 %.'  Gen: Chronically ill,obtunded  Eyes: PERRL. No sclera icterus. No conjunctival injection. ENT: No discharge. Pharynx clear. External appearance of ears and nose normal.  Neck: Trachea midline. No obvious mass. Resp: Rhonchi. CV: Regular rate. Regular rhythm. No murmur or rub. GI: Non-tender. Non-distended. No hernia. Skin: Warm, dry, normal texture and turgor. No nodule on exposed extremities. Lymph: No cervical LAD. No supraclavicular LAD. M/S: No cyanosis. No clubbing. No joint deformity.     Neuro: Lethargic, confused, non-focal  Ext:   no edema, arm in sling    Medications:    Scheduled Meds:   thiamine  100 mg Oral Daily    folic acid  1 mg Oral Daily    ampicillin-sulbactam  1.5 g Intravenous Q6H    sodium chloride flush  10 mL Intravenous 2 times per day    docusate sodium  100 mg Oral BID    enoxaparin  40 mg Subcutaneous Nightly    ipratropium-albuterol  1 ampule Inhalation Q4H WA    azithromycin  250 mg Oral Daily    famotidine  20 mg Oral BID    phenytoin  200 mg Oral BID    tamsulosin  0.4 mg Oral Daily    gabapentin  300 mg Oral TID       Continuous Infusions:   sodium chloride 100 mL/hr at 04/18/19 0233       PRN Meds:  sodium chloride flush, ondansetron, LORazepam **OR** LORazepam **OR** LORazepam **OR** LORazepam **OR** LORazepam **OR** LORazepam **OR** LORazepam **OR** LORazepam, magnesium hydroxide, acetaminophen, cyclobenzaprine, oxyCODONE-acetaminophen **OR** oxyCODONE-acetaminophen, morphine **OR** morphine    Labs:  CBC:   Recent Labs     04/17/19  0507 04/18/19  0556   WBC 14.4* 11.5*   HGB 8.5* 8.1*   HCT 24.6* 23.9*   .6* 103.3*    168     BMP:   Recent Labs     04/17/19  0507      K 3.7      CO2 29   BUN 8   CREATININE <0.5*     LIVER PROFILE:   Recent Labs     04/17/19  0507   AST 33   ALT 24   BILITOT 0.7   ALKPHOS 97     PT/INR:   No results for input(s): PROTIME, INR in the last 72 hours. APTT:   No results for input(s): APTT in the last 72 hours. UA:No results for input(s): NITRITE, COLORU, PHUR, LABCAST, WBCUA, RBCUA, MUCUS, TRICHOMONAS, YEAST, BACTERIA, CLARITYU, SPECGRAV, LEUKOCYTESUR, UROBILINOGEN, BILIRUBINUR, BLOODU, GLUCOSEU, AMORPHOUS in the last 72 hours. Invalid input(s): Rolando Arcelia  No results for input(s): PH, PCO2, PO2 in the last 72 hours. Films:  Chest imaging reports were reviewed and imaging was reviewed by me and showed no new films    ABG:  None    Cultures:  None    I reviewed the labs and images listed above    ASSESSMENT:  · Dyspnea  · Possible bronchitis  · Tobacco abuse  · ETOH abuse     PLAN:  · Titrate oxygen for saturations greater than or equal to 92%  · Unasyn and azithromycin.    · Duonebs q 4 hours  · Sputum cultures (if able)  · DVT prophylaxis with lovenox        DO RAE Ceja [facility] Pulmonary

## 2019-04-19 ENCOUNTER — APPOINTMENT (OUTPATIENT)
Dept: GENERAL RADIOLOGY | Age: 64
DRG: 480 | End: 2019-04-19
Payer: MEDICARE

## 2019-04-19 LAB
BASOPHILS ABSOLUTE: 0 K/UL (ref 0–0.2)
BASOPHILS RELATIVE PERCENT: 0.3 %
EOSINOPHILS ABSOLUTE: 0.3 K/UL (ref 0–0.6)
EOSINOPHILS RELATIVE PERCENT: 3 %
HCT VFR BLD CALC: 23.4 % (ref 40.5–52.5)
HEMOGLOBIN: 8 G/DL (ref 13.5–17.5)
LYMPHOCYTES ABSOLUTE: 1.1 K/UL (ref 1–5.1)
LYMPHOCYTES RELATIVE PERCENT: 11.6 %
MCH RBC QN AUTO: 35 PG (ref 26–34)
MCHC RBC AUTO-ENTMCNC: 34 G/DL (ref 31–36)
MCV RBC AUTO: 102.8 FL (ref 80–100)
MONOCYTES ABSOLUTE: 1.4 K/UL (ref 0–1.3)
MONOCYTES RELATIVE PERCENT: 15.4 %
NEUTROPHILS ABSOLUTE: 6.5 K/UL (ref 1.7–7.7)
NEUTROPHILS RELATIVE PERCENT: 69.7 %
PDW BLD-RTO: 13.4 % (ref 12.4–15.4)
PLATELET # BLD: 173 K/UL (ref 135–450)
PMV BLD AUTO: 10 FL (ref 5–10.5)
RBC # BLD: 2.28 M/UL (ref 4.2–5.9)
WBC # BLD: 9.3 K/UL (ref 4–11)

## 2019-04-19 PROCEDURE — 97530 THERAPEUTIC ACTIVITIES: CPT

## 2019-04-19 PROCEDURE — 6360000002 HC RX W HCPCS: Performed by: ORTHOPAEDIC SURGERY

## 2019-04-19 PROCEDURE — 6370000000 HC RX 637 (ALT 250 FOR IP): Performed by: ORTHOPAEDIC SURGERY

## 2019-04-19 PROCEDURE — 85025 COMPLETE CBC W/AUTO DIFF WBC: CPT

## 2019-04-19 PROCEDURE — 2580000003 HC RX 258: Performed by: ORTHOPAEDIC SURGERY

## 2019-04-19 PROCEDURE — 92611 MOTION FLUOROSCOPY/SWALLOW: CPT

## 2019-04-19 PROCEDURE — 94664 DEMO&/EVAL PT USE INHALER: CPT

## 2019-04-19 PROCEDURE — 74230 X-RAY XM SWLNG FUNCJ C+: CPT

## 2019-04-19 PROCEDURE — 1200000000 HC SEMI PRIVATE

## 2019-04-19 PROCEDURE — 92610 EVALUATE SWALLOWING FUNCTION: CPT

## 2019-04-19 PROCEDURE — 36415 COLL VENOUS BLD VENIPUNCTURE: CPT

## 2019-04-19 PROCEDURE — 94640 AIRWAY INHALATION TREATMENT: CPT

## 2019-04-19 PROCEDURE — 94761 N-INVAS EAR/PLS OXIMETRY MLT: CPT

## 2019-04-19 PROCEDURE — 92526 ORAL FUNCTION THERAPY: CPT

## 2019-04-19 PROCEDURE — 6370000000 HC RX 637 (ALT 250 FOR IP): Performed by: INTERNAL MEDICINE

## 2019-04-19 PROCEDURE — 99232 SBSQ HOSP IP/OBS MODERATE 35: CPT | Performed by: INTERNAL MEDICINE

## 2019-04-19 RX ORDER — CHLORDIAZEPOXIDE HYDROCHLORIDE 5 MG/1
10 CAPSULE, GELATIN COATED ORAL 4 TIMES DAILY
Status: DISCONTINUED | OUTPATIENT
Start: 2019-04-19 | End: 2019-04-20

## 2019-04-19 RX ADMIN — IPRATROPIUM BROMIDE AND ALBUTEROL SULFATE 1 AMPULE: .5; 3 SOLUTION RESPIRATORY (INHALATION) at 20:56

## 2019-04-19 RX ADMIN — AZITHROMYCIN 250 MG: 250 TABLET, FILM COATED ORAL at 08:17

## 2019-04-19 RX ADMIN — DOCUSATE SODIUM 100 MG: 100 CAPSULE, LIQUID FILLED ORAL at 08:13

## 2019-04-19 RX ADMIN — CHLORDIAZEPOXIDE HYDROCHLORIDE 10 MG: 5 CAPSULE ORAL at 08:16

## 2019-04-19 RX ADMIN — CHLORDIAZEPOXIDE HYDROCHLORIDE 10 MG: 5 CAPSULE ORAL at 20:27

## 2019-04-19 RX ADMIN — SODIUM CHLORIDE: 9 INJECTION, SOLUTION INTRAVENOUS at 22:10

## 2019-04-19 RX ADMIN — AMPICILLIN SODIUM AND SULBACTAM SODIUM 1.5 G: 1; .5 INJECTION, POWDER, FOR SOLUTION INTRAMUSCULAR; INTRAVENOUS at 02:32

## 2019-04-19 RX ADMIN — PHENYTOIN SODIUM 200 MG: 100 CAPSULE ORAL at 20:27

## 2019-04-19 RX ADMIN — Medication 100 MG: at 08:17

## 2019-04-19 RX ADMIN — FOLIC ACID 1 MG: 1 TABLET ORAL at 08:13

## 2019-04-19 RX ADMIN — AMPICILLIN SODIUM AND SULBACTAM SODIUM 1.5 G: 1; .5 INJECTION, POWDER, FOR SOLUTION INTRAMUSCULAR; INTRAVENOUS at 22:10

## 2019-04-19 RX ADMIN — GABAPENTIN 300 MG: 300 CAPSULE ORAL at 08:17

## 2019-04-19 RX ADMIN — IPRATROPIUM BROMIDE AND ALBUTEROL SULFATE 1 AMPULE: .5; 3 SOLUTION RESPIRATORY (INHALATION) at 11:39

## 2019-04-19 RX ADMIN — IPRATROPIUM BROMIDE AND ALBUTEROL SULFATE 1 AMPULE: .5; 3 SOLUTION RESPIRATORY (INHALATION) at 07:46

## 2019-04-19 RX ADMIN — AMPICILLIN SODIUM AND SULBACTAM SODIUM 1.5 G: 1; .5 INJECTION, POWDER, FOR SOLUTION INTRAMUSCULAR; INTRAVENOUS at 08:18

## 2019-04-19 RX ADMIN — CHLORDIAZEPOXIDE HYDROCHLORIDE 10 MG: 5 CAPSULE ORAL at 14:45

## 2019-04-19 RX ADMIN — GABAPENTIN 300 MG: 300 CAPSULE ORAL at 20:28

## 2019-04-19 RX ADMIN — ENOXAPARIN SODIUM 40 MG: 40 INJECTION SUBCUTANEOUS at 20:28

## 2019-04-19 RX ADMIN — AMPICILLIN SODIUM AND SULBACTAM SODIUM 1.5 G: 1; .5 INJECTION, POWDER, FOR SOLUTION INTRAMUSCULAR; INTRAVENOUS at 14:45

## 2019-04-19 RX ADMIN — FAMOTIDINE 20 MG: 20 TABLET ORAL at 20:27

## 2019-04-19 RX ADMIN — GABAPENTIN 300 MG: 300 CAPSULE ORAL at 14:46

## 2019-04-19 RX ADMIN — TAMSULOSIN HYDROCHLORIDE 0.4 MG: 0.4 CAPSULE ORAL at 08:17

## 2019-04-19 RX ADMIN — FAMOTIDINE 20 MG: 20 TABLET ORAL at 08:13

## 2019-04-19 RX ADMIN — OXYCODONE HYDROCHLORIDE AND ACETAMINOPHEN 2 TABLET: 5; 325 TABLET ORAL at 08:12

## 2019-04-19 RX ADMIN — PHENYTOIN SODIUM 200 MG: 100 CAPSULE ORAL at 08:16

## 2019-04-19 ASSESSMENT — PAIN DESCRIPTION - PROGRESSION: CLINICAL_PROGRESSION: NOT CHANGED

## 2019-04-19 ASSESSMENT — PAIN SCALES - GENERAL
PAINLEVEL_OUTOF10: 10
PAINLEVEL_OUTOF10: 0
PAINLEVEL_OUTOF10: 0

## 2019-04-19 ASSESSMENT — PAIN DESCRIPTION - PAIN TYPE: TYPE: SURGICAL PAIN

## 2019-04-19 ASSESSMENT — PAIN DESCRIPTION - ONSET: ONSET: ON-GOING

## 2019-04-19 ASSESSMENT — PAIN DESCRIPTION - FREQUENCY: FREQUENCY: CONTINUOUS

## 2019-04-19 ASSESSMENT — PAIN - FUNCTIONAL ASSESSMENT: PAIN_FUNCTIONAL_ASSESSMENT: PREVENTS OR INTERFERES SOME ACTIVE ACTIVITIES AND ADLS

## 2019-04-19 ASSESSMENT — PAIN DESCRIPTION - LOCATION: LOCATION: LEG

## 2019-04-19 ASSESSMENT — PAIN DESCRIPTION - DESCRIPTORS: DESCRIPTORS: ACHING;SHARP

## 2019-04-19 ASSESSMENT — PAIN DESCRIPTION - ORIENTATION: ORIENTATION: RIGHT

## 2019-04-19 NOTE — PROGRESS NOTES
Orders: None  · Anthropometric Measures:  · Ht: 5' 9\" (175.3 cm)   · Current Body Wt: 168 lb (76.2 kg)  · % Weight Change:  ,  appears fairly stable per EMR  · Ideal Body Wt: 160 lb (72.6 kg),   · BMI Classification: BMI 18.5 - 24.9 Normal Weight    Nutrition Interventions:   Continue current diet, Continue current ONS  Continued Inpatient Monitoring    Nutrition Evaluation:   · Evaluation: Goals set   · Goals:  Tolerate diet and consume greater than 50% of meals and supplements     · Monitoring: Meal Intake, Supplement Intake, Diet Tolerance, Chewing/Swallowing, Weight, Mental Status/Confusion      Electronically signed by Sylvain Castano RD, LD on 4/19/19 at 11:59 AM    Contact Number: [phone]

## 2019-04-19 NOTE — PROGRESS NOTES
Progress Note  Admit Date: 2019      PCP: Hunter Hicks MD     CC: F/U for fall    SUBJECTIVE / Interval History:  Very sleepy , confused . Better than yesterday   Overnight agitated/delirious       Allergies  Patient has no known allergies. Medications    Scheduled Meds:   chlordiazePOXIDE  10 mg Oral 4x Daily    chlordiazePOXIDE  25 mg Oral TID    thiamine  100 mg Oral Daily    folic acid  1 mg Oral Daily    ampicillin-sulbactam  1.5 g Intravenous Q6H    sodium chloride flush  10 mL Intravenous 2 times per day    docusate sodium  100 mg Oral BID    enoxaparin  40 mg Subcutaneous Nightly    ipratropium-albuterol  1 ampule Inhalation Q4H WA    azithromycin  250 mg Oral Daily    famotidine  20 mg Oral BID    phenytoin  200 mg Oral BID    tamsulosin  0.4 mg Oral Daily    gabapentin  300 mg Oral TID     Continuous Infusions:   sodium chloride 100 mL/hr at 19 0233       PRN Meds:  sodium chloride flush, ondansetron, LORazepam **OR** LORazepam **OR** LORazepam **OR** LORazepam **OR** LORazepam **OR** LORazepam **OR** LORazepam **OR** LORazepam, magnesium hydroxide, acetaminophen, cyclobenzaprine, oxyCODONE-acetaminophen **OR** oxyCODONE-acetaminophen, morphine **OR** morphine    Vitals    TEMPERATURE:  Current - Temp: 97.7 °F (36.5 °C); Max - Temp  Av.1 °F (36.7 °C)  Min: 97.7 °F (36.5 °C)  Max: 98.8 °F (37.1 °C)  RESPIRATIONS RANGE: Resp  Av  Min: 15  Max: 19  PULSE RANGE: Pulse  Av.3  Min: 105  Max: 134  BLOOD PRESSURE RANGE:  Systolic (01NQK), UQD:785 , Min:100 , OKV:903   ; Diastolic (20RZY), JUS:04, Min:62, Max:79    PULSE OXIMETRY RANGE: SpO2  Av.5 %  Min: 87 %  Max: 94 %  24HR INTAKE/OUTPUT:      Intake/Output Summary (Last 24 hours) at 2019 0723  Last data filed at 2019 0523  Gross per 24 hour   Intake 1103.42 ml   Output 700 ml   Net 403.42 ml       Exam:    Gen: No distress. Eyes: PERRL. No sclera icterus. No conjunctival injection.    ENT: No discharge. Pharynx clear. External appearance of ears and nose normal.  Neck: Trachea midline. No obvious mass. Resp: No accessory muscle use. No crackles. No wheezes. Few rhonchi. No dullness on percussion. CV: Regular rate. Regular rhythm. No murmur or rub. No edema. GI: Non-tender. Non-distended. No hernia. Skin: Warm, dry, normal texture and turgor. Rash + L arm. Dressing hip +   Lymph: No cervical LAD. No supraclavicular LAD. M/S: No cyanosis. No clubbing. R arm in sling, leg movement limited    Neuro: Moves all four extremities. CN 2-12 tested, no defect noted. Psych: Oriented x 1. Lethargic +    Data    LABS  CBC:   Recent Labs     04/17/19  0507 04/18/19  0556 04/19/19  0531   WBC 14.4* 11.5* 9.3   HGB 8.5* 8.1* 8.0*   HCT 24.6* 23.9* 23.4*   .6* 103.3* 102.8*    168 173     BMP:   Recent Labs     04/17/19  0507      K 3.7      CO2 29   BUN 8   CREATININE <0.5*     POC GLUCOSE:  No results for input(s): POCGLU in the last 72 hours. LIVER PROFILE:   Recent Labs     04/17/19  0507   AST 33   ALT 24   LABALBU 2.1*   BILITOT 0.7   ALKPHOS 97     PT/INR:   No results for input(s): PROTIME, INR in the last 72 hours. APTT:   No results for input(s): APTT in the last 72 hours. UA:No results for input(s): NITRITE, COLORU, PHUR, LABCAST, WBCUA, RBCUA, MUCUS, TRICHOMONAS, YEAST, BACTERIA, CLARITYU, SPECGRAV, LEUKOCYTESUR, UROBILINOGEN, BILIRUBINUR, BLOODU, GLUCOSEU, KETUA, AMORPHOUS in the last 72 hours. Microbiology:  Wound Culture: No results for input(s): WNDABS, ORG in the last 72 hours. Invalid input(s):  LABGRAM  Nasal Culture: No results for input(s): ORG, MRSAPCR in the last 72 hours. Blood Culture: No results for input(s): BC, BLOODCULT2 in the last 72 hours. Fungal Culture:   No results for input(s): FUNGSM in the last 72 hours. No results for input(s): FUNCXBLD in the last 72 hours.   CSF Culture:  No results for input(s): COLORCSF, APPEARCSF, CFTUBE, CLOTCSF, WBCCSF, RBCCSF, NEUTCSF, NUMCELLSCSF, LYMPHSCSF, MONOCSF, GLUCCSF, VOLCSF in the last 72 hours. Respiratory Culture:  No results for input(s): Elihue Pair in the last 72 hours. AFB:No results for input(s): AFBSMEAR in the last 72 hours. Urine Culture  No results for input(s): LABURIN in the last 72 hours. RADIOLOGY:    XR HIP RIGHT (2-3 VIEWS)   Final Result   Status post ORIF proximal right femur fracture. Correlate with procedural   report. FLUORO FOR SURGICAL PROCEDURES   Final Result      XR CHEST PORTABLE   Final Result   Unchanged left-sided rib fractures. No underlying pneumothorax or pleural   effusion. Suspect lower right-sided rib fractures, age indeterminate. XR HIP RIGHT (2-3 VIEWS)   Final Result   Proximal right femoral shaft fracture with suspected involvement of the   lesser trochanter. XR FEMUR RIGHT (MIN 2 VIEWS)   Final Result   Proximal right femur fracture with involvement of the lesser trochanter. XR SHOULDER RIGHT (MIN 2 VIEWS)   Final Result   Nondisplaced proximal right humeral fracture. CT HEAD WO CONTRAST   Final Result   No acute intracranial abnormality. Areas of encephalomalacia in the inferior right frontal lobe and anterior   right temporal lobe are compatible with sequela of prior trauma. Moderate microvascular ischemic disease. XR CHEST 1 VW   Final Result   Minimally displaced left 7th and 8th rib fractures. No underlying   pneumothorax or pleural effusion. Diffuse small airway inflammation may be seen with asthma, bronchitis or   smoking. No consolidative airspace disease.              CONSULTS:    IP CONSULT TO ORTHOPEDIC SURGERY  IP CONSULT TO HOSPITALIST  IP CONSULT TO SOCIAL WORK  IP CONSULT TO CARDIOLOGY  IP CONSULT TO PULMONOLOGY    ASSESSMENT AND PLAN:      Principal Problem:    Right femoral fracture ([facility] [address]  Active Problems:    Fall    Right humeral fracture    Alcohol intoxication ([facility] [address] Hypomagnesemia    Epilepsia ([facility] [address]    Closed fracture of neck of right femur ([facility] [address]    Alcohol dependence ([facility] [address]    Multiple rib fractures    Syncope    Shortness of breath    Bronchitis    Tobacco abuse    ETOH abuse  Resolved Problems:    * No resolved hospital problems. *    70 y.o. male with a past medical history of alcohol dependence and withdrawal seizures  who presented to [facility] after he had a mechanical fall. Patient was intoxicated on admission. He was admitted with a right femoral shaft fracture along with humerus fracture and rib fracture. Patient underwent ORIF on 4/16/2019. Hospital course was complicated by delirium tremens and acute blood loss anemia along with bronchitis and possible aspiration pneumonia. Confused, delirious- treat as DT- slightly better   -  sec to alcohol withdrawal  - start CIWA  -scheduled librium , dose decreased    Right femoral fracture shaft and subtrochanteric  - ortho consulted  - s/p ORIF on 4/16/19     Acute blood loss anemia  - monitor   - sec to surgery     Leucocytosis  - ? Reactive   - monitor     Humeral fracture non displaced    pain management, per orthopedic. R rib fracture  - pain control  -incentive spirometry     Alcohol dependence with abuse  - CIWA   - banana bag      History of epilepsy.  -ct home meds     Hypomagnesemia, replaced. Cough with yellow sputum- treat as bronchitis/ aspiration pneumonia    - duo neb  -ssi  -add unasyn to cover aspiration   -procal elevated         DVT Prophylaxis: lovenox  Diet: Dietary Nutrition Supplements: Standard High Calorie Oral Supplement  DIET GENERAL;  Code Status: Full Code    PT/OT Eval Status:post surgery    Discharge plan - pending recovery from withdrawal ,hopefully tomorrow    The patient and / or the family were informed of the results of any tests, a time was given to answer questions, a plan was proposed and they agreed with plan.     Discussed with consulting physicians, nursing and social work The note was completed using EMR. Every effort was made to ensure accuracy; however, inadvertent computerized transcription errors may be present.        Cornelius Chilel MD

## 2019-04-19 NOTE — CARE COORDINATION
Sw received call back from [facility] AT [facility] accept patient. Sw spoke with patient's brother, Rosette Bangura and Bairon's wife via phone, regarding above. Sw reviewed other possible options of [address] [place] [address] and [facility]. They reported speaking with the other brother Jigar Levy and his spouse and have agreed that they prefer [facility] at this time. Sharda called Eva at [facility] and left a message.      Electronically signed by Indra Gutierrez   on 4/19/2019 at 4:46 PM

## 2019-04-19 NOTE — PROGRESS NOTES
In to assess pt. A&Ox4, is very alert this morning, states he feels ashamed of himself because he can only imagine how he's acted the past couple days. VSS, CIWA score 1. Does not appear to be in any distress. RUE remains in sling, normal radial pulse, brisk cap refill, normal sensation. RLE dressings remain D&I. Normal sensation, brisk cap refill, normal distal pulses. IV site WDL. Was able to swallow pills whole this morning with no issue. Call light within reach, will continue to monitor.   Electronically signed by Marco Ureña RN on 4/19/2019 at 8:03 AM

## 2019-04-19 NOTE — PLAN OF CARE
well-rested  Description  Appears well-rested  Outcome: Ongoing  Note:   Patient appears well rested today is much more alert and oriented than he has been since admission. Problem: Violence - Risk of, Self/Other-Directed:  Goal: Knowledge of developmental care interventions  Description  Absence of violence  Outcome: Ongoing     Problem: Safety:  Goal: Free from accidental physical injury  Description  Free from accidental physical injury  4/19/2019 1128 by Fide Spain RN  Outcome: Ongoing  Note:   Bed in lowest position, wheels locked, bed/chair exit alarm in place, call light within reach, and non skid footwear on. Walkway free of clutter. Pt alert and oriented and able to make needs known. Pt educated to use call light when needing to get up, and pt utilizes call light to make needs known. Tle cam remains in place. Will continue to monitor. 4/18/2019 2348 by Ron Thomas RN  Outcome: Ongoing  Note:   Pt is free of injury. No injury noted. Fall precautions in place. Call light within reach. Will monitor. \    Goal: Free from intentional harm  Description  Free from intentional harm  Outcome: Ongoing     Problem: Daily Care:  Goal: Daily care needs are met  Description  Daily care needs are met  Outcome: Ongoing  Note:   Patient assessed to determine ability to perform daily needs and ADLs. Patient assisted with any daily needs that were not able to be met individually by patient. [place] encouraged, although patient educated to ask for assistance when needed. Will continue to monitor and assist patient with meeting daily care needs as needed. Problem: Skin Integrity:  Goal: Skin integrity will stabilize  Description  Skin integrity will stabilize  4/19/2019 1128 by Fide Spain RN  Outcome: Ongoing  Note:   Will monitor skin and mucous members. Will turn patient every 2 hours, monitor for friction and sheering, and change dressings as needed. Will preform skin assessment every shift. 4/18/2019 2348 by Trinity Hardy RN  Outcome: Ongoing  Note:   Skin assessment completed every shift. Pt assessed for incontinence, appropriate barrier cream applied prn. Pt encouraged to turn/rotate every 2 hours. Assistance provided if pt unable to do so themselves. Problem: Discharge Planning:  Goal: Patients continuum of care needs are met  Description  Patients continuum of care needs are met  Outcome: Ongoing     Problem: Risk for Impaired Skin Integrity  Goal: Tissue integrity - skin and mucous membranes  Description  Structural intactness and normal physiological function of skin and  mucous membranes. Outcome: Ongoing  Note:   Assessment of surgical site complete. No signs of redness, warmth or swelling noted. Afebrile. Education provided on signs and symptoms of surgical site infections. Problem: Falls - Risk of:  Goal: Will remain free from falls  Description  Will remain free from falls  4/19/2019 1128 by Deb Macario RN  Outcome: Ongoing  Note:   Fall risk assessment completed. Fall precautions in place. Bed in lowest position, wheels locked, bed/chair exit alarm in place, call light within reach, and non skid footwear on. Walkway free of clutter. Pt alert and oriented and able to make needs known. Pt educated to use call light when needing to get up, and pt utilizes call light to make needs known. Will continue to monitor. 4/18/2019 2348 by Trinity Hardy RN  Outcome: Ongoing  Note:   Fall risk assessment completed. Fall precautions in place. Call light within reach. Pt educated on calling for assistance before getting up. Walkway free of clutter. Will continue to monitor.      Goal: Absence of physical injury  Description  Absence of physical injury  Outcome: Ongoing  Electronically signed by Deb Macario RN on 4/19/2019 at 11:36 AM

## 2019-04-19 NOTE — PROGRESS NOTES
Pt awake in bed off and on. Pt yelling out into the crandall for someone to \"get him out of here\"  RN re-oriented patient on his surroundings and what was going on. Pt alert to self only. Keeps saying he needs help getting out of the bed and doesn't remember hurting his leg or arm. Pt has removed telemetry leads multiple times and been educated on the importance of keeping them on. Seizure precautions in place, tele-cam in place, bed alarm engaged, Will continue to monitor.

## 2019-04-19 NOTE — PROGRESS NOTES
Pulmonary Progress Note    CC:  Follow up hypoxia, cough, surgery    Subjective:    Room air  Less congestion  Less coughing    Intake/Output Summary (Last 24 hours) at 4/19/2019 0728  Last data filed at 4/19/2019 0523  Gross per 24 hour   Intake 1103.42 ml   Output 700 ml   Net 403.42 ml         PHYSICAL EXAM:  Blood pressure 114/70, pulse 114, temperature 97.7 °F (36.5 °C), temperature source Oral, resp. rate 15, height 5' 9\" (1.753 m), weight 168 lb 3.4 oz (76.3 kg), SpO2 90 %.'  Gen: Chronically ill, more awake  Eyes: PERRL. No sclera icterus. No conjunctival injection. ENT: No discharge. Pharynx clear. External appearance of ears and nose normal.  Neck: Trachea midline. No obvious mass. Resp: Rhonchi more so on the right  CV: Regular rate. Regular rhythm. No murmur or rub. GI: Non-tender. Non-distended. No hernia. Skin: Warm, dry, normal texture and turgor. No nodule on exposed extremities. Lymph: No cervical LAD. No supraclavicular LAD. M/S: No cyanosis. No clubbing. No joint deformity.     Neuro: more awake  Ext:   no edema, arm in sling    Medications:    Scheduled Meds:   chlordiazePOXIDE  10 mg Oral 4x Daily    chlordiazePOXIDE  25 mg Oral TID    thiamine  100 mg Oral Daily    folic acid  1 mg Oral Daily    ampicillin-sulbactam  1.5 g Intravenous Q6H    sodium chloride flush  10 mL Intravenous 2 times per day    docusate sodium  100 mg Oral BID    enoxaparin  40 mg Subcutaneous Nightly    ipratropium-albuterol  1 ampule Inhalation Q4H WA    azithromycin  250 mg Oral Daily    famotidine  20 mg Oral BID    phenytoin  200 mg Oral BID    tamsulosin  0.4 mg Oral Daily    gabapentin  300 mg Oral TID       Continuous Infusions:   sodium chloride 100 mL/hr at 04/18/19 0233       PRN Meds:  sodium chloride flush, ondansetron, LORazepam **OR** LORazepam **OR** LORazepam **OR** LORazepam **OR** LORazepam **OR** LORazepam **OR** LORazepam **OR** LORazepam, magnesium hydroxide, acetaminophen, cyclobenzaprine, oxyCODONE-acetaminophen **OR** oxyCODONE-acetaminophen, morphine **OR** morphine    Labs:  CBC:   Recent Labs     04/17/19  0507 04/18/19  0556 04/19/19  0531   WBC 14.4* 11.5* 9.3   HGB 8.5* 8.1* 8.0*   HCT 24.6* 23.9* 23.4*   .6* 103.3* 102.8*    168 173     BMP:   Recent Labs     04/17/19  0507      K 3.7      CO2 29   BUN 8   CREATININE <0.5*     LIVER PROFILE:   Recent Labs     04/17/19  0507   AST 33   ALT 24   BILITOT 0.7   ALKPHOS 97     PT/INR:   No results for input(s): PROTIME, INR in the last 72 hours. APTT:   No results for input(s): APTT in the last 72 hours. UA:No results for input(s): NITRITE, COLORU, PHUR, LABCAST, WBCUA, RBCUA, MUCUS, TRICHOMONAS, YEAST, BACTERIA, CLARITYU, SPECGRAV, LEUKOCYTESUR, UROBILINOGEN, BILIRUBINUR, BLOODU, GLUCOSEU, AMORPHOUS in the last 72 hours. Invalid input(s): Sushma Zapatamiguel a  No results for input(s): PH, PCO2, PO2 in the last 72 hours. Films:  Chest imaging reports were reviewed and imaging was reviewed by me and showed no new films    ABG:  None    Cultures:  None    I reviewed the labs and images listed above    ASSESSMENT:  · Dyspnea  · Possible bronchitis  · Tobacco abuse  · ETOH abuse     PLAN:  · Titrate oxygen for saturations greater than or equal to 92%  · Unasyn and azithromycin.    Finish 7 days total.  Can transition to oral if able to swallow  · Duonebs q 4 hours  · Sputum cultures (if able)  · DVT prophylaxis with lovenox    Will sign off    DO RAE Stark [facility] Pulmonary

## 2019-04-19 NOTE — PROGRESS NOTES
Physical Therapy    Facility/Department: [address] ORTHOPEDICS    Treatment note    NAME: Bobbi Kimball  : 1955  MRN: [id number]    Date of Service: 2019    Discharge Recommendations:  Remains:  Bobbi Kimball scored a  on the AM-PAC short mobility form. Current research shows that an AM-PAC score of 17 or less is typically not associated with a discharge to the patient's home setting. Based on the patients AM-PAC score and their current functional mobility deficits, it is recommended that the patient have 3-5 sessions per week of Physical Therapy at d/c to increase the patients independence. Assessment   Body structures, Functions, Activity limitations: Decreased functional mobility ; Decreased ADL status; Decreased safe awareness;Decreased ROM; Decreased balance;Decreased cognition  Prognosis: Fair  Activity Tolerance  Activity Tolerance: Patient limited by cognitive status; Patient limited by endurance; Patient limited by fatigue       Patient Diagnosis(es): The primary encounter diagnosis was Closed displaced fracture of lesser trochanter of right femur, initial encounter ([facility] [address] Diagnoses of Closed fracture of proximal end of right humerus, unspecified fracture morphology, initial encounter, Fall from slip, trip, or stumble, initial encounter, and Closed fracture of multiple ribs of left side, initial encounter were also pertinent to this visit. has a past medical history of BPH (benign prostatic hyperplasia) and Seizures ([address] has a past surgical history that includes Femur fracture surgery (Right, 2019). Restrictions  Restrictions/Precautions  Restrictions/Precautions: Fall Risk  Position Activity Restriction  Other position/activity restrictions: WBAT LEs   --- NWB right UE  Vision/Hearing  Vision: (wears glasses)  Vision Exceptions: Wears glasses at all times  Hearing: Within functional limits     Subjective  General  Chart Reviewed:  Yes  Additional Pertinent Hx: here due to fall with right hip and right proximal humerus fractures in context of EtOH abuse history  Response To Previous Treatment: Patient unable to report, no changes reported from family or staff  Family / Caregiver Present: No  Follows Commands: Impaired  Subjective  Subjective: alert this morning- asking where his wallet is- found in bag along with his clothes - he looked through the wallet with OT and PT in observation and then replaced to the bag and returned to the closet (nursing informed also -Brenda)  Pain Screening  Patient Currently in Pain: (offers no pain complaints- winces with pain of right hip with movement in bed and to stance in the silvia stedy)  Social/Functional History  Social/Functional History  Additional Comments: patient not able to provide intake information and no family has been here (chart notes indicate a brother may be here on Friday)  Objective  Bed mobility  Supine to Sit: 2 Person assistance;Maximum assistance  Sit to Supine: Maximum assistance;2 Person assistance  Transfers  Sit to Stand: 2 Person Assistance;Maximum Assistance(silvia stedy)  Stand to sit: Maximum Assistance;2 Person Assistance(silvia stedy)  Ambulation  Ambulation?: No  Stairs/Curb  Stairs?: No  Balance  Comments: near midline in rather slumped posture with close SBA for safety     -not able to gain a stable static stance in midline in the silvia stedy with hold of left hand to the crossbar  Plan   Plan  Times per week: 3-5 while on acute floor  Current Treatment Recommendations: Functional Mobility Training, Transfer Training, Positioning, ADL/Self-care Training  Safety Devices  Type of devices:  All fall risk precautions in place, Left in chair, Call light within reach, Nurse notified, Chair alarm in place(Brenda)    AM-PAC Score  AM-PAC Inpatient Mobility Raw Score : 7  AM-PAC Inpatient T-Scale Score : 26.42  Mobility Inpatient CMS 0-100% Score: 92.36  Mobility Inpatient CMS G-Code Modifier : CM    Goals  Short term

## 2019-04-19 NOTE — PROGRESS NOTES
Pt struggled to swallow night time medication. RN was able to administer some night time medications but patient had a very hard time swallowing them. Swallow eval recommended. Pan Murrell

## 2019-04-19 NOTE — PLAN OF CARE
Problem: Nutrition  Goal: Optimal nutrition therapy  Outcome: Ongoing     Nutrition Problem: Inadequate oral intake  Intervention: Food and/or Nutrient Delivery: Continue current diet, Continue current ONS  Nutritional Goals:  Tolerate diet and consume greater than 50% of meals and supplements

## 2019-04-19 NOTE — PROGRESS NOTES
Speech Language Pathology  Facility/Department: [facility] 3W ORTHOPEDICS   BEDSIDE SWALLOW EVALUATION    NAME: Geneva Martin  : 1955  MRN: [id number]    ADMISSION DATE: 2019  ADMITTING DIAGNOSIS: has Fall; Right humeral fracture; Right femoral fracture ([address] Alcohol intoxication ([facility] [address] Hypomagnesemia; Epilepsia ([facility] [address] Closed fracture of neck of right femur ([facility] [address] Alcohol dependence ([facility] [address] Multiple rib fractures; Syncope; Shortness of breath; Bronchitis; Tobacco abuse; and ETOH abuse on their problem list.  ONSET DATE: 19    History Of Present Illness:       70 y.o. male who presented to [facility] after he had a mechanical fall, admitted to have right hip pain, 10 out of 10 intensity, nonradiating, sharp, worse with any movement, to note that patient is poor historian and he was intoxicated on presentation. Found to have right femoral and right humeral fracture along was 2 broken ribs. Hold on the right side, patient denies nausea, vomiting, abdominal pain or chest pain when examined. Denies any hallucination at this time, drinks on a daily basis.           Recent Chest Xray/CT of Chest: (19 ): Impression   Unchanged left-sided rib fractures.  No underlying pneumothorax or pleural   effusion.       Suspect lower right-sided rib fractures, age indeterminate. Date of Eval: 2019  Evaluating Therapist: Remi Pappas    Current Diet level:  Current Diet : Regular  Current Liquid Diet : Thin      Primary Complaint  Patient Complaint: Per RN, inconsistent cough noted with med administration. Pt with new onset of PNA.     Pain:  Pain Assessment  Pain Assessment: 0-10  Pain Level: 0  Patient's Stated Pain Goal: 4  Pain Type: Surgical pain  Pain Location: Leg  Pain Orientation: Right  Pain Descriptors: Aching, Sharp  Pain Frequency: Continuous  Pain Onset: On-going  Clinical Progression: Not changed  Functional Pain Assessment: Prevents or interferes some active activities and ADLs  Non-Pharmaceutical Pain Intervention(s): Repositioned, Rest  Response to Interventions: (patient is resting with respirations above ten eyes closed )  Response to Pain Intervention: Asleep with RR greater than 10  Multiple Pain Sites: No  RASS Score (Ventilated): Restless  PAINAD (Pain Assessment in Advance Dementia)  Breathing: normal  Negative Vocalization: none  Facial Expression: smiling or inexpressive  Body Language: relaxed  Consolability: no need to console  PAINAD Score: 0  Pain Assessment/FLACC  Pain Rating: FLACC (rest) - Face: no particular expression or smile  Pain Rating: FLACC (rest) - Legs: normal position or relaxed  Pain Rating: FLACC (rest) - Activity: lying quietly, normal position, moves easily  Pain Rating: FLACC (rest) - Cry: no cry (awake or asleep)  Pain Rating: FLACC (rest) - Consolability: content, relaxed  Score: FLACC (rest): 0    Reason for Referral  Geneva Martin was referred for a bedside swallow evaluation to assess the efficiency of his swallow function, identify signs and symptoms of aspiration and make recommendations regarding safe dietary consistencies, effective compensatory strategies, and safe eating environment. Impression  Dysphagia Diagnosis: Mild to moderate pharyngeal stage dysphagia;Mild oral stage dysphagia    Dysphagia Impression : Pt seen upright in bed alert and confused. Pt oriented to person and place. Pt not oriented to time consistently or situation that led to hospitalization. Pt has his own dentition with some back teeth missing. Oral strength/coordination is mildly reduced. Volitional swallow is delayed with reduced laryngeal elevation/excursion. Volitional cough is weak and non productive. There are congested lung sounds upon entry. Pt provided with ice chips and thin liquids via tsp/cup/straw. S/s of premature bolus loss and pharyngeal pooling are present prior to delayed swallow initiation. Inconsistent throat clearing noted.  Nectar thick compensatory swallow strategies during intake. Long-term Goals  Timeframe for Long-term Goals: 1 Week  Goal 1: Pt will tolerate least restrictive diet without observed s/s of aspiration. General  Chart Reviewed: Yes  Behavior/Cognition: Alert; Cooperative;Confused; Impulsive; Requires cueing;Distractible  Temperature Spikes Noted: No  Respiratory Status: Room air  Breath Sounds: Rhonchi  O2 Device: None (Room air)  Communication Observation: Functional  Follows Directions: Simple  Dentition: Adequate  Patient Positioning: Upright in bed  Baseline Vocal Quality: Weak  Volitional Cough: Weak;Congested; Wet  Volitional Swallow: Delayed  Prior Dysphagia History: Pt has no history of dysphagia. Consistencies Administered: Reg solid;Mechanical soft;Puree; Thin - cup; Thin - straw; Thin - teaspoon;Nectar - cup    Vision/Hearing  Vision  Vision Exceptions: Wears glasses at all times  Hearing  Hearing: Within functional limits    Oral Motor Deficits  Oral/Motor  Oral Motor: Exceptions to [facility]  Labial Strength: Reduced  Labial Coordination: Reduced  Lingual Strength: Reduced  Lingual Coordination: Reduced    Oral Phase Dysfunction  Oral Phase  Oral Phase: Exceptions  Oral Phase Dysfunction  Impaired Mastication: Reg Solid  Decreased Anterior to Posterior Transit: All  Suspected Premature Bolus Loss: All  Oral Phase  Oral Phase - Comment: Pt presents with a mild oral dysphagia characterized by prolonged and reduced mastication of regular solids and s/s of premature bolus loss to pharynx prior to swallow. Indicators of Pharyngeal Phase Dysfunction   Pharyngeal Phase  Pharyngeal Phase: Exceptions  Indicators of Pharyngeal Phase Dysfunction  Delayed Swallow: All  Decreased Laryngeal Elevation: All  Wet Vocal Quality: All(Inconsistent- not texture specific. )  Throat Clearing - Delayed: All(Inconsistent)  Cough - Immediate: Reg Solid  Cough - Delayed:  All  Pharyngeal Phase   Pharyngeal: Pt presents with a mild-moderate

## 2019-04-19 NOTE — PROGRESS NOTES
in chair;Call light within reach;Nurse notified; Chair alarm in place         Patient Diagnosis(es): The primary encounter diagnosis was Closed displaced fracture of lesser trochanter of right femur, initial encounter ([address] Diagnoses of Closed fracture of proximal end of right humerus, unspecified fracture morphology, initial encounter, Fall from slip, trip, or stumble, initial encounter, and Closed fracture of multiple ribs of left side, initial encounter were also pertinent to this visit. has a past medical history of BPH (benign prostatic hyperplasia) and Seizures ([address] has a past surgical history that includes Femur fracture surgery (Right, 4/16/2019). Restrictions  Restrictions/Precautions  Restrictions/Precautions: Fall Risk  Position Activity Restriction  Other position/activity restrictions: WBAT LEs   --- NWB right UE     Subjective   General  Chart Reviewed: Yes  Patient assessed for rehabilitation services?: Yes  Additional Pertinent Hx: Cristobal Gaitan MD 4/14/19: Pt is \"77 y.o. male who presented to [facility] after he had a mechanical fall, admitted to have right hip pain, 10 out of 10 intensity, nonradiating, sharp, worse with any movement, to note that patient is poor historian and he was intoxicated on presentation. Found to have right femoral and right humeral fracture along was 2 broken ribs. Hold on the right side, patient denies nausea, vomiting, abdominal pain or chest pain when examined. Denies any hallucination at this time, drinks on a daily basis. \"  Family / Caregiver Present: No  Referring Practitioner: BRANDY Zaragoza   Diagnosis: R femoral fracture   Subjective  Subjective: Pt met bedside, more alert this date and willing to get OOB. Pt agreeable for therapy.    Vital Signs  Patient Currently in Pain: (Winces in pain with movement )        Objective    Balance  Standing Balance: (Slumped posture requiring min/mod A)  Functional Mobility  Functional Mobility Comments: Pt unable to attempt ambulation at this time d/t decreased alertness and ability to follow cues. Use of silvia stedy at this time. Bed mobility  Supine to Sit: 2 Person assistance;Maximum assistance  Sit to Supine: Maximum assistance;2 Person assistance     Transfers  Sit to stand: 2 Person assistance  Stand to sit: 2 Person assistance  Transfer Comments: Max x2 for sit <> stand from EOB to silvia stedy and sit to recliner chair       Cognition  Overall Cognitive Status: Exceptions  Arousal/Alertness: Delayed responses to stimuli  Following Commands: Inconsistently follows commands; Does not follow commands  Attention Span: Unable to maintain attention  Safety Judgement: Decreased awareness of need for assistance;Decreased awareness of need for safety  Problem Solving: Assistance required to generate solutions;Assistance required to implement solutions  Insights: Decreased awareness of deficits  Initiation: Requires cues for all  Sequencing: Requires cues for all        Plan   Plan  Times per week: 3-5  Times per day: Daily  Current Treatment Recommendations: Strengthening, Functional Mobility Training, Endurance Training, Balance Training, Safety Education & Training, Equipment Evaluation, Education, & procurement, Patient/Caregiver Education & Training, Self-Care / ADL    AM-PAC Score    \[facility] [place] [facility]\"" scored a 11/24 on the AM-[facility] ADL Inpatient form. Current research shows that an AM-PAC score of 17 or less is typically not associated with a discharge to the patient's home setting. Based on the patients AM-PAC score and their current ADL deficits, it is recommended that the patient have 3-5 sessions per week of Occupational Therapy at d/c to increase the patients independence.      AM-PAC Inpatient Daily Activity Raw Score: 11  AM-PAC Inpatient ADL T-Scale Score : 29.04  ADL Inpatient CMS 0-100% Score: 70.42  ADL Inpatient CMS G-Code Modifier : CL    Goals  Short term goals  Time Frame for Short term goals: prior to d/c: status of all goals ongoing   Short term goal 1: Pt will complete sit <> stand transfer with mod A  Short term goal 2: Pt will complete bathing with mod A  Short term goal 3: Pt will complete dressing with modA  Short term goal 4: Pt will complete toileting with max A  Short term goal 5: Pt will complete grooming with setup   Patient Goals   Patient goals : Pt did not formulate goal at this time d/t decreased cognition       Therapy Time   Individual Concurrent Group Co-treatment   Time In       1105   Time Out       1135   Minutes       30   Timed Code Treatment Minutes: 30 Minutes     If pt is discharged prior to next OT session, this note will serve as the discharge summary.     Rd Briscoe

## 2019-04-19 NOTE — CARE COORDINATION
[facility] is still reviewing patient for possible admit. This is family's first choice for snf.      Electronically signed by Micky Newman on 4/19/2019 at 3:31 PM

## 2019-04-19 NOTE — PROGRESS NOTES
Pt remains A&O x4, he is getting more agitated and wants to go home. But is still aggregate to meds and treatment. Scheduled librium given, will continue to monitor.   Electronically signed by Rolly Krause RN on 4/19/2019 at 3:57 PM

## 2019-04-20 PROCEDURE — 97110 THERAPEUTIC EXERCISES: CPT

## 2019-04-20 PROCEDURE — 2580000003 HC RX 258: Performed by: ORTHOPAEDIC SURGERY

## 2019-04-20 PROCEDURE — 6370000000 HC RX 637 (ALT 250 FOR IP): Performed by: ORTHOPAEDIC SURGERY

## 2019-04-20 PROCEDURE — 94640 AIRWAY INHALATION TREATMENT: CPT

## 2019-04-20 PROCEDURE — 97530 THERAPEUTIC ACTIVITIES: CPT

## 2019-04-20 PROCEDURE — 6360000002 HC RX W HCPCS: Performed by: ORTHOPAEDIC SURGERY

## 2019-04-20 PROCEDURE — 94760 N-INVAS EAR/PLS OXIMETRY 1: CPT

## 2019-04-20 PROCEDURE — 1200000000 HC SEMI PRIVATE

## 2019-04-20 PROCEDURE — 6370000000 HC RX 637 (ALT 250 FOR IP): Performed by: INTERNAL MEDICINE

## 2019-04-20 RX ORDER — SENNA PLUS 8.6 MG/1
1 TABLET ORAL NIGHTLY
Status: DISCONTINUED | OUTPATIENT
Start: 2019-04-20 | End: 2019-04-22 | Stop reason: HOSPADM

## 2019-04-20 RX ORDER — ASPIRIN 325 MG
325 TABLET, DELAYED RELEASE (ENTERIC COATED) ORAL DAILY
Qty: 14 TABLET | Refills: 0 | Status: SHIPPED | OUTPATIENT
Start: 2019-05-02 | End: 2019-05-16

## 2019-04-20 RX ORDER — CHLORDIAZEPOXIDE HYDROCHLORIDE 5 MG/1
5 CAPSULE, GELATIN COATED ORAL 4 TIMES DAILY
Status: DISCONTINUED | OUTPATIENT
Start: 2019-04-20 | End: 2019-04-22 | Stop reason: HOSPADM

## 2019-04-20 RX ORDER — AMOXICILLIN AND CLAVULANATE POTASSIUM 875; 125 MG/1; MG/1
1 TABLET, FILM COATED ORAL EVERY 12 HOURS SCHEDULED
Status: COMPLETED | OUTPATIENT
Start: 2019-04-20 | End: 2019-04-21

## 2019-04-20 RX ADMIN — GABAPENTIN 300 MG: 300 CAPSULE ORAL at 21:20

## 2019-04-20 RX ADMIN — IPRATROPIUM BROMIDE AND ALBUTEROL SULFATE 1 AMPULE: .5; 3 SOLUTION RESPIRATORY (INHALATION) at 20:19

## 2019-04-20 RX ADMIN — SODIUM CHLORIDE: 9 INJECTION, SOLUTION INTRAVENOUS at 06:05

## 2019-04-20 RX ADMIN — Medication 100 MG: at 08:32

## 2019-04-20 RX ADMIN — IPRATROPIUM BROMIDE AND ALBUTEROL SULFATE 1 AMPULE: .5; 3 SOLUTION RESPIRATORY (INHALATION) at 11:20

## 2019-04-20 RX ADMIN — IPRATROPIUM BROMIDE AND ALBUTEROL SULFATE 1 AMPULE: .5; 3 SOLUTION RESPIRATORY (INHALATION) at 15:41

## 2019-04-20 RX ADMIN — LORAZEPAM 2 MG: 2 INJECTION INTRAMUSCULAR; INTRAVENOUS at 18:50

## 2019-04-20 RX ADMIN — PHENYTOIN SODIUM 200 MG: 100 CAPSULE ORAL at 21:20

## 2019-04-20 RX ADMIN — FAMOTIDINE 20 MG: 20 TABLET ORAL at 08:32

## 2019-04-20 RX ADMIN — ENOXAPARIN SODIUM 40 MG: 40 INJECTION SUBCUTANEOUS at 21:20

## 2019-04-20 RX ADMIN — GABAPENTIN 300 MG: 300 CAPSULE ORAL at 14:35

## 2019-04-20 RX ADMIN — FOLIC ACID 1 MG: 1 TABLET ORAL at 08:32

## 2019-04-20 RX ADMIN — AZITHROMYCIN 250 MG: 250 TABLET, FILM COATED ORAL at 08:32

## 2019-04-20 RX ADMIN — DOCUSATE SODIUM 100 MG: 100 CAPSULE, LIQUID FILLED ORAL at 21:20

## 2019-04-20 RX ADMIN — SENNOSIDES 8.6 MG: 8.6 TABLET, FILM COATED ORAL at 21:20

## 2019-04-20 RX ADMIN — CHLORDIAZEPOXIDE HYDROCHLORIDE 5 MG: 5 CAPSULE ORAL at 21:20

## 2019-04-20 RX ADMIN — PHENYTOIN SODIUM 200 MG: 100 CAPSULE ORAL at 08:32

## 2019-04-20 RX ADMIN — FAMOTIDINE 20 MG: 20 TABLET ORAL at 21:20

## 2019-04-20 RX ADMIN — DOCUSATE SODIUM 100 MG: 100 CAPSULE, LIQUID FILLED ORAL at 08:32

## 2019-04-20 RX ADMIN — Medication 10 ML: at 21:20

## 2019-04-20 RX ADMIN — IPRATROPIUM BROMIDE AND ALBUTEROL SULFATE 1 AMPULE: .5; 3 SOLUTION RESPIRATORY (INHALATION) at 07:40

## 2019-04-20 RX ADMIN — AMPICILLIN SODIUM AND SULBACTAM SODIUM 1.5 G: 1; .5 INJECTION, POWDER, FOR SOLUTION INTRAMUSCULAR; INTRAVENOUS at 03:12

## 2019-04-20 RX ADMIN — GABAPENTIN 300 MG: 300 CAPSULE ORAL at 08:32

## 2019-04-20 RX ADMIN — AMOXICILLIN AND CLAVULANATE POTASSIUM 1 TABLET: 875; 125 TABLET, FILM COATED ORAL at 11:38

## 2019-04-20 RX ADMIN — CHLORDIAZEPOXIDE HYDROCHLORIDE 5 MG: 5 CAPSULE ORAL at 14:35

## 2019-04-20 RX ADMIN — CHLORDIAZEPOXIDE HYDROCHLORIDE 10 MG: 5 CAPSULE ORAL at 08:32

## 2019-04-20 RX ADMIN — AMOXICILLIN AND CLAVULANATE POTASSIUM 1 TABLET: 875; 125 TABLET, FILM COATED ORAL at 21:20

## 2019-04-20 RX ADMIN — TAMSULOSIN HYDROCHLORIDE 0.4 MG: 0.4 CAPSULE ORAL at 08:32

## 2019-04-20 ASSESSMENT — PAIN SCALES - GENERAL
PAINLEVEL_OUTOF10: 0

## 2019-04-20 ASSESSMENT — PAIN DESCRIPTION - LOCATION: LOCATION: LEG

## 2019-04-20 ASSESSMENT — PAIN SCALES - WONG BAKER
WONGBAKER_NUMERICALRESPONSE: 0
WONGBAKER_NUMERICALRESPONSE: 0

## 2019-04-20 ASSESSMENT — PAIN DESCRIPTION - PAIN TYPE
TYPE: SURGICAL PAIN
TYPE: SURGICAL PAIN

## 2019-04-20 ASSESSMENT — PAIN DESCRIPTION - ORIENTATION: ORIENTATION: RIGHT

## 2019-04-20 NOTE — PROGRESS NOTES
Progress Note  Admit Date: 2019      PCP: Waldemar Severin, MD     CC: F/U for fall    SUBJECTIVE / Interval History:  Better confused . Better than yesterday   Has leg pain     Had a run of SVT in am       Allergies  Patient has no known allergies. Medications    Scheduled Meds:   chlordiazePOXIDE  10 mg Oral 4x Daily    thiamine  100 mg Oral Daily    folic acid  1 mg Oral Daily    ampicillin-sulbactam  1.5 g Intravenous Q6H    sodium chloride flush  10 mL Intravenous 2 times per day    docusate sodium  100 mg Oral BID    enoxaparin  40 mg Subcutaneous Nightly    ipratropium-albuterol  1 ampule Inhalation Q4H WA    azithromycin  250 mg Oral Daily    famotidine  20 mg Oral BID    phenytoin  200 mg Oral BID    tamsulosin  0.4 mg Oral Daily    gabapentin  300 mg Oral TID     Continuous Infusions:   sodium chloride 100 mL/hr at 19 0605       PRN Meds:  sodium chloride flush, ondansetron, LORazepam **OR** LORazepam **OR** LORazepam **OR** LORazepam **OR** LORazepam **OR** LORazepam **OR** LORazepam **OR** LORazepam, magnesium hydroxide, acetaminophen, cyclobenzaprine, oxyCODONE-acetaminophen **OR** oxyCODONE-acetaminophen, morphine **OR** morphine    Vitals    TEMPERATURE:  Current - Temp: 98.1 °F (36.7 °C); Max - Temp  Av.2 °F (36.8 °C)  Min: 97.8 °F (36.6 °C)  Max: 98.9 °F (37.2 °C)  RESPIRATIONS RANGE: Resp  Av.9  Min: 16  Max: 20  PULSE RANGE: Pulse  Av  Min: 99  Max: 105  BLOOD PRESSURE RANGE:  Systolic (15JPB), YSP:131 , Min:125 , SZP:841   ; Diastolic (96TZD), XVK:40, Min:61, Max:78    PULSE OXIMETRY RANGE: SpO2  Av.6 %  Min: 91 %  Max: 99 %  24HR INTAKE/OUTPUT:      Intake/Output Summary (Last 24 hours) at 2019 0911  Last data filed at 2019 0657  Gross per 24 hour   Intake 0 ml   Output 950 ml   Net -950 ml       Exam:    Gen: No distress. Eyes: PERRL. No sclera icterus. No conjunctival injection. ENT: No discharge. Pharynx clear.  External appearance of ears and nose normal.  Neck: Trachea midline. No obvious mass. Resp: No accessory muscle use. No crackles. No wheezes. Few rhonchi. No dullness on percussion. CV: Regular rate. Regular rhythm. No murmur or rub. No edema. GI: Non-tender. Non-distended. No hernia. Skin: Warm, dry, normal texture and turgor. Rash + L arm. Dressing hip +   Lymph: No cervical LAD. No supraclavicular LAD. M/S: No cyanosis. No clubbing. R arm in sling, leg movement limited    Neuro: Moves all four extremities. CN 2-12 tested, no defect noted. Psych: Oriented x 1. Lethargic +    Data    LABS  CBC:   Recent Labs     04/18/19  0556 04/19/19  0531   WBC 11.5* 9.3   HGB 8.1* 8.0*   HCT 23.9* 23.4*   .3* 102.8*    173     BMP:   No results for input(s): NA, K, CL, CO2, PHOS, BUN, CREATININE in the last 72 hours. Invalid input(s): CA  POC GLUCOSE:  No results for input(s): POCGLU in the last 72 hours. LIVER PROFILE:   No results for input(s): AST, ALT, LIPASE, AMYLASE, LABALBU, BILIDIR, BILITOT, ALKPHOS in the last 72 hours. PT/INR:   No results for input(s): PROTIME, INR in the last 72 hours. APTT:   No results for input(s): APTT in the last 72 hours. UA:No results for input(s): NITRITE, COLORU, PHUR, LABCAST, WBCUA, RBCUA, MUCUS, TRICHOMONAS, YEAST, BACTERIA, CLARITYU, SPECGRAV, LEUKOCYTESUR, UROBILINOGEN, BILIRUBINUR, BLOODU, GLUCOSEU, KETUA, AMORPHOUS in the last 72 hours. Microbiology:  Wound Culture: No results for input(s): WNDABS, ORG in the last 72 hours. Invalid input(s):  LABGRAM  Nasal Culture: No results for input(s): ORG, MRSAPCR in the last 72 hours. Blood Culture: No results for input(s): BC, BLOODCULT2 in the last 72 hours. Fungal Culture:   No results for input(s): FUNGSM in the last 72 hours. No results for input(s): FUNCXBLD in the last 72 hours.   CSF Culture:  No results for input(s): COLORCSF, APPEARCSF, CFTUBE, CLOTCSF, WBCCSF, RBCCSF, NEUTCSF, NUMCELLSCSF, LYMPHSCSF, MONOCSF, GLUCCSF, VOLCSF in the last 72 hours. Respiratory Culture:  No results for input(s): David Schlatter in the last 72 hours. AFB:No results for input(s): AFBSMEAR in the last 72 hours. Urine Culture  No results for input(s): LABURIN in the last 72 hours. RADIOLOGY:    FL MODIFIED BARIUM SWALLOW W VIDEO   Final Result   No bert aspiration. Please see separate speech pathology report for full discussion of findings   and recommendations. XR HIP RIGHT (2-3 VIEWS)   Final Result   Status post ORIF proximal right femur fracture. Correlate with procedural   report. FLUORO FOR SURGICAL PROCEDURES   Final Result      XR CHEST PORTABLE   Final Result   Unchanged left-sided rib fractures. No underlying pneumothorax or pleural   effusion. Suspect lower right-sided rib fractures, age indeterminate. XR HIP RIGHT (2-3 VIEWS)   Final Result   Proximal right femoral shaft fracture with suspected involvement of the   lesser trochanter. XR FEMUR RIGHT (MIN 2 VIEWS)   Final Result   Proximal right femur fracture with involvement of the lesser trochanter. XR SHOULDER RIGHT (MIN 2 VIEWS)   Final Result   Nondisplaced proximal right humeral fracture. CT HEAD WO CONTRAST   Final Result   No acute intracranial abnormality. Areas of encephalomalacia in the inferior right frontal lobe and anterior   right temporal lobe are compatible with sequela of prior trauma. Moderate microvascular ischemic disease. XR CHEST 1 VW   Final Result   Minimally displaced left 7th and 8th rib fractures. No underlying   pneumothorax or pleural effusion. Diffuse small airway inflammation may be seen with asthma, bronchitis or   smoking. No consolidative airspace disease.              CONSULTS:    IP CONSULT TO ORTHOPEDIC SURGERY  IP CONSULT TO HOSPITALIST  IP CONSULT TO SOCIAL WORK  IP CONSULT TO CARDIOLOGY  IP CONSULT TO PULMONOLOGY    ASSESSMENT AND PLAN: Principal Problem:    Right femoral fracture [facility])  Active Problems:    Fall    Right humeral fracture    Alcohol intoxication ([facility] [address]    Hypomagnesemia    Epilepsia ([facility] [address]    Closed fracture of neck of right femur (HCC)    Alcohol dependence ([facility] [address]    Multiple rib fractures    Syncope    Shortness of breath    Bronchitis    Tobacco abuse    ETOH abuse  Resolved Problems:    * No resolved hospital problems. *    70 y.o. male with a past medical history of alcohol dependence and withdrawal seizures  who presented to [facility] after he had a mechanical fall. Patient was intoxicated on admission. He was admitted with a right femoral shaft fracture along with humerus fracture and rib fracture. Patient underwent ORIF on 4/16/2019. Hospital course was complicated by delirium tremens and acute blood loss anemia along with bronchitis and possible aspiration pneumonia. Confused, delirious- treat as DT- slightly better   -  sec to alcohol withdrawal  - start CIWA  -scheduled librium , dose decreased    Right femoral fracture shaft and subtrochanteric  - ortho consulted  - s/p ORIF on 4/16/19     Acute blood loss anemia- hb stable  - monitor   - sec to surgery     Leucocytosis- resolved   - ? Reactive   - monitor     Humeral fracture non displaced    pain management, per orthopedic. R rib fracture  - pain control  -incentive spirometry     Alcohol dependence with abuse  - CIWA   - banana bag      History of epilepsy.  -ct home meds     Hypomagnesemia, replaced. Cough with yellow sputum- treat as bronchitis/ aspiration pneumonia    - duo neb  -ssi  -add unasyn to cover aspiration . Stop tomorrow  -procal elevated         DVT Prophylaxis: lovenox  Diet: Dietary Nutrition Supplements: Standard High Calorie Oral Supplement  DIET GENERAL; Dysphagia III Advanced;  No Drinking Straw  Code Status: Full Code    PT/OT Eval Status:post surgery    Discharge plan - pending recovery from withdrawal ,hopefully tomorrow    The

## 2019-04-20 NOTE — PROGRESS NOTES
Physical Therapy    Facility/Department: [address] ORTHOPEDICS  Treatment note    NAME: Hao Shin  : 1955  MRN: [id number]    Date of Service: 2019    Discharge Recommendations:  Remains:  Hao Shin scored a  on the AM-PAC short mobility form. Current research shows that an AM-PAC score of 17 or less is typically not associated with a discharge to the patient's home setting. Based on the patients AM-PAC score and their current functional mobility deficits, it is recommended that the patient have 3-5 sessions per week of Physical Therapy at d/ to increase the patients independence. Assessment   Body structures, Functions, Activity limitations: Decreased functional mobility ; Decreased ADL status; Decreased safe awareness;Decreased ROM; Decreased balance;Decreased cognition  Prognosis: Fair  Activity Tolerance  Activity Tolerance: Patient limited by cognitive status; Patient limited by endurance; Patient Tolerated treatment well       Patient Diagnosis(es): The primary encounter diagnosis was Closed displaced fracture of lesser trochanter of right femur, initial encounter ([facility] [address] Diagnoses of Closed fracture of proximal end of right humerus, unspecified fracture morphology, initial encounter, Fall from slip, trip, or stumble, initial encounter, and Closed fracture of multiple ribs of left side, initial encounter were also pertinent to this visit. has a past medical history of BPH (benign prostatic hyperplasia) and Seizures ([address] has a past surgical history that includes Femur fracture surgery (Right, 2019). Restrictions  Restrictions/Precautions  Restrictions/Precautions: Fall Risk  Position Activity Restriction  Other position/activity restrictions: WBAT LEs   --- NWB right UE  Vision/Hearing  Vision: (wears glasses)  Vision Exceptions: Wears glasses at all times  Hearing: Within functional limits     Subjective  General  Chart Reviewed:  Yes  Additional Pertinent Hx: term goals: 2-3 days  Short term goal 1: bed mobility at mod assist of 2   Short term goal 2: transfers at mod assist of 2   Short term goal 3: ambulation goals deferred   Patient Goals   Patient goals : patient not able to indicate       Therapy Time   Individual Concurrent Group Co-treatment   Time In           Time Out [address], PT

## 2019-04-20 NOTE — PROGRESS NOTES
Pt refuses to take librium at this time. Medication returned to [facility].  Electronically signed by Elijah Parikh RN on 4/20/2019 at 5:46 PM

## 2019-04-20 NOTE — PROGRESS NOTES
PCA calls nurse at this time asks that nurse assess pt IV Access, as she believes it looks swollen. Nurse enters, left FA minimally swollen, nurse flushes site with resistance. Nurse asks pt if pain associated when flushed and pt states,\"Ya. \" Nurse removes IV access from left FA. Pt tolerates well. Catheter intact, dry dressing applied to site. Will continue to monitor.  Electronically signed by Zacarias Cartwright RN on 4/20/2019 at 7:35 AM

## 2019-04-20 NOTE — PROGRESS NOTES
CMU calls at this time to advise nurse that the pt is attempting to get out of bed at this time. Nurse enters to find pt with BLE over the left bottom siderail of the bed. X2 nurses assist pt back into bed fully and nurse to attempt IV access to administer IV Ativan, pt CIWA score 12 at this time.  Electronically signed by Alva Riggins RN on 4/20/2019 at 6:11 PM

## 2019-04-20 NOTE — DISCHARGE INSTR - COC
[facility]) Continuity of Care Form    Patient Name:  Michelle Giang  : 1955    MRN:  [id number]    Admit date:  2019  Discharge date:  19    Code Status Order: Full Code  Advance Directives: No    Admitting Physician: Rose Earl MD  PCP: Sascha Irizarry MD    Discharging Nurse: Maurilio Pate [id number] Unit/Room#: U4Y-5710/4045-00  Discharging Unit Phone Number: [phone]    Emergency Contact:        Past Surgical History:  Past Surgical History:   Procedure Laterality Date    FEMUR FRACTURE SURGERY Right 2019    INTRAMEDULLARY NAILING RIGHT FEMUR performed by Gee Gonzalez MD at [place] [address]       Immunization History: There is no immunization history on file for this patient. Active Problems:  Principal Problem:    Right femoral fracture [facility])  Active Problems:    Fall    Right humeral fracture    Alcohol intoxication ([facility] [address]    Hypomagnesemia    Epilepsia ([facility] [address]    Closed fracture of neck of right femur (HCC)    Alcohol dependence ([facility] [address]    Multiple rib fractures    Syncope    Shortness of breath    Bronchitis    Tobacco abuse    ETOH abuse  Resolved Problems:    * No resolved hospital problems. *      Isolation/Infection:       Nurse Assessment:  Last Vital Signs:/68   Pulse 102   Temp 98.1 °F (36.7 °C) (Oral)   Resp 17   Ht 5' 9\" (1.753 m)   Wt 173 lb 11.6 oz (78.8 kg)   SpO2 96%   BMI 25.65 kg/m²   Last documented pain score (0-10 scale): Pain Level: 0  Last Weight:   Wt Readings from Last 1 Encounters:   19 173 lb 11.6 oz (78.8 kg)     Mental Status:  oriented and alert     IV Access:  - None    Nursing Mobility/ADLs:  Walking   Dependent  Transfer  Dependent  Bathing  Assisted  Dressing  Assisted  Toileting  Assisted  Feeding  Assisted  Med Admin  Assisted  Med Delivery   crushed    Wound Care Documentation and Therapy:  Change dressing daily and cleanse wound with rubbing alcohol Apply dry dressing and tegaderm.  Can leave wound to air on POD#7 if no drainage  If staples are in place they will need to be removed on or by POD#14. If questions about getting them removed please call Dr Donnie Rocha assistant, Nicholas Pittman,  at office. [phone]        Elimination:  Urinary Catheter: None   Colostomy/Ileostomy: No  Continence:   · Bowel: No  · Bladder: No  Date of Last BM: 4/22/19    Intake/Output Summary (Last 24 hours)     Intake/Output Summary (Last 24 hours) at 4/20/2019 0906  Last data filed at 4/20/2019 8394  Gross per 24 hour   Intake 0 ml   Output 950 ml   Net -950 ml     Safety Concerns:     History of Falls (last 30 days), At Risk for Falls and Aspiration Risk    Impairments/Disabilities:      Vision    Nutrition Therapy:  Current Nutrition Therapy: Dietary Nutrition Supplements: Standard High Calorie Oral Supplement  DIET GENERAL; Dysphagia III Advanced; No Drinking Straw  Routes of Feeding: Oral  Liquids: Thin Liquids  Daily Fluid Restriction: no  Last Modified Barium Swallow with Video (Video Swallowing Test): not done    Treatments at the Time of Hospital Discharge:   Respiratory Treatments: Yes  Oxygen Therapy:  is not on home oxygen therapy.   Ventilator:    - No ventilator support    Lab orders for discharge:        Rehab Therapies: Physical Therapy, Occupational Therapy and nursing care  Weight Bearing Status/Restrictions: Partial weight bearing (30-50%) only on leg right leg  Other Medical Equipment (for information only, NOT a DME order): rolling walker   Other Treatments: N/A    Patient's personal belongings (please select all that are sent with patient):  Khushbu    RN SIGNATURE:  Electronically signed by Jim Broderick RN on 4/22/19 at 2:26 PM    PHYSICIAN SECTION    Prognosis: Good    Condition at Discharge: Stable    Rehab Potential (if transferring to Rehab): Good    Physician Certification: I certify the above orders, information, and transfer of Norman Gill is necessary for the continuing treatment of the diagnosis listed and that he

## 2019-04-20 NOTE — PROGRESS NOTES
Burnette catheter dc'd at this time per MD order. 10cc fluid emptied from balloon, catheter removed, pt tolerates well. 300cc emptied from burnette catheter bag prior to dc. Pt placed on bedpan at this time for BM. Will continue to monitor.  Electronically signed by Karyle Huge, RN on 4/20/2019 at 10:35 AM

## 2019-04-20 NOTE — PROGRESS NOTES
Pt back to bed at this time X2 w/stedy. Pt reports need for BM. Pt placed on bedpan, will continue to monitor.  Electronically signed by Favian Shaw RN on 4/20/2019 at 2:15 PM

## 2019-04-20 NOTE — PROGRESS NOTES
Pt picking at dressing off so new one was placed. Small serosanguinous drainage noted. Will continue to monitor.    Electronically signed by Beth Pickens RN on 4/20/2019 at 3:02 AM

## 2019-04-20 NOTE — PROGRESS NOTES
Pt in bed at this time. Pt more agitated this afternoon. Pt states,\"You(the nurse) has done something to this phone and now I cant call my brother. \" Nurse has done nothing to the phone, call and left voicemail for his brother who at this time has not returned the call. Nurse exits and pt begins yelling out that nurse \"has done something to his phone and he wants it now! \" Nurse re-enters room, advises pt that a message has been left for his brother and that he has not called back yet. Nurse turns phone on and hands the phone to the pt and he places the mouth piece to his ear and says that its broken. Nurse advises pt he has the phone incorrectly placed to head and attempts to fix and pt will not allow nurse to turn the phone. Nurse advises pt the phone is not broken, its placement of the phone by the pt to his ear and nurse exits pt room. Bed alarm/telecam intact.  Electronically signed by Royal Yinka RN on 4/20/2019 at 4:25 PM

## 2019-04-20 NOTE — PROGRESS NOTES
Nurse changes pt dressing on rt hip at this time. X2 ABD pads, w/tegaderm. Pt tolerates well. Will continue to monitor.  Electronically signed by Royal Yinka RN on 4/20/2019 at 6:13 PM

## 2019-04-20 NOTE — PLAN OF CARE
Problem: Pain:  Goal: Pain level will decrease  Description  Pain level will decrease  4/20/2019 0736 by Melinda Warren RN  Outcome: Ongoing  Note:   Pt assessed for pain. Pt in 8/10 pain and assessed with 0-10 pain rating scale. Pt given prescribed analgesic for pain. (See eMar) Pt satisfied with pain relief thus far. Will reassess and continue to monitor. Electronically signed by Melinda Warren RN on 4/20/2019 at 7:37 AM      4/20/2019 0149 by Marina Farfan RN  Outcome: Ongoing  Note:   Educated patient on pain management. Will assess patients pain level per unit protocol, and provide pain management measures as needed.       Goal: Control of acute pain  Description  Control of acute pain  Outcome: Ongoing  Goal: Control of chronic pain  Description  Control of chronic pain  Outcome: Ongoing  Goal: Patient's pain/discomfort is manageable  Description  Patient's pain/discomfort is manageable  Outcome: Ongoing     Problem: Discharge Planning:  Goal: Discharged to appropriate level of care  Description  Discharged to appropriate level of care  Outcome: Ongoing     Problem: Fluid Volume - Deficit:  Goal: Absence of fluid volume deficit signs and symptoms  Description  Absence of fluid volume deficit signs and symptoms  Outcome: Ongoing     Problem: Nutrition Deficit:  Goal: Ability to achieve adequate nutritional intake will improve  Description  Ability to achieve adequate nutritional intake will improve  Outcome: Ongoing     Problem: Sleep Pattern Disturbance:  Goal: Appears well-rested  Description  Appears well-rested  Outcome: Ongoing     Problem: Violence - Risk of, Self/Other-Directed:  Goal: Knowledge of developmental care interventions  Description  Absence of violence  Outcome: Ongoing     Problem: Safety:  Goal: Free from accidental physical injury  Description  Free from accidental physical injury  4/20/2019 0736 by Melinda Warren RN  Outcome: Ongoing  Note:   Bed in lowest position, wheels locked, bed exit alarm in place, call light within reach, and non skid footwear on. Walkway free of clutter. Pt alert and disoriented and unable to make needs known. Pt educated to use call light. Will continue to monitor. Electronically signed by Geri Turcios RN on 4/20/2019 at 7:38 AM      4/20/2019 0149 by Sunil Kaiser RN  Outcome: Ongoing  Note:   Pt is free of injury. No injury noted. Fall precautions in place. Call light within reach. Will monitor. Goal: Free from intentional harm  Description  Free from intentional harm  Outcome: Ongoing     Problem: Daily Care:  Goal: Daily care needs are met  Description  Daily care needs are met  4/20/2019 0736 by Geri Turcios RN  Outcome: Ongoing  Note:   Pt unable to perform ADL's without assistance. Pt unable to make needs known and calls out for assistance when needed. Will continue to monitor closely, bed alarm intact/telecam in place. Electronically signed by Geri Turicos RN on 4/20/2019 at 7:39 AM      4/20/2019 0149 by Sunil Kaiser RN  Outcome: Ongoing  Note:   Daily care needs have been met by staff and patient. Will continue to monitor needs. Problem: Skin Integrity:  Goal: Skin integrity will stabilize  Description  Skin integrity will stabilize  4/20/2019 0736 by Geri Turcios RN  Outcome: Ongoing  Note:   Pt assessed for skin break down. Skin was warm and dry to touch. Pt cannot regulate head of bed without assistance. Pt being turned or repositioned at least every 2 hours to prevent skin breakdown. Will continue to monitor and assess. Electronically signed by Geri Turcios RN on 4/20/2019 at 7:40 AM      4/20/2019 0149 by Sunil Kaiser RN  Outcome: Ongoing  Note:   Dressing is intact.  No new skin breakdown present at this time     Problem: Discharge Planning:  Goal: Patients continuum of care needs are met  Description  Patients continuum of care needs are met  Outcome: Ongoing     Problem: Risk for Impaired Skin Integrity  Goal: Tissue integrity - skin and mucous membranes  Description  Structural intactness and normal physiological function of skin and  mucous membranes. Outcome: Ongoing     Problem: Falls - Risk of:  Goal: Will remain free from falls  Description  Will remain free from falls  4/20/2019 0736 by Alva Riggins RN  Outcome: Ongoing  Note:   Fall risk assessment completed. Fall precautions in place. Call light within reach. Pt educated on calling for assistance before getting up. Walkway free of clutter. Patient bed alarm and telecam are intact, encouraged patient to call for assistance especially if feeling dizzy, SOB, or weakness. Will continue to monitor. Electronically signed by Alva Riggins RN on 4/20/2019 at 7:40 AM      4/20/2019 0149 by Kenny Vargas RN  Outcome: Ongoing  Note:   Fall risk assessment completed. Fall precautions in place. Call light within reach. Pt educated on calling for assistance before getting up. Walkway free of clutter. Will continue to monitor. Goal: Absence of physical injury  Description  Absence of physical injury  4/20/2019 0736 by Alva Riggins RN  Outcome: Ongoing  4/20/2019 0149 by Kenny Vargas RN  Note:   Pt is free of injury. No injury noted. Fall precautions in place. Call light within reach. Will monitor.         Problem: Nutrition  Goal: Optimal nutrition therapy  Outcome: Ongoing

## 2019-04-21 PROCEDURE — 94760 N-INVAS EAR/PLS OXIMETRY 1: CPT

## 2019-04-21 PROCEDURE — 94668 MNPJ CHEST WALL SBSQ: CPT

## 2019-04-21 PROCEDURE — 94667 MNPJ CHEST WALL 1ST: CPT

## 2019-04-21 PROCEDURE — 2580000003 HC RX 258: Performed by: ORTHOPAEDIC SURGERY

## 2019-04-21 PROCEDURE — 6370000000 HC RX 637 (ALT 250 FOR IP): Performed by: ORTHOPAEDIC SURGERY

## 2019-04-21 PROCEDURE — 6370000000 HC RX 637 (ALT 250 FOR IP): Performed by: INTERNAL MEDICINE

## 2019-04-21 PROCEDURE — 1200000000 HC SEMI PRIVATE

## 2019-04-21 PROCEDURE — 94640 AIRWAY INHALATION TREATMENT: CPT

## 2019-04-21 PROCEDURE — 6360000002 HC RX W HCPCS: Performed by: ORTHOPAEDIC SURGERY

## 2019-04-21 RX ADMIN — CHLORDIAZEPOXIDE HYDROCHLORIDE 5 MG: 5 CAPSULE ORAL at 10:17

## 2019-04-21 RX ADMIN — IPRATROPIUM BROMIDE AND ALBUTEROL SULFATE 1 AMPULE: .5; 3 SOLUTION RESPIRATORY (INHALATION) at 16:39

## 2019-04-21 RX ADMIN — AZITHROMYCIN 250 MG: 250 TABLET, FILM COATED ORAL at 10:17

## 2019-04-21 RX ADMIN — PHENYTOIN SODIUM 200 MG: 100 CAPSULE ORAL at 10:16

## 2019-04-21 RX ADMIN — DOCUSATE SODIUM 100 MG: 100 CAPSULE, LIQUID FILLED ORAL at 21:22

## 2019-04-21 RX ADMIN — FAMOTIDINE 20 MG: 20 TABLET ORAL at 21:22

## 2019-04-21 RX ADMIN — LORAZEPAM 2 MG: 2 INJECTION INTRAMUSCULAR; INTRAVENOUS at 17:01

## 2019-04-21 RX ADMIN — FOLIC ACID 1 MG: 1 TABLET ORAL at 10:17

## 2019-04-21 RX ADMIN — SENNOSIDES 8.6 MG: 8.6 TABLET, FILM COATED ORAL at 21:22

## 2019-04-21 RX ADMIN — AMOXICILLIN AND CLAVULANATE POTASSIUM 1 TABLET: 875; 125 TABLET, FILM COATED ORAL at 21:22

## 2019-04-21 RX ADMIN — CYCLOBENZAPRINE HYDROCHLORIDE 10 MG: 10 TABLET, FILM COATED ORAL at 06:33

## 2019-04-21 RX ADMIN — IPRATROPIUM BROMIDE AND ALBUTEROL SULFATE 1 AMPULE: .5; 3 SOLUTION RESPIRATORY (INHALATION) at 08:40

## 2019-04-21 RX ADMIN — IPRATROPIUM BROMIDE AND ALBUTEROL SULFATE 1 AMPULE: .5; 3 SOLUTION RESPIRATORY (INHALATION) at 19:09

## 2019-04-21 RX ADMIN — TAMSULOSIN HYDROCHLORIDE 0.4 MG: 0.4 CAPSULE ORAL at 10:17

## 2019-04-21 RX ADMIN — Medication 100 MG: at 10:17

## 2019-04-21 RX ADMIN — Medication 10 ML: at 21:23

## 2019-04-21 RX ADMIN — AMOXICILLIN AND CLAVULANATE POTASSIUM 1 TABLET: 875; 125 TABLET, FILM COATED ORAL at 10:16

## 2019-04-21 RX ADMIN — CHLORDIAZEPOXIDE HYDROCHLORIDE 5 MG: 5 CAPSULE ORAL at 16:42

## 2019-04-21 RX ADMIN — CHLORDIAZEPOXIDE HYDROCHLORIDE 5 MG: 5 CAPSULE ORAL at 13:36

## 2019-04-21 RX ADMIN — DOCUSATE SODIUM 100 MG: 100 CAPSULE, LIQUID FILLED ORAL at 10:17

## 2019-04-21 RX ADMIN — OXYCODONE HYDROCHLORIDE AND ACETAMINOPHEN 2 TABLET: 5; 325 TABLET ORAL at 06:33

## 2019-04-21 RX ADMIN — ENOXAPARIN SODIUM 40 MG: 40 INJECTION SUBCUTANEOUS at 21:23

## 2019-04-21 RX ADMIN — GABAPENTIN 300 MG: 300 CAPSULE ORAL at 10:16

## 2019-04-21 RX ADMIN — IPRATROPIUM BROMIDE AND ALBUTEROL SULFATE 1 AMPULE: .5; 3 SOLUTION RESPIRATORY (INHALATION) at 12:18

## 2019-04-21 RX ADMIN — FAMOTIDINE 20 MG: 20 TABLET ORAL at 10:16

## 2019-04-21 RX ADMIN — CHLORDIAZEPOXIDE HYDROCHLORIDE 5 MG: 5 CAPSULE ORAL at 21:22

## 2019-04-21 RX ADMIN — GABAPENTIN 300 MG: 300 CAPSULE ORAL at 21:22

## 2019-04-21 RX ADMIN — PHENYTOIN SODIUM 200 MG: 100 CAPSULE ORAL at 21:22

## 2019-04-21 RX ADMIN — Medication 10 ML: at 10:16

## 2019-04-21 RX ADMIN — GABAPENTIN 300 MG: 300 CAPSULE ORAL at 16:42

## 2019-04-21 ASSESSMENT — PAIN SCALES - PAIN ASSESSMENT IN ADVANCED DEMENTIA (PAINAD)
TOTALSCORE: 0
FACIALEXPRESSION: 0
BREATHING: 0
BREATHING: 0
TOTALSCORE: 0
FACIALEXPRESSION: 0
CONSOLABILITY: 0
NEGVOCALIZATION: 0
BODYLANGUAGE: 0
BODYLANGUAGE: 0
NEGVOCALIZATION: 0
CONSOLABILITY: 0
TOTALSCORE: 0
TOTALSCORE: 0
FACIALEXPRESSION: 0
BREATHING: 0
BREATHING: 0
NEGVOCALIZATION: 0
NEGVOCALIZATION: 0
CONSOLABILITY: 0
CONSOLABILITY: 0
FACIALEXPRESSION: 0
BODYLANGUAGE: 0
BODYLANGUAGE: 0

## 2019-04-21 ASSESSMENT — PAIN SCALES - GENERAL
PAINLEVEL_OUTOF10: 0
PAINLEVEL_OUTOF10: 5
PAINLEVEL_OUTOF10: 7
PAINLEVEL_OUTOF10: 5

## 2019-04-21 ASSESSMENT — PAIN DESCRIPTION - PAIN TYPE
TYPE: ACUTE PAIN

## 2019-04-21 ASSESSMENT — PAIN DESCRIPTION - LOCATION
LOCATION: KNEE;LEG

## 2019-04-21 ASSESSMENT — PAIN DESCRIPTION - DESCRIPTORS
DESCRIPTORS: ACHING
DESCRIPTORS: ACHING
DESCRIPTORS: ACHING;CONSTANT

## 2019-04-21 ASSESSMENT — PAIN DESCRIPTION - ORIENTATION
ORIENTATION: RIGHT

## 2019-04-21 ASSESSMENT — PAIN DESCRIPTION - ONSET
ONSET: ON-GOING
ONSET: ON-GOING

## 2019-04-21 ASSESSMENT — PAIN DESCRIPTION - PROGRESSION
CLINICAL_PROGRESSION: GRADUALLY WORSENING
CLINICAL_PROGRESSION: GRADUALLY IMPROVING
CLINICAL_PROGRESSION: GRADUALLY IMPROVING

## 2019-04-21 ASSESSMENT — PAIN DESCRIPTION - FREQUENCY
FREQUENCY: CONTINUOUS

## 2019-04-21 NOTE — PLAN OF CARE
Problem: Pain:  Goal: Pain level will decrease  Description  Pain level will decrease  Outcome: Ongoing     Problem: Violence - Risk of, Self/Other-Directed:  Goal: Knowledge of developmental care interventions  Description  Absence of violence  Outcome: Ongoing  Note:   Patient has not been violent up to this point of shift. Pt has been calm and pleasant. Problem: Pain:  Goal: Control of acute pain  Description  Control of acute pain  Note:   Pain/discomfort being managed with PRN analgesics per MD orders. Pt able to express presence and absence of pain and no always able to rate pain appropriately using numerical scale. Problem: Discharge Planning:  Goal: Discharged to appropriate level of care  Description  Discharged to appropriate level of care  Note:   Patient will be discharged to a ECF when the time is appropriate, possibly [facility]. Problem: Fluid Volume - Deficit:  Goal: Absence of fluid volume deficit signs and symptoms  Description  Absence of fluid volume deficit signs and symptoms  Note:   Educated to drink fluids within  guidelines and to adequately hydrate, medications administered as ordered, skin turgor, mucus membranes, and respiratory status assess this shift and charted. Patient was included in plan of care. Will continue to monitor and reassess. Problem: Sleep Pattern Disturbance:  Goal: Appears well-rested  Description  Appears well-rested  Note:   Patient sleeping well tonight. Pt turned every 2 hours. Problem: Safety:  Goal: Free from accidental physical injury  Description  Free from accidental physical injury  Note:   No accidental or physical harm has come to pt up to this point of shift. Problem: Daily Care:  Goal: Daily care needs are met  Description  Daily care needs are met  Note:   Pt is assisted with daily care needs by staff.      Problem: Skin Integrity:  Goal: Skin integrity will stabilize  Description  Skin integrity will stabilize  Note:   Skin assessment completed every shift. Pt assessed for incontinence, appropriate barrier cream applied prn. Pt encouraged to turn/rotate every 2 hours. Assistance provided if pt unable to do so themselves. Problem: Discharge Planning:  Goal: Patients continuum of care needs are met  Description  Patients continuum of care needs are met  Note:   Plan are being made to send pt to [facility] when discharged. Problem: Risk for Impaired Skin Integrity  Goal: Tissue integrity - skin and mucous membranes  Description  Structural intactness and normal physiological function of skin and  mucous membranes. Note:   Patient assessed for risk of further skin breakdown. Patient turned every 2 hours and repositioned as needed. Pressure relieved to bony prominences and areas of breakdown. Skin is clean and dry, dressings monitored for saturation. Patient educated on helping preventing skin breakdown and how to help skin heal.       Problem: Falls - Risk of:  Goal: Will remain free from falls  Description  Will remain free from falls  Note:   Fall risk assessment completed . Fall precautions in place, bed/ chair alarm on, side rails 2/4 up, call light in reach, educated pt on calling for assistance when needed, room clear of clutter. Pt verbalized understanding.

## 2019-04-21 NOTE — PROGRESS NOTES
Patient laying in bed. Alert to self only. Sling in place on right arm. Dressing clean, dry, and intact on right leg. Patient has wet cough and rhonchi noted on auscultated. Patient suctioned. Encouraged to cough. Pills given in pudding. Call light and belongings within reach. Bed alarm on. All needs met at this time.

## 2019-04-21 NOTE — PROGRESS NOTES
Patient calm, pleasant. Denies need for pain medication at this time. Rt arm in sling,pt repositioned and supported with pillow. Bed alarm, tele camera activated. Call light in reach. Will continue to monitor.

## 2019-04-21 NOTE — PLAN OF CARE
Problem: Pain:  Goal: Pain level will decrease  Description  Pain level will decrease  4/21/2019 1722 by Bianca Ramachandran RN  Outcome: Ongoing  Note:   Educated patient on pain management. Will assess patients pain level per unit protocol, and provide pain management measures as needed. Electronically signed by Bianca Ramachandran RN on 4/21/2019 at 5:23 PM      Problem: Discharge Planning:  Goal: Discharged to appropriate level of care  Description  Discharged to appropriate level of care  4/21/2019 1722 by Bianca Ramachandran RN  Outcome: Ongoing  Note:   Assessed patients knowledge of discharge. Will continue to work with patient on discharge planning and answer patient questions. Will consult case management and  as necessary. Electronically signed by Bianca Ramachandran RN on 4/21/2019 at 5:23 PM      Problem: Fluid Volume - Deficit:  Goal: Absence of fluid volume deficit signs and symptoms  Description  Absence of fluid volume deficit signs and symptoms  4/21/2019 1722 by Bianca Ramachandran RN  Outcome: Ongoing  Note:   Vitals signs are stable. Intake and output are being recorded, will continue to monitor.  Electronically signed by Bianca Ramachandran RN on 4/21/2019 at 5:23 PM      Problem: Nutrition Deficit:  Goal: Ability to achieve adequate nutritional intake will improve  Description  Ability to achieve adequate nutritional intake will improve  4/21/2019 1722 by Bianca Ramachandran RN  Outcome: Ongoing     Problem: Sleep Pattern Disturbance:  Goal: Appears well-rested  Description  Appears well-rested  4/21/2019 1722 by Bianca Ramachandran RN  Outcome: Ongoing     Problem: Violence - Risk of, Self/Other-Directed:  Goal: Knowledge of developmental care interventions  Description  Absence of violence  4/21/2019 1722 by Bianca Ramachandran RN  Outcome: Ongoing     Problem: Safety:  Goal: Free from accidental physical injury  Description  Free from accidental physical injury  4/21/2019 1722 by Bianca Ramachandran RN  Outcome: Ongoing  Note:   Bed in lowest position, wheels locked, bed/chair exit alarm in place, call light within reach, and non skid footwear on. Walkway free of clutter. Pt alert and oriented and able to make needs known. Pt educated to use call light when needing to get up, and pt utilizes call light to make needs known. Will continue to monitor. Electronically signed by Mook Evans RN on 4/21/2019 at 5:23 PM      Problem: Daily Care:  Goal: Daily care needs are met  Description  Daily care needs are met  4/21/2019 1722 by Mook Evans RN  Outcome: Ongoing  Note:   Patient assessed to determine ability to perform daily needs and ADLs. Patient assisted with any daily needs that were not able to be met individually by patient. Malone encouraged, although patient educated to ask for assistance when needed. Will continue to monitor and assist patient with meeting daily care needs as needed. Electronically signed by Mook Evans RN on 4/21/2019 at 5:24 PM      Problem: Skin Integrity:  Goal: Skin integrity will stabilize  Description  Skin integrity will stabilize  4/21/2019 1722 by Mook Evans RN  Outcome: Ongoing  Note:   Will monitor skin and mucous membranes. Will turn patient every 2 hours, monitor for friction and sheering, and change dressings as needed. Will preform skin assessment every shift. Electronically signed by Mook Evans RN on 4/21/2019 at 5:24 PM      Problem: Discharge Planning:  Goal: Patients continuum of care needs are met  Description  Patients continuum of care needs are met  4/21/2019 1722 by Mook Evans RN  Outcome: Ongoing     Problem: Risk for Impaired Skin Integrity  Goal: Tissue integrity - skin and mucous membranes  Description  Structural intactness and normal physiological function of skin and  mucous membranes.   4/21/2019 1722 by Mook Evans RN  Outcome: Ongoing     Problem: Falls - Risk of:  Goal: Will remain free from falls  Description  Will remain free from falls  4/21/2019 1722 by Lyudmila aPlumbo RN  Outcome: Ongoing  Note:   Fall risk assessment completed. Fall precautions in place. Call light within reach. Pt educated on calling for assistance before getting up. Walkway free of clutter. Will continue to monitor.   Electronically signed by Lyudmila Palumbo RN on 4/21/2019 at 5:24 PM      Problem: Nutrition  Goal: Optimal nutrition therapy  Outcome: Ongoing

## 2019-04-21 NOTE — PROGRESS NOTES
Progress Note  Admit Date: 2019      PCP: Zen Chamorro MD     CC: F/U for fall    SUBJECTIVE / Interval History:  Better confused . Searching for a lost wallet  Was confused yest evening and needed ativan   Had a run of SVT in am       Allergies  Patient has no known allergies. Medications    Scheduled Meds:   chlordiazePOXIDE  5 mg Oral 4x Daily    amoxicillin-clavulanate  1 tablet Oral 2 times per day    senna  1 tablet Oral Nightly    thiamine  100 mg Oral Daily    folic acid  1 mg Oral Daily    sodium chloride flush  10 mL Intravenous 2 times per day    docusate sodium  100 mg Oral BID    enoxaparin  40 mg Subcutaneous Nightly    ipratropium-albuterol  1 ampule Inhalation Q4H WA    azithromycin  250 mg Oral Daily    famotidine  20 mg Oral BID    phenytoin  200 mg Oral BID    tamsulosin  0.4 mg Oral Daily    gabapentin  300 mg Oral TID     Continuous Infusions:      PRN Meds:  sodium chloride flush, ondansetron, LORazepam **OR** LORazepam **OR** LORazepam **OR** LORazepam **OR** LORazepam **OR** LORazepam **OR** LORazepam **OR** LORazepam, magnesium hydroxide, acetaminophen, cyclobenzaprine, oxyCODONE-acetaminophen **OR** oxyCODONE-acetaminophen, morphine **OR** morphine    Vitals    TEMPERATURE:  Current - Temp: 98.6 °F (37 °C); Max - Temp  Av.7 °F (37.1 °C)  Min: 98 °F (36.7 °C)  Max: 99.5 °F (37.5 °C)  RESPIRATIONS RANGE: Resp  Av.7  Min: 16  Max: 20  PULSE RANGE: Pulse  Av.2  Min: 101  Max: 111  BLOOD PRESSURE RANGE:  Systolic (95PCF), MCL:257 , Min:112 , GNZ:887   ; Diastolic (22UCA), HC, Min:56, Max:66    PULSE OXIMETRY RANGE: SpO2  Av.8 %  Min: 92 %  Max: 96 %  24HR INTAKE/OUTPUT:      Intake/Output Summary (Last 24 hours) at 2019 0757  Last data filed at 2019 2200  Gross per 24 hour   Intake 460 ml   Output 500 ml   Net -40 ml       Exam:    Gen: No distress. Eyes: PERRL. No sclera icterus. No conjunctival injection. ENT: No discharge. Pharynx clear. External appearance of ears and nose normal.  Neck: Trachea midline. No obvious mass. Resp: No accessory muscle use. No crackles. No wheezes. Few rhonchi. No dullness on percussion. CV: Regular rate. Regular rhythm. No murmur or rub. No edema. GI: Non-tender. Non-distended. No hernia. Skin: Warm, dry, normal texture and turgor. Rash + L arm. Dressing hip +   Lymph: No cervical LAD. No supraclavicular LAD. M/S: No cyanosis. No clubbing. R arm in sling, leg movement limited    Neuro: Moves all four extremities. CN 2-12 tested, no defect noted. Psych: Oriented x 1. Lethargic +    Data    LABS  CBC:   Recent Labs     04/19/19  0531   WBC 9.3   HGB 8.0*   HCT 23.4*   .8*        BMP:   No results for input(s): NA, K, CL, CO2, PHOS, BUN, CREATININE in the last 72 hours. Invalid input(s): CA  POC GLUCOSE:  No results for input(s): POCGLU in the last 72 hours. LIVER PROFILE:   No results for input(s): AST, ALT, LIPASE, AMYLASE, LABALBU, BILIDIR, BILITOT, ALKPHOS in the last 72 hours. PT/INR:   No results for input(s): PROTIME, INR in the last 72 hours. APTT:   No results for input(s): APTT in the last 72 hours. UA:No results for input(s): NITRITE, COLORU, PHUR, LABCAST, WBCUA, RBCUA, MUCUS, TRICHOMONAS, YEAST, BACTERIA, CLARITYU, SPECGRAV, LEUKOCYTESUR, UROBILINOGEN, BILIRUBINUR, BLOODU, GLUCOSEU, KETUA, AMORPHOUS in the last 72 hours. Microbiology:  Wound Culture: No results for input(s): WNDABS, ORG in the last 72 hours. Invalid input(s):  LABGRAM  Nasal Culture: No results for input(s): ORG, MRSAPCR in the last 72 hours. Blood Culture: No results for input(s): BC, BLOODCULT2 in the last 72 hours. Fungal Culture:   No results for input(s): FUNGSM in the last 72 hours. No results for input(s): FUNCXBLD in the last 72 hours.   CSF Culture:  No results for input(s): COLORCSF, APPEARCSF, CFTUBE, CLOTCSF, WBCCSF, RBCCSF, NEUTCSF, NUMCELLSCSF, LYMPHSCSF, MONOCSF, GLUCCSF, Problem:    Right femoral fracture [facility])  Active Problems:    Fall    Right humeral fracture    Alcohol intoxication ([facility] [address]    Hypomagnesemia    Epilepsia ([facility] [address]    Closed fracture of neck of right femur (HCC)    Alcohol dependence ([facility] [address]    Multiple rib fractures    Syncope    Shortness of breath    Bronchitis    Tobacco abuse    ETOH abuse  Resolved Problems:    * No resolved hospital problems. *    70 y.o. male with a past medical history of alcohol dependence and withdrawal seizures  who presented to [facility] after he had a mechanical fall. Patient was intoxicated on admission. He was admitted with a right femoral shaft fracture along with humerus fracture and rib fracture. Patient underwent ORIF on 4/16/2019. Hospital course was complicated by delirium tremens and acute blood loss anemia along with bronchitis and possible aspiration pneumonia. Confused, delirious- treat as DT- slightly better ( still needing ativan )   -  sec to alcohol withdrawal  - start CIWA  -scheduled librium     Right femoral fracture shaft and subtrochanteric  - ortho consulted  - s/p ORIF on 4/16/19     Acute blood loss anemia- hb stable  - monitor   - sec to surgery     Leucocytosis- resolved   - ? Reactive   - monitor     Humeral fracture non displaced    pain management, per orthopedic. R rib fracture  - pain control  -incentive spirometry     Alcohol dependence with abuse  - CIWA   - banana bag      History of epilepsy.  -ct home meds     Hypomagnesemia, replaced. Cough with yellow sputum- treat as bronchitis/ aspiration pneumonia    - duo neb  -ssi  -add unasyn to cover aspiration . Stop tomorrow  -procal elevated         DVT Prophylaxis: lovenox  Diet: Dietary Nutrition Supplements: Standard High Calorie Oral Supplement  DIET GENERAL; Dysphagia III Advanced;  No Drinking Straw  Code Status: Full Code    PT/OT Eval Status:post surgery    Discharge plan - pending recovery from withdrawal ,hopefully tomorrow    The patient and / or the family were informed of the results of any tests, a time was given to answer questions, a plan was proposed and they agreed with plan. Discussed with consulting physicians, nursing and social work     The note was completed using EMR. Every effort was made to ensure accuracy; however, inadvertent computerized transcription errors may be present.        Lizzy Pereira MD

## 2019-04-22 ENCOUNTER — APPOINTMENT (OUTPATIENT)
Dept: GENERAL RADIOLOGY | Age: 64
DRG: 871 | End: 2019-04-22
Payer: MEDICARE

## 2019-04-22 ENCOUNTER — HOSPITAL ENCOUNTER (INPATIENT)
Age: 64
LOS: 14 days | Discharge: NURSING, REHAB OR LONG-TERM CARE | DRG: 871 | End: 2019-05-07
Attending: EMERGENCY MEDICINE | Admitting: INTERNAL MEDICINE
Payer: MEDICARE

## 2019-04-22 ENCOUNTER — APPOINTMENT (OUTPATIENT)
Dept: CT IMAGING | Age: 64
DRG: 871 | End: 2019-04-22
Payer: MEDICARE

## 2019-04-22 VITALS
SYSTOLIC BLOOD PRESSURE: 136 MMHG | WEIGHT: 182.76 LBS | RESPIRATION RATE: 16 BRPM | HEART RATE: 105 BPM | OXYGEN SATURATION: 92 % | DIASTOLIC BLOOD PRESSURE: 58 MMHG | BODY MASS INDEX: 27.07 KG/M2 | TEMPERATURE: 99 F | HEIGHT: 69 IN

## 2019-04-22 DIAGNOSIS — S42.201A CLOSED FRACTURE OF PROXIMAL END OF RIGHT HUMERUS, UNSPECIFIED FRACTURE MORPHOLOGY, INITIAL ENCOUNTER: ICD-10-CM

## 2019-04-22 DIAGNOSIS — F10.231 ALCOHOL DEPENDENCE WITH WITHDRAWAL DELIRIUM (HCC): ICD-10-CM
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
## 2019-04-22 DIAGNOSIS — D72.823 LEUKEMOID REACTION: ICD-10-CM

## 2019-04-22 DIAGNOSIS — L03.114 CELLULITIS OF LEFT UPPER EXTREMITY: ICD-10-CM

## 2019-04-22 DIAGNOSIS — A41.9 SEPTICEMIA (HCC): Primary | ICD-10-CM

## 2019-04-22 DIAGNOSIS — E83.42 HYPOMAGNESEMIA: ICD-10-CM

## 2019-04-22 DIAGNOSIS — E87.6 HYPOKALEMIA: ICD-10-CM

## 2019-04-22 DIAGNOSIS — S72.121A CLOSED DISPLACED FRACTURE OF LESSER TROCHANTER OF RIGHT FEMUR, INITIAL ENCOUNTER (HCC): ICD-10-CM

## 2019-04-22 DIAGNOSIS — J18.9 HAP (HOSPITAL-ACQUIRED PNEUMONIA): ICD-10-CM

## 2019-04-22 LAB
ACETAMINOPHEN LEVEL: <5 UG/ML (ref 10–30)
ALBUMIN SERPL-MCNC: 1.9 G/DL (ref 3.4–5)
ALP BLD-CCNC: 83 U/L (ref 40–129)
ALT SERPL-CCNC: 17 U/L (ref 10–40)
AMMONIA: 37 UMOL/L (ref 16–60)
AMPHETAMINE SCREEN, URINE: ABNORMAL
ANION GAP SERPL CALCULATED.3IONS-SCNC: 10 MMOL/L (ref 3–16)
ANISOCYTOSIS: ABNORMAL
AST SERPL-CCNC: 20 U/L (ref 15–37)
BACTERIA: ABNORMAL /HPF
BANDED NEUTROPHILS RELATIVE PERCENT: 12 % (ref 0–7)
BARBITURATE SCREEN URINE: ABNORMAL
BASOPHILS ABSOLUTE: 0 K/UL (ref 0–0.2)
BASOPHILS RELATIVE PERCENT: 0 %
BENZODIAZEPINE SCREEN, URINE: POSITIVE
BILIRUB SERPL-MCNC: 0.5 MG/DL (ref 0–1)
BILIRUBIN DIRECT: <0.2 MG/DL (ref 0–0.3)
BILIRUBIN URINE: ABNORMAL
BILIRUBIN, INDIRECT: ABNORMAL MG/DL (ref 0–1)
BLOOD, URINE: NEGATIVE
BUN BLDV-MCNC: 5 MG/DL (ref 7–20)
CALCIUM SERPL-MCNC: 7.8 MG/DL (ref 8.3–10.6)
CANNABINOID SCREEN URINE: ABNORMAL
CHLORIDE BLD-SCNC: 105 MMOL/L (ref 99–110)
CLARITY: CLEAR
CO2: 30 MMOL/L (ref 21–32)
COCAINE METABOLITE SCREEN URINE: ABNORMAL
COLOR: ABNORMAL
CREAT SERPL-MCNC: 0.5 MG/DL (ref 0.8–1.3)
EOSINOPHILS ABSOLUTE: 0 K/UL (ref 0–0.6)
EOSINOPHILS RELATIVE PERCENT: 0 %
EPITHELIAL CELLS, UA: ABNORMAL /HPF
ETHANOL: NORMAL MG/DL (ref 0–0.08)
GFR AFRICAN AMERICAN: >60
GFR NON-AFRICAN AMERICAN: >60
GLUCOSE BLD-MCNC: 132 MG/DL (ref 70–99)
GLUCOSE URINE: NEGATIVE MG/DL
HCT VFR BLD CALC: 25.2 % (ref 40.5–52.5)
HEMOGLOBIN: 8.4 G/DL (ref 13.5–17.5)
KETONES, URINE: 15 MG/DL
LACTIC ACID: 1.3 MMOL/L (ref 0.4–2)
LEUKOCYTE ESTERASE, URINE: NEGATIVE
LYMPHOCYTES ABSOLUTE: 0.6 K/UL (ref 1–5.1)
LYMPHOCYTES RELATIVE PERCENT: 4 %
Lab: ABNORMAL
MACROCYTES: ABNORMAL
MAGNESIUM: 1.4 MG/DL (ref 1.8–2.4)
MCH RBC QN AUTO: 34.6 PG (ref 26–34)
MCHC RBC AUTO-ENTMCNC: 33.5 G/DL (ref 31–36)
MCV RBC AUTO: 103.3 FL (ref 80–100)
METHADONE SCREEN, URINE: ABNORMAL
MICROSCOPIC EXAMINATION: YES
MONOCYTES ABSOLUTE: 1 K/UL (ref 0–1.3)
MONOCYTES RELATIVE PERCENT: 7 %
NEUTROPHILS ABSOLUTE: 12.5 K/UL (ref 1.7–7.7)
NEUTROPHILS RELATIVE PERCENT: 77 %
NITRITE, URINE: NEGATIVE
OPIATE SCREEN URINE: ABNORMAL
OXYCODONE URINE: ABNORMAL
PDW BLD-RTO: 13.9 % (ref 12.4–15.4)
PH UA: 7
PH UA: 7 (ref 5–8)
PHENCYCLIDINE SCREEN URINE: ABNORMAL
PLATELET # BLD: 287 K/UL (ref 135–450)
PMV BLD AUTO: 9.8 FL (ref 5–10.5)
POTASSIUM REFLEX MAGNESIUM: 2.6 MMOL/L (ref 3.5–5.1)
PRO-BNP: 1066 PG/ML (ref 0–124)
PROPOXYPHENE SCREEN: ABNORMAL
PROTEIN UA: ABNORMAL MG/DL
RBC # BLD: 2.44 M/UL (ref 4.2–5.9)
RBC UA: ABNORMAL /HPF (ref 0–2)
SALICYLATE, SERUM: <0.3 MG/DL (ref 15–30)
SLIDE REVIEW: ABNORMAL
SODIUM BLD-SCNC: 145 MMOL/L (ref 136–145)
SPECIFIC GRAVITY UA: 1.02 (ref 1–1.03)
TOTAL PROTEIN: 5.7 G/DL (ref 6.4–8.2)
TROPONIN: <0.01 NG/ML
URINE REFLEX TO CULTURE: ABNORMAL
URINE TYPE: ABNORMAL
UROBILINOGEN, URINE: 1 E.U./DL
WBC # BLD: 14.1 K/UL (ref 4–11)
WBC UA: ABNORMAL /HPF (ref 0–5)

## 2019-04-22 PROCEDURE — 71045 X-RAY EXAM CHEST 1 VIEW: CPT

## 2019-04-22 PROCEDURE — 2580000003 HC RX 258: Performed by: EMERGENCY MEDICINE

## 2019-04-22 PROCEDURE — 93005 ELECTROCARDIOGRAM TRACING: CPT | Performed by: EMERGENCY MEDICINE

## 2019-04-22 PROCEDURE — 6370000000 HC RX 637 (ALT 250 FOR IP): Performed by: ORTHOPAEDIC SURGERY

## 2019-04-22 PROCEDURE — 6370000000 HC RX 637 (ALT 250 FOR IP): Performed by: EMERGENCY MEDICINE

## 2019-04-22 PROCEDURE — 94668 MNPJ CHEST WALL SBSQ: CPT

## 2019-04-22 PROCEDURE — 94760 N-INVAS EAR/PLS OXIMETRY 1: CPT

## 2019-04-22 PROCEDURE — G0480 DRUG TEST DEF 1-7 CLASSES: HCPCS

## 2019-04-22 PROCEDURE — 2580000003 HC RX 258: Performed by: ORTHOPAEDIC SURGERY

## 2019-04-22 PROCEDURE — 6360000002 HC RX W HCPCS: Performed by: EMERGENCY MEDICINE

## 2019-04-22 PROCEDURE — 70450 CT HEAD/BRAIN W/O DYE: CPT

## 2019-04-22 PROCEDURE — 83605 ASSAY OF LACTIC ACID: CPT

## 2019-04-22 PROCEDURE — 80307 DRUG TEST PRSMV CHEM ANLYZR: CPT

## 2019-04-22 PROCEDURE — 71275 CT ANGIOGRAPHY CHEST: CPT

## 2019-04-22 PROCEDURE — 94640 AIRWAY INHALATION TREATMENT: CPT

## 2019-04-22 PROCEDURE — 96365 THER/PROPH/DIAG IV INF INIT: CPT

## 2019-04-22 PROCEDURE — 99291 CRITICAL CARE FIRST HOUR: CPT

## 2019-04-22 PROCEDURE — 96375 TX/PRO/DX INJ NEW DRUG ADDON: CPT

## 2019-04-22 PROCEDURE — 82140 ASSAY OF AMMONIA: CPT

## 2019-04-22 PROCEDURE — 83880 ASSAY OF NATRIURETIC PEPTIDE: CPT

## 2019-04-22 PROCEDURE — 87040 BLOOD CULTURE FOR BACTERIA: CPT

## 2019-04-22 PROCEDURE — 84145 PROCALCITONIN (PCT): CPT

## 2019-04-22 PROCEDURE — 80076 HEPATIC FUNCTION PANEL: CPT

## 2019-04-22 PROCEDURE — 36415 COLL VENOUS BLD VENIPUNCTURE: CPT

## 2019-04-22 PROCEDURE — 51702 INSERT TEMP BLADDER CATH: CPT

## 2019-04-22 PROCEDURE — 85025 COMPLETE CBC W/AUTO DIFF WBC: CPT

## 2019-04-22 PROCEDURE — 6360000004 HC RX CONTRAST MEDICATION: Performed by: EMERGENCY MEDICINE

## 2019-04-22 PROCEDURE — 92526 ORAL FUNCTION THERAPY: CPT

## 2019-04-22 PROCEDURE — 81001 URINALYSIS AUTO W/SCOPE: CPT

## 2019-04-22 PROCEDURE — 80048 BASIC METABOLIC PNL TOTAL CA: CPT

## 2019-04-22 PROCEDURE — 6370000000 HC RX 637 (ALT 250 FOR IP): Performed by: INTERNAL MEDICINE

## 2019-04-22 PROCEDURE — 84484 ASSAY OF TROPONIN QUANT: CPT

## 2019-04-22 PROCEDURE — 83735 ASSAY OF MAGNESIUM: CPT

## 2019-04-22 RX ORDER — MAGNESIUM SULFATE IN WATER 40 MG/ML
2 INJECTION, SOLUTION INTRAVENOUS ONCE
Status: COMPLETED | OUTPATIENT
Start: 2019-04-22 | End: 2019-04-23

## 2019-04-22 RX ORDER — POTASSIUM CHLORIDE 750 MG/1
40 TABLET, FILM COATED, EXTENDED RELEASE ORAL ONCE
Status: DISCONTINUED | OUTPATIENT
Start: 2019-04-22 | End: 2019-04-23

## 2019-04-22 RX ORDER — CYCLOBENZAPRINE HCL 10 MG
10 TABLET ORAL 3 TIMES DAILY PRN
Qty: 30 TABLET | Refills: 0 | Status: ON HOLD | OUTPATIENT
Start: 2019-04-22 | End: 2019-05-07

## 2019-04-22 RX ORDER — LORAZEPAM 2 MG/ML
2 INJECTION INTRAMUSCULAR ONCE
Status: COMPLETED | OUTPATIENT
Start: 2019-04-22 | End: 2019-04-22

## 2019-04-22 RX ORDER — SENNA PLUS 8.6 MG/1
1 TABLET ORAL NIGHTLY
Qty: 30 TABLET | Refills: 0 | Status: SHIPPED | OUTPATIENT
Start: 2019-04-22 | End: 2019-05-22

## 2019-04-22 RX ORDER — SODIUM CHLORIDE 0.9 % (FLUSH) 0.9 %
10 SYRINGE (ML) INJECTION PRN
Status: DISCONTINUED | OUTPATIENT
Start: 2019-04-22 | End: 2019-04-23

## 2019-04-22 RX ORDER — FOLIC ACID 1 MG/1
1 TABLET ORAL DAILY
Qty: 30 TABLET | Refills: 3 | Status: SHIPPED | OUTPATIENT
Start: 2019-04-22

## 2019-04-22 RX ORDER — 0.9 % SODIUM CHLORIDE 0.9 %
1000 INTRAVENOUS SOLUTION INTRAVENOUS ONCE
Status: DISCONTINUED | OUTPATIENT
Start: 2019-04-22 | End: 2019-04-23

## 2019-04-22 RX ORDER — 0.9 % SODIUM CHLORIDE 0.9 %
1000 INTRAVENOUS SOLUTION INTRAVENOUS ONCE
Status: COMPLETED | OUTPATIENT
Start: 2019-04-22 | End: 2019-04-23

## 2019-04-22 RX ORDER — POTASSIUM CHLORIDE 7.45 MG/ML
10 INJECTION INTRAVENOUS
Status: DISPENSED | OUTPATIENT
Start: 2019-04-22 | End: 2019-04-23

## 2019-04-22 RX ORDER — SODIUM CHLORIDE 0.9 % (FLUSH) 0.9 %
10 SYRINGE (ML) INJECTION EVERY 12 HOURS SCHEDULED
Status: DISCONTINUED | OUTPATIENT
Start: 2019-04-22 | End: 2019-04-23

## 2019-04-22 RX ORDER — CHLORDIAZEPOXIDE HYDROCHLORIDE 5 MG/1
5 CAPSULE, GELATIN COATED ORAL 3 TIMES DAILY PRN
Qty: 9 CAPSULE | Refills: 0 | Status: ON HOLD | OUTPATIENT
Start: 2019-04-22 | End: 2019-05-07

## 2019-04-22 RX ORDER — IPRATROPIUM BROMIDE AND ALBUTEROL SULFATE 2.5; .5 MG/3ML; MG/3ML
3 SOLUTION RESPIRATORY (INHALATION)
Qty: 360 ML | Refills: 0 | Status: SHIPPED | OUTPATIENT
Start: 2019-04-22

## 2019-04-22 RX ORDER — AZITHROMYCIN 250 MG/1
250 TABLET, FILM COATED ORAL DAILY
Qty: 3 TABLET | Refills: 0 | Status: ON HOLD | OUTPATIENT
Start: 2019-04-22 | End: 2019-05-07 | Stop reason: HOSPADM

## 2019-04-22 RX ORDER — LANOLIN ALCOHOL/MO/W.PET/CERES
100 CREAM (GRAM) TOPICAL DAILY
Qty: 30 TABLET | Refills: 3 | Status: SHIPPED | OUTPATIENT
Start: 2019-04-22

## 2019-04-22 RX ORDER — PSEUDOEPHEDRINE HCL 30 MG
100 TABLET ORAL 2 TIMES DAILY
Qty: 30 CAPSULE | Refills: 0 | Status: SHIPPED | OUTPATIENT
Start: 2019-04-22

## 2019-04-22 RX ADMIN — CYCLOBENZAPRINE HYDROCHLORIDE 10 MG: 10 TABLET, FILM COATED ORAL at 12:21

## 2019-04-22 RX ADMIN — GABAPENTIN 300 MG: 300 CAPSULE ORAL at 16:55

## 2019-04-22 RX ADMIN — TAMSULOSIN HYDROCHLORIDE 0.4 MG: 0.4 CAPSULE ORAL at 12:21

## 2019-04-22 RX ADMIN — IPRATROPIUM BROMIDE AND ALBUTEROL SULFATE 1 AMPULE: .5; 3 SOLUTION RESPIRATORY (INHALATION) at 16:28

## 2019-04-22 RX ADMIN — FOLIC ACID 1 MG: 1 TABLET ORAL at 12:21

## 2019-04-22 RX ADMIN — IPRATROPIUM BROMIDE AND ALBUTEROL SULFATE 1 AMPULE: .5; 3 SOLUTION RESPIRATORY (INHALATION) at 08:17

## 2019-04-22 RX ADMIN — PHENYTOIN SODIUM 200 MG: 100 CAPSULE ORAL at 12:21

## 2019-04-22 RX ADMIN — AZITHROMYCIN 250 MG: 250 TABLET, FILM COATED ORAL at 12:21

## 2019-04-22 RX ADMIN — LORAZEPAM 2 MG: 2 INJECTION INTRAMUSCULAR; INTRAVENOUS at 22:43

## 2019-04-22 RX ADMIN — VANCOMYCIN HYDROCHLORIDE 1000 MG: 1 INJECTION, POWDER, LYOPHILIZED, FOR SOLUTION INTRAVENOUS at 22:10

## 2019-04-22 RX ADMIN — IOPAMIDOL 75 ML: 755 INJECTION, SOLUTION INTRAVENOUS at 23:53

## 2019-04-22 RX ADMIN — IBUPROFEN 600 MG: 400 TABLET ORAL at 22:11

## 2019-04-22 RX ADMIN — IPRATROPIUM BROMIDE AND ALBUTEROL SULFATE 1 AMPULE: .5; 3 SOLUTION RESPIRATORY (INHALATION) at 11:44

## 2019-04-22 RX ADMIN — GABAPENTIN 300 MG: 300 CAPSULE ORAL at 12:21

## 2019-04-22 RX ADMIN — Medication 100 MG: at 12:21

## 2019-04-22 RX ADMIN — FAMOTIDINE 20 MG: 20 TABLET ORAL at 12:21

## 2019-04-22 RX ADMIN — Medication 10 ML: at 12:22

## 2019-04-22 ASSESSMENT — ENCOUNTER SYMPTOMS
SHORTNESS OF BREATH: 1
WHEEZING: 0
EYE REDNESS: 0
ABDOMINAL DISTENTION: 0
ANAL BLEEDING: 0
CHOKING: 0
RECTAL PAIN: 0
DIARRHEA: 0
EYE ITCHING: 0
CHEST TIGHTNESS: 0
STRIDOR: 0
ABDOMINAL PAIN: 0
COLOR CHANGE: 1
PHOTOPHOBIA: 0
VOMITING: 0
APNEA: 0
COUGH: 1
EYE PAIN: 0
BACK PAIN: 0
NAUSEA: 0
BLOOD IN STOOL: 0
EYE DISCHARGE: 0
CONSTIPATION: 0

## 2019-04-22 ASSESSMENT — PAIN SCALES - GENERAL
PAINLEVEL_OUTOF10: 0

## 2019-04-22 NOTE — PROGRESS NOTES
Patient alert and oriented with intermittent confusion. Suctioning patient as needed. Encouraging patient to cough to get up secretions. Patient refuses to cough and suction at times. No Ativan needed during shift. Pain tolerable. Call light and belongings within reach. Bed alarm on.

## 2019-04-22 NOTE — PLAN OF CARE
Problem: Pain:  Goal: Pain level will decrease  Description  Pain level will decrease  4/22/2019 0127 by Idania Rendon RN  Outcome: Ongoing  Note:   Pain/discomfort being managed with PRN analgesics per MD orders. Pt able to express presence and absence of pain and rate pain appropriately using numerical scale. 4/21/2019 1722 by Grecia Lopez RN  Outcome: Ongoing  Note:   Educated patient on pain management. Will assess patients pain level per unit protocol, and provide pain management measures as needed. Electronically signed by Grecia Lopez RN on 4/21/2019 at 5:23 PM   Goal: Control of acute pain  Description  Control of acute pain  Outcome: Ongoing  Note:   Patient educated on acute pain. Taught patient to use call light to ask for pain medication. PRN pain medication given for acute pain. Will continue to monitor pain per unit protocol. Goal: Control of chronic pain  Description  Control of chronic pain  Outcome: Ongoing  Note:   Patient educated on chronic pain. Taught patient to use call light to ask for pain medication. PRN pain medication given for chronic pain. Will continue to monitor pain per unit protocol. Goal: Patient's pain/discomfort is manageable  Description  Patient's pain/discomfort is manageable  Outcome: Ongoing  Note:   Pain/discomfort being managed with PRN analgesics per MD orders. Pt able to express presence and absence of pain and rate pain appropriately using numerical scale. Problem: Discharge Planning:  Goal: Discharged to appropriate level of care  Description  Discharged to appropriate level of care  4/22/2019 0127 by Idania Rendon RN  Outcome: Ongoing  Note:   Plan to discharge patient to [facility] this morning  4/21/2019 1722 by Grecia Lopez RN  Outcome: Ongoing  Note:   Assessed patients knowledge of discharge. Will continue to work with patient on discharge planning and answer patient questions.  Will consult case management and social services as necessary. Electronically signed by Caitlin Francois RN on 4/21/2019 at 5:23 PM      Problem: Fluid Volume - Deficit:  Goal: Absence of fluid volume deficit signs and symptoms  Description  Absence of fluid volume deficit signs and symptoms  4/22/2019 0127 by Diane Clarke RN  Outcome: Ongoing  Note:   No fluid volume deficit noticed at this time. 4/21/2019 1722 by Caitlin Francois RN  Outcome: Ongoing  Note:   Vitals signs are stable. Intake and output are being recorded, will continue to monitor. Electronically signed by Caitlin Francois RN on 4/21/2019 at 5:23 PM      Problem: Nutrition Deficit:  Goal: Ability to achieve adequate nutritional intake will improve  Description  Ability to achieve adequate nutritional intake will improve  4/22/2019 0127 by Diane Clarke RN  Outcome: Ongoing  Note:   Patient educated on proper nutrition. Patients intake monitored and patient assessed to make sure no assistance with eating was required. Patient encouraged to eat a well balanced diet. 4/21/2019 1722 by Caitlin Francois RN  Outcome: Ongoing     Problem: Sleep Pattern Disturbance:  Goal: Appears well-rested  Description  Appears well-rested  4/22/2019 0127 by Diane Clarke RN  Outcome: Ongoing  Note:   Sleeping well at this time  4/21/2019 1722 by Caitlin Francois RN  Outcome: Ongoing     Problem: Violence - Risk of, Self/Other-Directed:  Goal: Knowledge of developmental care interventions  Description  Absence of violence  4/22/2019 0127 by Diane Clarke RN  Outcome: Ongoing  4/21/2019 1722 by Caitlin Francois RN  Outcome: Ongoing     Problem: Safety:  Goal: Free from accidental physical injury  Description  Free from accidental physical injury  4/22/2019 0127 by Diane Clarke RN  Outcome: Ongoing  Note:   Pt is free of injury. No injury noted. Fall precautions in place. Call light within reach. Will monitor.     4/21/2019 1722 by Caitlin Francois RN  Outcome: Ongoing  Note:   Bed in lowest position, wheels locked, bed/chair exit alarm in place, call light within reach, and non skid footwear on. Walkway free of clutter. Pt alert and oriented and able to make needs known. Pt educated to use call light when needing to get up, and pt utilizes call light to make needs known. Will continue to monitor. Electronically signed by Benigno Villafana RN on 4/21/2019 at 5:23 PM   Goal: Free from intentional harm  Description  Free from intentional harm  Outcome: Ongoing  Note:   Pt is free of intentional harm. No intentions noted. Will monitor. Problem: Daily Care:  Goal: Daily care needs are met  Description  Daily care needs are met  4/22/2019 0127 by Ricco Wu RN  Outcome: Ongoing  Note:   Checking on patient Q2H for nutrition needs, hygiene needs, comfort measures, mobility, fall risk interventions, and safe environment. All precautions and interventions in place. Educated patient on use of call light and telephone. Call light/telephone in reach. Will continue to monitor and assess. 4/21/2019 1722 by Benigno Villafana RN  Outcome: Ongoing  Note:   Patient assessed to determine ability to perform daily needs and ADLs. Patient assisted with any daily needs that were not able to be met individually by patient. [place] encouraged, although patient educated to ask for assistance when needed. Will continue to monitor and assist patient with meeting daily care needs as needed. Electronically signed by Benigno Villafana RN on 4/21/2019 at 5:24 PM      Problem: Skin Integrity:  Goal: Skin integrity will stabilize  Description  Skin integrity will stabilize  4/22/2019 0127 by Ricco uW RN  Outcome: Ongoing  Note:   Skin assessment completed every shift. Pt assessed for incontinence, appropriate barrier cream applied prn. Pt encouraged to turn/rotate every 2 hours. Assistance provided if pt unable to do so themselves.      4/21/2019 1722 by Benigno Villafana RN  Outcome: Ongoing  Note: Will monitor skin and mucous membranes. Will turn patient every 2 hours, monitor for friction and sheering, and change dressings as needed. Will preform skin assessment every shift. Electronically signed by Kris Chou RN on 4/21/2019 at 5:24 PM      Problem: Discharge Planning:  Goal: Patients continuum of care needs are met  Description  Patients continuum of care needs are met  4/22/2019 0127 by Crissy Russell RN  Outcome: Ongoing  Note:   Patient educated and assessed to determine that needs are being met. Questions answered for patient. Patient encouraged to ask questions and voice concerns/comments in regards to barriers for discharge and any other questions about the plan of care. 4/21/2019 1722 by Kris Chou RN  Outcome: Ongoing     Problem: Risk for Impaired Skin Integrity  Goal: Tissue integrity - skin and mucous membranes  Description  Structural intactness and normal physiological function of skin and  mucous membranes. 4/22/2019 0127 by Crissy Russell RN  Outcome: Ongoing  Note:   Skin assessment completed every shift. Pt assessed for incontinence, appropriate barrier cream applied prn. Pt encouraged to turn/rotate every 2 hours. Assistance provided if pt unable to do so themselves. 4/21/2019 1722 by Kris Chou RN  Outcome: Ongoing     Problem: Falls - Risk of:  Goal: Will remain free from falls  Description  Will remain free from falls  4/22/2019 0127 by Crissy Russell RN  Outcome: Ongoing  Note:   Fall risk assessment completed . Fall precautions in place, bed/ chair alarm on, side rails 2/4 up, call light in reach, educated pt on calling for assistance when needed, room clear of clutter. Pt verbalized understanding.    4/21/2019 1722 by Kris Chou RN  Outcome: Ongoing  Note:   Fall risk assessment completed. Fall precautions in place. Call light within reach. Pt educated on calling for assistance before getting up. Walkway free of clutter.  Will continue to monitor. Electronically signed by Mook Evans RN on 4/21/2019 at 5:24 PM   Goal: Absence of physical injury  Description  Absence of physical injury  Outcome: Ongoing  Note:   Pt is free of injury. No injury noted. Fall precautions in place. Call light within reach. Will monitor.        Problem: Nutrition  Goal: Optimal nutrition therapy  4/22/2019 0127 by Manjit Ramsay RN  Outcome: Ongoing  4/21/2019 1722 by Mook Evans RN  Outcome: Ongoing

## 2019-04-22 NOTE — CARE COORDINATION
Sharda aware of patient's dc today. Plan is for [facility] skilled. Sharda set up [facility] for 6pm . Sharda spoke with patient's brother Alexander Schmitt today regarding dc and confirmed plan for [facility]. Sw informed brother of dc time of 6pm and IMM letter. Brother is agreeable to dc plan and will inform the rest of the family. Sharda spoke with Kuldeep at [facility] they can accept patient [facility] 6pm today[facility]. Rn aware of above. Report number is [id number].     Electronically signed by Lindsay Jefferson on 4/22/2019 at 12:14 PM          .

## 2019-04-22 NOTE — DISCHARGE SUMMARY
Hospital Medicine Discharge Summary      Patient ID: Theresa Orf      Patient's PCP: Zarina Faria MD    Admit Date: 4/14/2019     Discharge Date: 4/22/2019  The patient was seen and examined on day of discharge and this discharge summary is in conjunction with any daily progress note from day of discharge. Admitting Physician: Nya Acosta MD    Discharge Physician: Kati Jones MD     Admitted for   Chief Complaint   Patient presents with    Fall     Fall, +ETOH, right upper leg deformity noted, pt denies LOC, unknown if he hit his head. Also complains of right shoulder pain. PMS intact.  Leg Injury    Shoulder Pain       Admitting Diagnosis Closed fracture of neck of right femur ([address] [S72.001A]    Discharge Diagnoses: Active Hospital Problems    Diagnosis Date Noted    Shortness of breath [R06.02]     Bronchitis [J40]     Tobacco abuse [Z72.0]     ETOH abuse [F10.10]     Syncope [R55]     Fall [W19. XXXA] 04/14/2019    Right humeral fracture [S42.301A] 04/14/2019    Right femoral fracture ([address] [S72.91XA] 04/14/2019    Alcohol intoxication ([address] [F10.929] 04/14/2019    Hypomagnesemia [E83.42] 04/14/2019    Epilepsia ([address] [U53.491] 04/14/2019    Closed fracture of neck of right femur ([address] [S72.001A] 04/14/2019    Alcohol dependence ([address] [F10.20] 04/14/2019    Multiple rib fractures [S22.49XA] 04/14/2019       Follow Up: Primary Care Physician in one week    PCP to Follow up on   Post dc FU           Hospital Course:     70 y. o. male with a past medical history of alcohol dependence and withdrawal seizures  who presented to [facility] he had a mechanical fall. Patient was intoxicated on admission. He was admitted with a right femoral shaft fracture along with humerus fracture and rib fracture. Patient underwent ORIF on 4/16/2019.  Hospital course was complicated by delirium tremens and acute blood loss anemia along with bronchitis and possible aspiration pneumonia.        Confused, delirious- treat as DT- improved, dc with prn librium   -  sec to alcohol withdrawal  - start CIWA        Right femoral fracture shaft and subtrochanteric  - ortho consulted  - s/p ORIF on 4/16/19      Acute blood loss anemia- hb stable  - monitor   - sec to surgery      Leucocytosis- resolved   - ? Reactive   - monitor      Humeral fracture non displaced    pain management, per orthopedic.     R rib fracture  - pain control  -incentive spirometry      Alcohol dependence with abuse  - CIWA   - banana bag       History of epilepsy.  -ct home meds      Hypomagnesemia, replaced.     Cough with yellow sputum- treat as bronchitis/ aspiration pneumonia    - duo neb  -ssi  -add unasyn to cover aspiration . Stop tomorrow  -procal elevated            Consults:     IP CONSULT TO ORTHOPEDIC SURGERY  IP CONSULT TO HOSPITALIST  IP CONSULT TO SOCIAL WORK  IP CONSULT TO CARDIOLOGY  IP CONSULT TO PULMONOLOGY        Disposition: SNF    Discharged Condition: Stable    Code Status: Full Code    Activity: activity as tolerated    Diet: regular diet          Labs:  For convenience and continuity at follow-up the following most recent labs are provided:    CBC:   Lab Results   Component Value Date    WBC 9.3 04/19/2019    HGB 8.0 04/19/2019    HCT 23.4 04/19/2019     04/19/2019       RENAL:   Lab Results   Component Value Date     04/17/2019    K 3.7 04/17/2019    K 4.1 04/15/2019     04/17/2019    CO2 29 04/17/2019    BUN 8 04/17/2019    CREATININE <0.5 04/17/2019           Discharge Medications:    Lexi Sweet   [place] Medication Instructions [id number]    Printed on:04/22/19 1237   Medication Information                      aspirin 325 MG EC tablet  Take 1 tablet by mouth daily for 14 days Begin after Lovenox dosing completed for DVT prophylaxis             azithromycin (ZITHROMAX) 250 MG tablet  Take 1 tablet by mouth daily for 3 days             chlordiazePOXIDE (LIBRIUM) 5 MG capsule  Take 1 capsule by mouth 3 times daily as needed for Anxiety for up to 3 days. cyclobenzaprine (FLEXERIL) 10 MG tablet  Take 1 tablet by mouth 3 times daily as needed for Muscle spasms             docusate sodium (COLACE, DULCOLAX) 100 MG CAPS  Take 100 mg by mouth 2 times daily             doxazosin (CARDURA) 4 MG tablet  Take 4 mg by mouth nightly             enoxaparin (LOVENOX) 40 MG/0.4ML injection  Inject 0.4 mLs into the skin nightly for 14 days             famotidine (PEPCID) 20 MG tablet  Take 1 tablet by mouth 2 times daily             folic acid (FOLVITE) 1 MG tablet  Take 1 tablet by mouth daily             ipratropium-albuterol (DUONEB) 0.5-2.5 (3) MG/3ML SOLN nebulizer solution  Inhale 3 mLs into the lungs every 4 hours (while awake)             oxyCODONE-acetaminophen (PERCOCET) 5-325 MG per tablet  Take 1-2 tablets by mouth every 4 hours as needed for Pain for up to 7 days. phenytoin (DILANTIN) 100 MG ER capsule  Take 200 mg by mouth 2 times daily              senna (SENOKOT) 8.6 MG tablet  Take 1 tablet by mouth nightly             vitamin B-1 100 MG tablet  Take 1 tablet by mouth daily                 No future appointments. Time Spent on discharge is more than 45 minutes in the examination, evaluation, counseling and review of medications and discharge plan. Signed:  Jarrod Baca MD   4/22/2019    The note was completed using EMR. Every effort was made to ensure accuracy; however, inadvertent computerized transcription errors may be present. Thank you Jorge Perdomo MD for the opportunity to be involved in this patient's care. If you have any questions or concerns please feel free to contact me at [phone].

## 2019-04-22 NOTE — PROGRESS NOTES
Patient discharged with belongings via stretcher via [facility]. Gave report to Kavya at facility. Patient given discharge instructions, patient verbalized understanding, no questions at this time. Prescriptions given to patient/family. IV D/Cd patient tolerated well, no complications.

## 2019-04-22 NOTE — PROGRESS NOTES
[facility] Orthopedic Surgery   Progress Note      S/P :  SUBJECTIVE  In bed. Alert, oriented to self. Able to make needs known, Drank small amt of milk. Pain is   described in right hip and right shoulder and with the intensity of none at this time. Does not appear in distress. OBJECTIVE              Physical                      VITALS:  BP (!) 151/62   Pulse 106   Temp 98.8 °F (37.1 °C) (Oral)   Resp 18   Ht 5' 9\" (1.753 m)   Wt 182 lb 12.2 oz (82.9 kg)   SpO2 93%   BMI 26.99 kg/m²                     MUSCULOSKELETAL:  right foot NVI. Wiggles toes to command. Pedal pulses are palpable. right hand NVI. Wiggles fingers to command. Radial pulses are palpable. No gross deformity right shoulder Allows right shoulder gentle motion to reapplied arm sling correctly, no complaint of pain in right shoulder with activity.                     NEUROLOGIC:                                  Sensory:  Touch:  Right Lower and Upper  Extremity:  normal                                                 Surgical wound appears clean and dry right hip with Mepilex dressing    Data       CBC:   Lab Results   Component Value Date    WBC 9.3 04/19/2019    RBC 2.28 04/19/2019    HGB 8.0 04/19/2019    HCT 23.4 04/19/2019    .8 04/19/2019    MCH 35.0 04/19/2019    MCHC 34.0 04/19/2019    RDW 13.4 04/19/2019     04/19/2019    MPV 10.0 04/19/2019        WBC:    Lab Results   Component Value Date    WBC 9.3 04/19/2019        Hemoglobin/Hematocrit:    Lab Results   Component Value Date    HGB 8.0 04/19/2019    HCT 23.4 04/19/2019        PT/INR:    Lab Results   Component Value Date    PROTIME 11.6 04/15/2019    INR 1.02 04/15/2019              Current Inpatient Medications             Current Facility-Administered Medications: chlordiazePOXIDE (LIBRIUM) capsule 5 mg, 5 mg, Oral, 4x Daily  senna (SENOKOT) tablet 8.6 mg, 1 tablet, Oral, Nightly  vitamin B-1 (THIAMINE) tablet 100 mg, 100 mg, Oral, Daily  folic acid (FOLVITE) tablet 1 14 days postop then switch to ASA for 14 days as ordered.   PT OT for ADL's and ambulation as tolerated  SS for DC planning,IV or PO pain med as ordered  Stable for DC per ortho, FU in office 2 weeks postop    Megan ChristiansonEdward P. [facility]  4/22/2019  8:55 AM

## 2019-04-22 NOTE — PROGRESS NOTES
mechanical fall. Patient was intoxicated on admission. He was admitted with a right femoral shaft fracture along with humerus fracture and rib fracture. Patient underwent ORIF on 4/16/2019. Hospital course was complicated by delirium tremens and acute blood loss anemia along with bronchitis and possible aspiration pneumonia. Subjective:     Current diet  Current Diet : dysphagia III Mechanically Advanced  Current Liquid Diet : Thin    Comments regarding tolerating Current Diet:   RN reports poor appetite but adequate tolerance      Objective:     Pain   Patient Currently in Pain: Denies    Cognitive/Behavior   Behavior/Cognition: Alert, Confused, Impulsive, Requires cueing, Distractible    Presentations   Consistencies Administered: Reg solid declined by patient; Thin - straw accepted    Positioning   Fully upright in bed with MAX encouragement    PO Trials:  · Thin Liquids: no overt signs/symptoms of penetration/aspiration  · Puree: NA   · Soft food: NA  · Regular food: declined    Dysphagia Tx:   PO trials:  Max cues for PO trial acceptance. Appears to be tolerating thin liquids safely. Patient was noted with wet vocal quality and decreased secretion management upon entry to room which resolved upon administration of liquid trials. Comp strats: dependent  Education: patient initially declining tx. Accepted limited/partial tx upon education on benefit to diet tolerance monitor. Goals:   Dysphagia Goals: The patient will tolerate recommended diet without observed clinical signs of aspiration, continue  The patient will improve oral preparation phase via bolus control/manipulation exercises to 5/5 each trial., not receptive to cueing  The patient/caregiver will demonstrate understanding of compensatory strategies for improved swallowing safety. , dependent  The patient will tolerate regular consistency solids 10/10.  declined    Assessment:   Impressions:   Swallow function suspected comparable to above referenced MBS. Limited tx tolerance this date. Continue diet monitor and attempt direct dysphagia tx as tolerated. Diet Recommendations:  Solid consistency: Dysphagia III Advanced   Liquid consistency: Thin   Medication administration: Meds in puree   Recommended Form of Meds: PO    Strategies:   Compensatory Swallowing Strategies: Small bites/sips, Upright as possible for all oral intake, Eat/Feed slowly, Swallow 2 times per bite/sip, Total feed    Education:  Consulted and agree with results and recommendations: Patient, RN  Patient Education: Attempted on results/recommendations/plan  Patient Education Response: No evidence of learning, Needs reinforcement    Prognosis:   Guarded for safe diet advancement d/t variable cog status    Plan:     Continue Dysphagia Therapy: YES    Interventions: Therapeutic Interventions: Patient/Family education, Therapeutic PO trials with SLP, Diet tolerance monitoring, Bolus control exercises  Duration/Frequency of therapy while on unit: Duration/Frequency of Treatment  Duration/Frequency of Treatment: 3-5x/wk for LOS     Discharge Instructions:   Anticipate NEED for further skilled Speech Therapy for Dysphagia at discharge    This note serves as a D/C Summary in the event that this patient is discharged prior to the next therapy session.     Coded treatment time: 0  Total treatment time: 6    Electronically signed by ISAK Mondragon on 4/22/2019 at 10:46 AM

## 2019-04-22 NOTE — PLAN OF CARE
Problem: Pain:  Goal: Pain level will decrease  Description  Pain level will decrease  Outcome: Ongoing  Note:   Educated patient on pain management. Will assess patients pain level per unit protocol, and provide pain management measures as needed. Electronically signed by Power Cobian RN on 4/22/2019 at 5:54 PM      Problem: Discharge Planning:  Goal: Discharged to appropriate level of care  Description  Discharged to appropriate level of care  Outcome: Ongoing  Note:   Assessed patients knowledge of discharge. Will continue to work with patient on discharge planning and answer patient questions. Will consult case management and  as necessary. Electronically signed by Power Cobian RN on 4/22/2019 at 5:55 PM      Problem: Fluid Volume - Deficit:  Goal: Absence of fluid volume deficit signs and symptoms  Description  Absence of fluid volume deficit signs and symptoms  Outcome: Ongoing     Problem: Nutrition Deficit:  Goal: Ability to achieve adequate nutritional intake will improve  Description  Ability to achieve adequate nutritional intake will improve  Outcome: Ongoing  Note:   PAtient encouraged to eat each meal. Oral supplements given to patient. Patient tolerating PO fluids. WIll continue to monitor. Electronically signed by Power Cobian RN on 4/22/2019 at 5:55 PM      Problem: Sleep Pattern Disturbance:  Goal: Appears well-rested  Description  Appears well-rested  Outcome: Ongoing     Problem: Violence - Risk of, Self/Other-Directed:  Goal: Knowledge of developmental care interventions  Description  Absence of violence  Outcome: Ongoing     Problem: Safety:  Goal: Free from accidental physical injury  Description  Free from accidental physical injury  Outcome: Ongoing  Note:   Fall risk assessment completed. Fall precautions in place. Call light within reach. Pt educated on calling for assistance before getting up. Walkway free of clutter. Will continue to monitor.   Electronically signed by Hao Rodrigues RN on 4/22/2019 at 5:55 PM      Problem: Daily Care:  Goal: Daily care needs are met  Description  Daily care needs are met  Outcome: Ongoing  Note:   Patient assessed to determine ability to perform daily needs and ADLs. Patient assisted with any daily needs that were not able to be met individually by patient. [place] encouraged, although patient educated to ask for assistance when needed. Will continue to monitor and assist patient with meeting daily care needs as needed. Electronically signed by Hao Rodrigues RN on 4/22/2019 at 5:56 PM      Problem: Skin Integrity:  Goal: Skin integrity will stabilize  Description  Skin integrity will stabilize  Outcome: Ongoing  Note:   Will monitor skin and mucous membranes. Will turn patient every 2 hours, monitor for friction and sheering, and change dressings as needed. Will preform skin assessment every shift. Electronically signed by Hao Rodrigues RN on 4/22/2019 at 5:56 PM      Problem: Discharge Planning:  Goal: Patients continuum of care needs are met  Description  Patients continuum of care needs are met  Outcome: Ongoing     Problem: Risk for Impaired Skin Integrity  Goal: Tissue integrity - skin and mucous membranes  Description  Structural intactness and normal physiological function of skin and  mucous membranes.   Outcome: Ongoing     Problem: Falls - Risk of:  Goal: Will remain free from falls  Description  Will remain free from falls  Outcome: Ongoing     Problem: Nutrition  Goal: Optimal nutrition therapy  Outcome: Ongoing

## 2019-04-22 NOTE — PROGRESS NOTES
Progress Note  Admit Date: 2019      PCP: Giselle Bangura MD     CC: F/U for fall    SUBJECTIVE / Interval History:  Better today,still confused off and on     Allergies  Patient has no known allergies. Medications    Scheduled Meds:   chlordiazePOXIDE  5 mg Oral 4x Daily    senna  1 tablet Oral Nightly    thiamine  100 mg Oral Daily    folic acid  1 mg Oral Daily    sodium chloride flush  10 mL Intravenous 2 times per day    docusate sodium  100 mg Oral BID    enoxaparin  40 mg Subcutaneous Nightly    ipratropium-albuterol  1 ampule Inhalation Q4H WA    azithromycin  250 mg Oral Daily    famotidine  20 mg Oral BID    phenytoin  200 mg Oral BID    tamsulosin  0.4 mg Oral Daily    gabapentin  300 mg Oral TID     Continuous Infusions:      PRN Meds:  sodium chloride flush, ondansetron, LORazepam **OR** LORazepam **OR** LORazepam **OR** LORazepam **OR** LORazepam **OR** LORazepam **OR** LORazepam **OR** LORazepam, magnesium hydroxide, acetaminophen, cyclobenzaprine, oxyCODONE-acetaminophen **OR** oxyCODONE-acetaminophen, morphine **OR** morphine    Vitals    TEMPERATURE:  Current - Temp: 98.9 °F (37.2 °C); Max - Temp  Av °F (37.2 °C)  Min: 98.5 °F (36.9 °C)  Max: 100.3 °F (37.9 °C)  RESPIRATIONS RANGE: Resp  Av.4  Min: 15  Max: 18  PULSE RANGE: Pulse  Av  Min: 96  Max: 111  BLOOD PRESSURE RANGE:  Systolic (08TVU), MUV:249 , Min:104 , QP   ; Diastolic (72VEN), JSZ:30, Min:59, Max:78    PULSE OXIMETRY RANGE: SpO2  Av.3 %  Min: 92 %  Max: 97 %  24HR INTAKE/OUTPUT:      Intake/Output Summary (Last 24 hours) at 2019 0721  Last data filed at 2019 0600  Gross per 24 hour   Intake 420 ml   Output --   Net 420 ml       Exam:    Gen: No distress. Eyes: PERRL. No sclera icterus. No conjunctival injection. ENT: No discharge. Pharynx clear. External appearance of ears and nose normal.  Neck: Trachea midline. No obvious mass. Resp: No accessory muscle use.  No crackles. No wheezes. Few rhonchi. No dullness on percussion. CV: Regular rate. Regular rhythm. No murmur or rub. No edema. GI: Non-tender. Non-distended. No hernia. Skin: Warm, dry, normal texture and turgor. Rash + L arm. Dressing hip +   Lymph: No cervical LAD. No supraclavicular LAD. M/S: No cyanosis. No clubbing. R arm in sling, leg movement limited    Neuro: Moves all four extremities. CN 2-12 tested, no defect noted. Psych: Oriented x 1. Lethargic +    Data    LABS  CBC:   No results for input(s): WBC, HGB, HCT, MCV, PLT in the last 72 hours. BMP:   No results for input(s): NA, K, CL, CO2, PHOS, BUN, CREATININE in the last 72 hours. Invalid input(s): CA  POC GLUCOSE:  No results for input(s): POCGLU in the last 72 hours. LIVER PROFILE:   No results for input(s): AST, ALT, LIPASE, AMYLASE, LABALBU, BILIDIR, BILITOT, ALKPHOS in the last 72 hours. PT/INR:   No results for input(s): PROTIME, INR in the last 72 hours. APTT:   No results for input(s): APTT in the last 72 hours. UA:No results for input(s): NITRITE, COLORU, PHUR, LABCAST, WBCUA, RBCUA, MUCUS, TRICHOMONAS, YEAST, BACTERIA, CLARITYU, SPECGRAV, LEUKOCYTESUR, UROBILINOGEN, BILIRUBINUR, BLOODU, GLUCOSEU, KETUA, AMORPHOUS in the last 72 hours. Microbiology:  Wound Culture: No results for input(s): WNDABS, ORG in the last 72 hours. Invalid input(s):  LABGRAM  Nasal Culture: No results for input(s): ORG, MRSAPCR in the last 72 hours. Blood Culture: No results for input(s): BC, BLOODCULT2 in the last 72 hours. Fungal Culture:   No results for input(s): FUNGSM in the last 72 hours. No results for input(s): FUNCXBLD in the last 72 hours. CSF Culture:  No results for input(s): COLORCSF, APPEARCSF, CFTUBE, CLOTCSF, WBCCSF, RBCCSF, NEUTCSF, NUMCELLSCSF, LYMPHSCSF, MONOCSF, GLUCCSF, VOLCSF in the last 72 hours. Respiratory Culture:  No results for input(s): Thelaurie Mariana in the last 72 hours.   AFB:No results for input(s): AFBSMEAR in the last 72 hours. Urine Culture  No results for input(s): LABURIN in the last 72 hours. RADIOLOGY:    FL MODIFIED BARIUM SWALLOW W VIDEO   Final Result   No bert aspiration. Please see separate speech pathology report for full discussion of findings   and recommendations. XR HIP RIGHT (2-3 VIEWS)   Final Result   Status post ORIF proximal right femur fracture. Correlate with procedural   report. FLUORO FOR SURGICAL PROCEDURES   Final Result      XR CHEST PORTABLE   Final Result   Unchanged left-sided rib fractures. No underlying pneumothorax or pleural   effusion. Suspect lower right-sided rib fractures, age indeterminate. XR HIP RIGHT (2-3 VIEWS)   Final Result   Proximal right femoral shaft fracture with suspected involvement of the   lesser trochanter. XR FEMUR RIGHT (MIN 2 VIEWS)   Final Result   Proximal right femur fracture with involvement of the lesser trochanter. XR SHOULDER RIGHT (MIN 2 VIEWS)   Final Result   Nondisplaced proximal right humeral fracture. CT HEAD WO CONTRAST   Final Result   No acute intracranial abnormality. Areas of encephalomalacia in the inferior right frontal lobe and anterior   right temporal lobe are compatible with sequela of prior trauma. Moderate microvascular ischemic disease. XR CHEST 1 VW   Final Result   Minimally displaced left 7th and 8th rib fractures. No underlying   pneumothorax or pleural effusion. Diffuse small airway inflammation may be seen with asthma, bronchitis or   smoking. No consolidative airspace disease.              CONSULTS:    IP CONSULT TO ORTHOPEDIC SURGERY  IP CONSULT TO HOSPITALIST  IP CONSULT TO SOCIAL WORK  IP CONSULT TO CARDIOLOGY  IP CONSULT TO PULMONOLOGY    ASSESSMENT AND PLAN:      Principal Problem:    Right femoral fracture ([address]  Active Problems:    Fall    Right humeral fracture    Alcohol intoxication ([address]    Hypomagnesemia    Epilepsia ([address] Closed fracture of neck of right femur (HCC)    Alcohol dependence (HCC)    Multiple rib fractures    Syncope    Shortness of breath    Bronchitis    Tobacco abuse    ETOH abuse  Resolved Problems:    * No resolved hospital problems. *    70 y.o. male with a past medical history of alcohol dependence and withdrawal seizures  who presented to [facility] after he had a mechanical fall. Patient was intoxicated on admission. He was admitted with a right femoral shaft fracture along with humerus fracture and rib fracture. Patient underwent ORIF on 4/16/2019. Hospital course was complicated by delirium tremens and acute blood loss anemia along with bronchitis and possible aspiration pneumonia. Confused, delirious- treat as DT- improved, dc with prn librium   -  sec to alcohol withdrawal  - start CIWA      Right femoral fracture shaft and subtrochanteric  - ortho consulted  - s/p ORIF on 4/16/19     Acute blood loss anemia- hb stable  - monitor   - sec to surgery     Leucocytosis- resolved   - ? Reactive   - monitor     Humeral fracture non displaced    pain management, per orthopedic. R rib fracture  - pain control  -incentive spirometry     Alcohol dependence with abuse  - CIWA   - banana bag      History of epilepsy.  -ct home meds     Hypomagnesemia, replaced. Cough with yellow sputum- treat as bronchitis/ aspiration pneumonia    - duo neb  -ssi  -add unasyn to cover aspiration . Stop tomorrow  -procal elevated         DVT Prophylaxis: lovenox  Diet: Dietary Nutrition Supplements: Standard High Calorie Oral Supplement  DIET GENERAL; Dysphagia III Advanced; No Drinking Straw  Code Status: Full Code    PT/OT Eval Status:post surgery    Discharge plan - today     The patient and / or the family were informed of the results of any tests, a time was given to answer questions, a plan was proposed and they agreed with plan.     Discussed with consulting physicians, nursing and social work     The note was completed

## 2019-04-23 PROBLEM — R65.20 SEVERE SEPSIS (HCC): Status: ACTIVE | Noted: 2019-04-23

## 2019-04-23 PROBLEM — A41.9 SEVERE SEPSIS (HCC): Status: ACTIVE | Noted: 2019-04-23

## 2019-04-23 LAB
ALBUMIN SERPL-MCNC: 2.2 G/DL (ref 3.4–5)
ANION GAP SERPL CALCULATED.3IONS-SCNC: 8 MMOL/L (ref 3–16)
BASOPHILS ABSOLUTE: 0 K/UL (ref 0–0.2)
BASOPHILS RELATIVE PERCENT: 0.3 %
BUN BLDV-MCNC: 4 MG/DL (ref 7–20)
CALCIUM SERPL-MCNC: 7.6 MG/DL (ref 8.3–10.6)
CHLORIDE BLD-SCNC: 106 MMOL/L (ref 99–110)
CO2: 30 MMOL/L (ref 21–32)
CREAT SERPL-MCNC: <0.5 MG/DL (ref 0.8–1.3)
EKG ATRIAL RATE: 93 BPM
EKG DIAGNOSIS: NORMAL
EKG P AXIS: 80 DEGREES
EKG P-R INTERVAL: 138 MS
EKG Q-T INTERVAL: 368 MS
EKG QRS DURATION: 68 MS
EKG QTC CALCULATION (BAZETT): 457 MS
EKG R AXIS: 78 DEGREES
EKG T AXIS: 57 DEGREES
EKG VENTRICULAR RATE: 93 BPM
EOSINOPHILS ABSOLUTE: 0.8 K/UL (ref 0–0.6)
EOSINOPHILS RELATIVE PERCENT: 5.9 %
GFR AFRICAN AMERICAN: >60
GFR NON-AFRICAN AMERICAN: >60
GLUCOSE BLD-MCNC: 88 MG/DL (ref 70–99)
HCT VFR BLD CALC: 27 % (ref 40.5–52.5)
HEMOGLOBIN: 9.2 G/DL (ref 13.5–17.5)
LACTIC ACID: 0.9 MMOL/L (ref 0.4–2)
LYMPHOCYTES ABSOLUTE: 1.1 K/UL (ref 1–5.1)
LYMPHOCYTES RELATIVE PERCENT: 8.7 %
MAGNESIUM: 1.7 MG/DL (ref 1.8–2.4)
MCH RBC QN AUTO: 34.8 PG (ref 26–34)
MCHC RBC AUTO-ENTMCNC: 34 G/DL (ref 31–36)
MCV RBC AUTO: 102.3 FL (ref 80–100)
MONOCYTES ABSOLUTE: 1.5 K/UL (ref 0–1.3)
MONOCYTES RELATIVE PERCENT: 11.5 %
NEUTROPHILS ABSOLUTE: 9.4 K/UL (ref 1.7–7.7)
NEUTROPHILS RELATIVE PERCENT: 73.6 %
PDW BLD-RTO: 14.1 % (ref 12.4–15.4)
PHENYTOIN DOSE AMOUNT: ABNORMAL
PHENYTOIN LEVEL: 3.4 UG/ML (ref 10–20)
PHOSPHORUS: 2.8 MG/DL (ref 2.5–4.9)
PLATELET # BLD: 285 K/UL (ref 135–450)
PMV BLD AUTO: 10 FL (ref 5–10.5)
POTASSIUM SERPL-SCNC: 3.2 MMOL/L (ref 3.5–5.1)
PROCALCITONIN: 0.22 NG/ML (ref 0–0.15)
PROCALCITONIN: 0.26 NG/ML (ref 0–0.15)
RBC # BLD: 2.63 M/UL (ref 4.2–5.9)
SODIUM BLD-SCNC: 144 MMOL/L (ref 136–145)
WBC # BLD: 12.8 K/UL (ref 4–11)

## 2019-04-23 PROCEDURE — 6360000002 HC RX W HCPCS: Performed by: INTERNAL MEDICINE

## 2019-04-23 PROCEDURE — 80069 RENAL FUNCTION PANEL: CPT

## 2019-04-23 PROCEDURE — 2580000003 HC RX 258: Performed by: EMERGENCY MEDICINE

## 2019-04-23 PROCEDURE — 6360000002 HC RX W HCPCS: Performed by: EMERGENCY MEDICINE

## 2019-04-23 PROCEDURE — 80185 ASSAY OF PHENYTOIN TOTAL: CPT

## 2019-04-23 PROCEDURE — 2700000000 HC OXYGEN THERAPY PER DAY

## 2019-04-23 PROCEDURE — 6370000000 HC RX 637 (ALT 250 FOR IP): Performed by: HOSPITALIST

## 2019-04-23 PROCEDURE — 6360000002 HC RX W HCPCS: Performed by: HOSPITALIST

## 2019-04-23 PROCEDURE — 99223 1ST HOSP IP/OBS HIGH 75: CPT | Performed by: INTERNAL MEDICINE

## 2019-04-23 PROCEDURE — 96367 TX/PROPH/DG ADDL SEQ IV INF: CPT

## 2019-04-23 PROCEDURE — 1200000000 HC SEMI PRIVATE

## 2019-04-23 PROCEDURE — 94760 N-INVAS EAR/PLS OXIMETRY 1: CPT

## 2019-04-23 PROCEDURE — 36415 COLL VENOUS BLD VENIPUNCTURE: CPT

## 2019-04-23 PROCEDURE — APPNB15 APP NON BILLABLE TIME 0-15 MINS: Performed by: NURSE PRACTITIONER

## 2019-04-23 PROCEDURE — 2580000003 HC RX 258: Performed by: INTERNAL MEDICINE

## 2019-04-23 PROCEDURE — 83605 ASSAY OF LACTIC ACID: CPT

## 2019-04-23 PROCEDURE — 83735 ASSAY OF MAGNESIUM: CPT

## 2019-04-23 PROCEDURE — 93010 ELECTROCARDIOGRAM REPORT: CPT | Performed by: INTERNAL MEDICINE

## 2019-04-23 PROCEDURE — 6370000000 HC RX 637 (ALT 250 FOR IP): Performed by: INTERNAL MEDICINE

## 2019-04-23 PROCEDURE — 2580000003 HC RX 258: Performed by: NURSE PRACTITIONER

## 2019-04-23 PROCEDURE — 96368 THER/DIAG CONCURRENT INF: CPT

## 2019-04-23 PROCEDURE — 85025 COMPLETE CBC W/AUTO DIFF WBC: CPT

## 2019-04-23 PROCEDURE — 6370000000 HC RX 637 (ALT 250 FOR IP): Performed by: NURSE PRACTITIONER

## 2019-04-23 PROCEDURE — 6360000002 HC RX W HCPCS: Performed by: NURSE PRACTITIONER

## 2019-04-23 PROCEDURE — 84145 PROCALCITONIN (PCT): CPT

## 2019-04-23 RX ORDER — POTASSIUM CHLORIDE 20 MEQ/1
40 TABLET, EXTENDED RELEASE ORAL PRN
Status: DISCONTINUED | OUTPATIENT
Start: 2019-04-23 | End: 2019-04-24 | Stop reason: ALTCHOICE

## 2019-04-23 RX ORDER — PHENYTOIN SODIUM 100 MG/1
200 CAPSULE, EXTENDED RELEASE ORAL 2 TIMES DAILY
Status: DISCONTINUED | OUTPATIENT
Start: 2019-04-23 | End: 2019-04-24

## 2019-04-23 RX ORDER — MAGNESIUM SULFATE 1 G/100ML
1 INJECTION INTRAVENOUS PRN
Status: DISCONTINUED | OUTPATIENT
Start: 2019-04-23 | End: 2019-05-07 | Stop reason: HOSPADM

## 2019-04-23 RX ORDER — FOLIC ACID 1 MG/1
1 TABLET ORAL DAILY
Status: DISCONTINUED | OUTPATIENT
Start: 2019-04-23 | End: 2019-05-07 | Stop reason: HOSPADM

## 2019-04-23 RX ORDER — SODIUM CHLORIDE, SODIUM LACTATE, POTASSIUM CHLORIDE, CALCIUM CHLORIDE 600; 310; 30; 20 MG/100ML; MG/100ML; MG/100ML; MG/100ML
1000 INJECTION, SOLUTION INTRAVENOUS ONCE
Status: DISCONTINUED | OUTPATIENT
Start: 2019-04-23 | End: 2019-04-23

## 2019-04-23 RX ORDER — POTASSIUM CHLORIDE 7.45 MG/ML
10 INJECTION INTRAVENOUS PRN
Status: DISCONTINUED | OUTPATIENT
Start: 2019-04-23 | End: 2019-04-24 | Stop reason: ALTCHOICE

## 2019-04-23 RX ORDER — SODIUM CHLORIDE, SODIUM LACTATE, POTASSIUM CHLORIDE, CALCIUM CHLORIDE 600; 310; 30; 20 MG/100ML; MG/100ML; MG/100ML; MG/100ML
INJECTION, SOLUTION INTRAVENOUS CONTINUOUS
Status: DISCONTINUED | OUTPATIENT
Start: 2019-04-23 | End: 2019-04-23

## 2019-04-23 RX ORDER — MULTIVITAMIN WITH FOLIC ACID 400 MCG
1 TABLET ORAL DAILY
Status: DISCONTINUED | OUTPATIENT
Start: 2019-04-23 | End: 2019-05-07 | Stop reason: HOSPADM

## 2019-04-23 RX ORDER — THIAMINE MONONITRATE (VIT B1) 100 MG
100 TABLET ORAL DAILY
Status: DISCONTINUED | OUTPATIENT
Start: 2019-04-23 | End: 2019-04-24

## 2019-04-23 RX ORDER — MAGNESIUM SULFATE IN WATER 40 MG/ML
2 INJECTION, SOLUTION INTRAVENOUS ONCE
Status: COMPLETED | OUTPATIENT
Start: 2019-04-23 | End: 2019-04-23

## 2019-04-23 RX ORDER — QUETIAPINE FUMARATE 25 MG/1
25 TABLET, FILM COATED ORAL 2 TIMES DAILY
Status: DISCONTINUED | OUTPATIENT
Start: 2019-04-23 | End: 2019-04-28

## 2019-04-23 RX ORDER — POTASSIUM CHLORIDE 7.45 MG/ML
10 INJECTION INTRAVENOUS ONCE
Status: COMPLETED | OUTPATIENT
Start: 2019-04-23 | End: 2019-04-23

## 2019-04-23 RX ORDER — SODIUM CHLORIDE 0.9 % (FLUSH) 0.9 %
10 SYRINGE (ML) INJECTION PRN
Status: DISCONTINUED | OUTPATIENT
Start: 2019-04-23 | End: 2019-05-04

## 2019-04-23 RX ORDER — HALOPERIDOL 5 MG/ML
2 INJECTION INTRAMUSCULAR EVERY 6 HOURS PRN
Status: DISCONTINUED | OUTPATIENT
Start: 2019-04-23 | End: 2019-05-07 | Stop reason: HOSPADM

## 2019-04-23 RX ORDER — SODIUM CHLORIDE 0.9 % (FLUSH) 0.9 %
10 SYRINGE (ML) INJECTION EVERY 12 HOURS SCHEDULED
Status: DISCONTINUED | OUTPATIENT
Start: 2019-04-23 | End: 2019-05-04

## 2019-04-23 RX ORDER — LACTOBACILLUS RHAMNOSUS GG 10B CELL
2 CAPSULE ORAL 2 TIMES DAILY
Status: DISCONTINUED | OUTPATIENT
Start: 2019-04-23 | End: 2019-04-25

## 2019-04-23 RX ADMIN — PIPERACILLIN SODIUM,TAZOBACTAM SODIUM 3.38 G: 3; .375 INJECTION, POWDER, FOR SOLUTION INTRAVENOUS at 01:04

## 2019-04-23 RX ADMIN — CEFEPIME 2 G: 2 INJECTION, POWDER, FOR SOLUTION INTRAVENOUS at 10:18

## 2019-04-23 RX ADMIN — SODIUM CHLORIDE 1000 ML: 9 INJECTION, SOLUTION INTRAVENOUS at 01:08

## 2019-04-23 RX ADMIN — POTASSIUM CHLORIDE 10 MEQ: 7.46 INJECTION, SOLUTION INTRAVENOUS at 04:34

## 2019-04-23 RX ADMIN — POTASSIUM CHLORIDE 10 MEQ: 7.46 INJECTION, SOLUTION INTRAVENOUS at 05:36

## 2019-04-23 RX ADMIN — PHENYTOIN SODIUM 200 MG: 100 CAPSULE ORAL at 21:27

## 2019-04-23 RX ADMIN — FOLIC ACID 1 MG: 1 TABLET ORAL at 10:19

## 2019-04-23 RX ADMIN — POTASSIUM CHLORIDE 10 MEQ: 7.46 INJECTION, SOLUTION INTRAVENOUS at 09:17

## 2019-04-23 RX ADMIN — Medication 2 CAPSULE: at 21:27

## 2019-04-23 RX ADMIN — POTASSIUM CHLORIDE 10 MEQ: 7.46 INJECTION, SOLUTION INTRAVENOUS at 03:18

## 2019-04-23 RX ADMIN — Medication 10 ML: at 10:20

## 2019-04-23 RX ADMIN — VANCOMYCIN HYDROCHLORIDE 1000 MG: 1 INJECTION, POWDER, LYOPHILIZED, FOR SOLUTION INTRAVENOUS at 10:18

## 2019-04-23 RX ADMIN — Medication 10 ML: at 21:32

## 2019-04-23 RX ADMIN — Medication 100 MG: at 10:19

## 2019-04-23 RX ADMIN — MAGNESIUM SULFATE HEPTAHYDRATE 2 G: 40 INJECTION, SOLUTION INTRAVENOUS at 10:18

## 2019-04-23 RX ADMIN — SODIUM CHLORIDE, SODIUM LACTATE, POTASSIUM CHLORIDE, AND CALCIUM CHLORIDE 1000 ML: .6; .31; .03; .02 INJECTION, SOLUTION INTRAVENOUS at 17:27

## 2019-04-23 RX ADMIN — Medication 10 ML: at 01:08

## 2019-04-23 RX ADMIN — CEFEPIME 2 G: 2 INJECTION, POWDER, FOR SOLUTION INTRAVENOUS at 23:48

## 2019-04-23 RX ADMIN — Medication 2 CAPSULE: at 10:19

## 2019-04-23 RX ADMIN — PHENYTOIN SODIUM 200 MG: 100 CAPSULE ORAL at 10:19

## 2019-04-23 RX ADMIN — MAGNESIUM SULFATE HEPTAHYDRATE 2 G: 40 INJECTION, SOLUTION INTRAVENOUS at 00:56

## 2019-04-23 RX ADMIN — POTASSIUM CHLORIDE 10 MEQ: 7.46 INJECTION, SOLUTION INTRAVENOUS at 06:39

## 2019-04-23 RX ADMIN — ENOXAPARIN SODIUM 40 MG: 40 INJECTION SUBCUTANEOUS at 10:20

## 2019-04-23 RX ADMIN — Medication 10 MEQ: at 01:40

## 2019-04-23 RX ADMIN — HALOPERIDOL LACTATE 2 MG: 5 INJECTION, SOLUTION INTRAMUSCULAR at 19:04

## 2019-04-23 RX ADMIN — THERA TABS 1 TABLET: TAB at 10:19

## 2019-04-23 RX ADMIN — VANCOMYCIN HYDROCHLORIDE 1000 MG: 1 INJECTION, POWDER, LYOPHILIZED, FOR SOLUTION INTRAVENOUS at 23:48

## 2019-04-23 RX ADMIN — SODIUM CHLORIDE, POTASSIUM CHLORIDE, SODIUM LACTATE AND CALCIUM CHLORIDE: 600; 310; 30; 20 INJECTION, SOLUTION INTRAVENOUS at 02:56

## 2019-04-23 RX ADMIN — QUETIAPINE FUMARATE 25 MG: 25 TABLET ORAL at 21:27

## 2019-04-23 ASSESSMENT — PAIN SCALES - GENERAL
PAINLEVEL_OUTOF10: 2
PAINLEVEL_OUTOF10: 0
PAINLEVEL_OUTOF10: 0
PAINLEVEL_OUTOF10: 2

## 2019-04-23 ASSESSMENT — PAIN DESCRIPTION - ORIENTATION: ORIENTATION: RIGHT

## 2019-04-23 ASSESSMENT — PAIN DESCRIPTION - PAIN TYPE: TYPE: SURGICAL PAIN

## 2019-04-23 ASSESSMENT — PAIN DESCRIPTION - PROGRESSION: CLINICAL_PROGRESSION: NOT CHANGED

## 2019-04-23 ASSESSMENT — PAIN DESCRIPTION - LOCATION: LOCATION: ARM;HIP

## 2019-04-23 ASSESSMENT — PAIN DESCRIPTION - FREQUENCY: FREQUENCY: INTERMITTENT

## 2019-04-23 NOTE — PROGRESS NOTES
Hospitalist Progress Note    CC:     Altered Mental Status      Admit date: 4/22/2019  Days in hospital:  0    Subjective:     Patient was confused this morning. Denies any complaints. No acute events overnight    ROS:   Unable to obtain due to his clinical picture      Objective:    BP (!) 141/62   Pulse 114   Temp 97.6 °F (36.4 °C) (Axillary)   Resp 20   Ht 5' 9\" (1.753 m)   Wt 161 lb 9.6 oz (73.3 kg)   SpO2 100%   BMI 23.86 kg/m²     Gen: alert, NAD  HEENT: NC/AT, moist mucous membranes, no oropharyngeal erythema or exudate  Neck: supple, trachea midline, no anterior cervical or SC LAD  Heart: Normal s1/s2, RRR, no murmurs, gallops, or rubs. Lungs: Bilateral crackles, no wheezing, no use of accessory muscles  Abd: bowel sounds present, soft, nondistended, nontender  Extrem: No clubbing, cyanosis, no  edema  Psych: Alert, oriented to place. Disoriented to time, and situation  Neuro: grossly intact, moves all four extremities spontaneously.       Medications:  Scheduled Meds:   phenytoin  200 mg Oral BID    sodium chloride flush  10 mL Intravenous 2 times per day    enoxaparin  40 mg Subcutaneous Daily    vancomycin (VANCOCIN) intermittent dosing (placeholder)   Other RX Placeholder    vancomycin  1,000 mg Intravenous Q12H    lactobacillus  2 capsule Oral BID    multivitamin  1 tablet Oral Daily    thiamine  100 mg Oral Daily    folic acid  1 mg Oral Daily    cefepime  2 g Intravenous Q12H    QUEtiapine  25 mg Oral BID       PRN Meds:  sodium chloride flush, potassium chloride **OR** potassium alternative oral replacement **OR** potassium chloride, magnesium sulfate, haloperidol lactate    IV:        Intake/Output Summary (Last 24 hours) at 4/23/2019 1518  Last data filed at 4/23/2019 0536  Gross per 24 hour   Intake 266.67 ml   Output 265 ml   Net 1.67 ml       Results:  CBC:   Recent Labs     04/22/19  2110 04/23/19  0845   WBC 14.1* 12.8*   HGB 8.4* 9.2*   HCT 25.2* 27.0*   MCV 103.3* 102.3*    285     BMP:   Recent Labs     04/22/19 2110 04/23/19  0845    144   K 2.6* 3.2*    106   CO2 30 30   PHOS  --  2.8   BUN 5* 4*   CREATININE 0.5* <0.5*     Mag: No results for input(s): MAG in the last 72 hours. Phos:   Lab Results   Component Value Date    PHOS 2.8 04/23/2019     No components found for: GLU    LIVER PROFILE:   Recent Labs     04/22/19 2110   AST 20   ALT 17   BILIDIR <0.2   BILITOT 0.5   ALKPHOS 83     PT/INR: No results for input(s): PROTIME, INR in the last 72 hours. APTT: No results for input(s): APTT in the last 72 hours. UA:  Recent Labs     04/22/19 2214   COLORU DK YELLOW   PHUR 7.0  7.0   WBCUA 3-5   RBCUA 0-2   BACTERIA Rare*   CLARITYU Clear   SPECGRAV 1.018   LEUKOCYTESUR Negative   UROBILINOGEN 1.0   BILIRUBINUR SMALL*   BLOODU Negative   GLUCOSEU Negative       Invalid input(s): ABG  Lab Results   Component Value Date    CALCIUM 7.6 (L) 04/23/2019    PHOS 2.8 04/23/2019       Assessment and Plan:    Pneumonia  Continue to treat gram-negative bacteria. Continue cefepime  Continue with vancomycin, check nasal MRSA and DC vancomycin if its negative. Follow blood and sputum cultures      Acute metabolic encephalopathy  Most likely secondary to infection  We'll continue to monitor    Sepsis:   with leukocytosis and tachycardia on admission.   Secondary to pneumonia  Continue to monitor    Acute hypoxic respiratory failure  With tachypnea and satting 89 on room air and admission  Secondary to pneumonia  Titrate oxygen down to keep saturation above 90%    Hypokalemia  Replete and recheck    Right femoral fracture shaft and subtrochanteric  s/p ORIF on 4/16/19   PTOT      Humeral fracture non displaced    pain management    Alcohol dependence with abuse  Not withdrawing  Continue to monitor    History of seizure  Continue with phenytoin    Code status:  Full   DVT prophylaxis: Lovenox   Disposition: Pending clinical improvement   Electronically signed by Cheng Miranda MD on 4/23/2019 at 3:18 PM

## 2019-04-23 NOTE — ED NOTES
This nurse attempted to use yanker to suction secretions from pt's mouth/throat. This nurse was able to get yanker into mouth but pt then bit down onto it, not allowing for me to remove it.       Adam Zimmerman RN  04/23/19 2455

## 2019-04-23 NOTE — ED PROVIDER NOTES
[facility]  eMERGENCY dEPARTMENT eNCOUnter      Pt Name: Norman Gill  MRN: [id number]  [place] 1955  Date of evaluation: 4/22/2019  Provider: Rocio Perea MD    CHIEF COMPLAINT       Chief Complaint   Patient presents with    Altered Mental Status       HISTORY OF PRESENT ILLNESS    Norman Gill is a 70 y.o. male who presents to the emergency department with AMS. Patient was just discharged today. Per SNF patient has been confused and found to have redness to his left arm. Significant cough productive of sputum as well. Patient recently discharged for ORIF after femoral fracture. Started on abx for bronchitis. Hx ETOH abuse with confusion. Currently confused. No other associated symptoms. Nursing Notes were reviewed. REVIEW OF SYSTEMS       Review of Systems   Constitutional: Negative for activity change, appetite change, chills, diaphoresis, fatigue, fever and unexpected weight change. HENT: Negative for congestion, dental problem, drooling, ear discharge and ear pain. Eyes: Negative for photophobia, pain, discharge, redness, itching and visual disturbance. Respiratory: Positive for cough and shortness of breath. Negative for apnea, choking, chest tightness, wheezing and stridor. Cardiovascular: Negative for chest pain, palpitations and leg swelling. Gastrointestinal: Negative for abdominal distention, abdominal pain, anal bleeding, blood in stool, constipation, diarrhea, nausea, rectal pain and vomiting. Endocrine: Negative for cold intolerance and heat intolerance. Genitourinary: Negative for decreased urine volume and urgency. Musculoskeletal: Negative for arthralgias and back pain. Skin: Positive for color change and rash. Negative for pallor. Neurological: Negative for dizziness and facial asymmetry. Hematological: Negative for adenopathy. Does not bruise/bleed easily.    Psychiatric/Behavioral: Negative for agitation, behavioral problems, confusion and decreased concentration. Except as noted above the remainder of the review of systems was reviewed and negative. PAST MEDICAL HISTORY     Past Medical History:   Diagnosis Date    BPH (benign prostatic hyperplasia)     Seizures ([address]        SURGICAL HISTORY       Past Surgical History:   Procedure Laterality Date    FEMUR FRACTURE SURGERY Right 4/16/2019    INTRAMEDULLARY NAILING RIGHT FEMUR performed by Jak Smith MD at [facility]       Previous Medications    ASPIRIN 325 MG EC TABLET    Take 1 tablet by mouth daily for 14 days Begin after Lovenox dosing completed for DVT prophylaxis    AZITHROMYCIN (ZITHROMAX) 250 MG TABLET    Take 1 tablet by mouth daily for 3 days    CHLORDIAZEPOXIDE (LIBRIUM) 5 MG CAPSULE    Take 1 capsule by mouth 3 times daily as needed for Anxiety for up to 3 days. CYCLOBENZAPRINE (FLEXERIL) 10 MG TABLET    Take 1 tablet by mouth 3 times daily as needed for Muscle spasms    DOCUSATE SODIUM (COLACE, DULCOLAX) 100 MG CAPS    Take 100 mg by mouth 2 times daily    DOXAZOSIN (CARDURA) 4 MG TABLET    Take 4 mg by mouth nightly    ENOXAPARIN (LOVENOX) 40 MG/0.4ML INJECTION    Inject 0.4 mLs into the skin nightly for 14 days    FAMOTIDINE (PEPCID) 20 MG TABLET    Take 1 tablet by mouth 2 times daily    FOLIC ACID (FOLVITE) 1 MG TABLET    Take 1 tablet by mouth daily    IPRATROPIUM-ALBUTEROL (DUONEB) 0.5-2.5 (3) MG/3ML SOLN NEBULIZER SOLUTION    Inhale 3 mLs into the lungs every 4 hours (while awake)    OXYCODONE-ACETAMINOPHEN (PERCOCET) 5-325 MG PER TABLET    Take 1-2 tablets by mouth every 4 hours as needed for Pain for up to 7 days. PHENYTOIN (DILANTIN) 100 MG ER CAPSULE    Take 200 mg by mouth 2 times daily     SENNA (SENOKOT) 8.6 MG TABLET    Take 1 tablet by mouth nightly    VITAMIN B-1 100 MG TABLET    Take 1 tablet by mouth daily       ALLERGIES     Patient has no known allergies.     FAMILY HISTORY      No family history on file. SOCIAL HISTORY       Social History     Socioeconomic History    Marital status:      Spouse name: Not on file    Number of children: Not on file    Years of education: Not on file    Highest education level: Not on file   Occupational History    Not on file   Social Needs    Financial resource strain: Not on file    Food insecurity:     Worry: Not on file     Inability: Not on file    Transportation needs:     Medical: Not on file     Non-medical: Not on file   Tobacco Use    Smoking status: Current Every Day Smoker     Packs/day: 1.50     Types: Cigarettes    Smokeless tobacco: Never Used   Substance and Sexual Activity    Alcohol use: Yes     Alcohol/week: 3.6 oz     Types: 6 Cans of beer per week     Comment: normally drinks 6beers a day minimum, 18 max    Drug use: Not Currently    Sexual activity: Not on file   Lifestyle    Physical activity:     Days per week: Not on file     Minutes per session: Not on file    Stress: Not on file   Relationships    Social connections:     Talks on phone: Not on file     Gets together: Not on file     Attends Rastafarian service: Not on file     Active member of club or organization: Not on file     Attends meetings of clubs or organizations: Not on file     Relationship status: Not on file    Intimate partner violence:     Fear of current or ex partner: Not on file     Emotionally abused: Not on file     Physically abused: Not on file     Forced sexual activity: Not on file   Other Topics Concern    Not on file   Social History Narrative    Not on file       PHYSICAL EXAM       ED Triage Vitals [04/22/19 2016]   BP Temp Temp Source Pulse Resp SpO2 Height Weight   115/61 99.7 °F (37.6 °C) Oral 120 14 (!) 89 % 5' 9\" (1.753 m) 175 lb 14.8 oz (79.8 kg)       Physical Exam   Constitutional: He appears well-developed. No distress. HENT:   Head: Normocephalic and atraumatic. Eyes: Pupils are equal, round, and reactive to light.  Right eye exhibits no discharge. Left eye exhibits no discharge. Neck: Normal range of motion. No tracheal deviation present. No thyromegaly present. Cardiovascular: Normal rate and regular rhythm. No murmur heard. Pulmonary/Chest: He has no wheezes. He has no rales. He exhibits no tenderness. Abdominal: Soft. He exhibits no distension and no mass. There is no tenderness. There is no rebound and no guarding. Musculoskeletal: Normal range of motion. He exhibits no edema, tenderness or deformity. Range of motion all joints without pain, no pain with range of motion left elbow       Neurological: He is alert. No cranial nerve deficit. He exhibits normal muscle tone. Coordination normal.   Skin: Skin is warm. He is not diaphoretic. There is erythema. No pallor.    Well demarcated area of erythema on the left arm warm to touch concerning for cellulitis          DIAGNOSTIC RESULTS     EKG: All EKG's are interpreted by the Emergency Department Physician who either signs or Co-signs this chart in the absence of acardiologist.    None    RADIOLOGY:   Non-plain film images such as CT, Ultrasoundand MRI are read by the radiologist. Plain radiographic images are visualized and preliminarily interpreted by the emergency physician with the below findings:    CXR concerning for PNA  CTA pending       ED BEDSIDE ULTRASOUND:   Performed by ED Physician - none    LABS:  Labs Reviewed   CBC WITH AUTO DIFFERENTIAL - Abnormal; Notable for the following components:       Result Value    WBC 14.1 (*)     RBC 2.44 (*)     Hemoglobin 8.4 (*)     Hematocrit 25.2 (*)     .3 (*)     MCH 34.6 (*)     Neutrophils # 12.5 (*)     Lymphocytes # 0.6 (*)     Bands Relative 12 (*)     Anisocytosis Occasional (*)     Macrocytes Occasional (*)     All other components within normal limits    Narrative:     Performed at:  [place] [address]   Phone [phone]   BASIC METABOLIC PANEL W/ REFLEX TO MG FOR LOW K - Abnormal; Notable for the following components:    Potassium reflex Magnesium 2.6 (*)     Glucose 132 (*)     BUN 5 (*)     CREATININE 0.5 (*)     Calcium 7.8 (*)     All other components within normal limits    Narrative:     Yenni Moreau tel. [phone],  Chemistry results called to and read back by TERRIE Cho, 04/22/2019  22:15, by [address] [place] [address]  Performed at:  [address] [place] [address]   Phone [phone]   BRAIN NATRIURETIC PEPTIDE - Abnormal; Notable for the following components:    Pro-BNP 1,066 (*)     All other components within normal limits    Narrative:     Performed at:  [address] [facility] [address]   Phone [phone]   SALICYLATE LEVEL - Abnormal; Notable for the following components:    Salicylate, Serum <7.1 (*)     All other components within normal limits    Narrative:     Performed at:  [address] [facility] [address]   Phone [phone]   ACETAMINOPHEN LEVEL - Abnormal; Notable for the following components:    Acetaminophen Level <5 (*)     All other components within normal limits    Narrative:     Performed at:  [address] [facility] [address]   Phone [phone]   HEPATIC FUNCTION PANEL - Abnormal; Notable for the following components:     Total Protein 5.7 (*)     Alb 1.9 (*)     All other components within normal limits    Narrative:     Performed at:  [address] [facility] [address]   Phone [phone]   MAGNESIUM - Abnormal; Notable for the following components:    Magnesium 1.40 (*)     All other components within normal limits    Narrative:     Yenni Moreau tel. [phone],  Chemistry results called to and read back by TERRIE Vale Pedro, 04/22/2019  22:15, by ISSA  Performed at:  [facility]  [address] [place], [place] [address]   Phone [phone]   CULTURE BLOOD #1   CULTURE BLOOD #2   LACTIC ACID, PLASMA    Narrative:     Performed at:  [facility]  [address] [place], [place] [address]   Phone [phone]   TROPONIN    Narrative:     Performed at:  [facility]  [address] [facility] [place] [address]   Phone [phone]   URINE RT REFLEX TO CULTURE    Narrative:     Performed at:  [facility]  [address] [facility] [place] [address]   Phone [phone]   ETHANOL    Narrative:     Performed at:  [facility]  [address] [facility] [place] [address]   Phone [phone]   AMMONIA    Narrative:     Performed at:  [facility]  [address] [facility] [place] [address]   Phone [phone]   URINE DRUG SCREEN    Narrative:     Performed at:  [facility]  [address] [facility] [place] [address]   Phone [phone]       All other labs were withinnormal range or not returned as of this dictation. EMERGENCY DEPARTMENT COURSE and DIFFERENTIAL DIAGNOSIS/MDM:     This is a 70 yr old M Hx ETOH abuse, Seizures, Confusion who was discharged today after ORIF for right femoral fracture to NH found to be more confused than usual with erythema and warmth to left arm. Tachycardia and hypoxic on arrival on 3L NC now. Left arm concerning for cellulitis, patient with productive cough and CXR shows developing PNA (There was concern for aspiration during stay initially), WBC jumped up to 14, Mg and K low at 2.6 and 1.4. Fluids and abx started. Fluids and abx started. Will also get a CTA to make sure no PE as recent surgery and hypoxia initially.  Admission for sepsis, HAP, Cellulitis, hypokalemia, hypomag. CRITICAL CARE TIME   Total Critical Caretime was 39 minutes, excluding separately reportable procedures. There was a high probability of clinically significant/life threatening deterioration in the patient's condition which required my urgent intervention. PROCEDURES:  Unlessotherwise noted below, none    FINAL IMPRESSION      1. Septicemia ([address]    2. Leukemoid reaction    3. Hypokalemia    4. Hypomagnesemia    5. Cellulitis of left upper extremity    6.  HAP (hospital-acquired pneumonia)          DISPOSITION/PLAN   DISPOSITION      Admission    (Please note that portions ofthis note were completed with a voice recognition program.  Efforts were made to edit the dictations but occasionally words are mis-transcribed.)    Ester Ewing MD(electronically signed)  Attending Emergency Physician        Ester Ewing MD  04/22/19 8993

## 2019-04-23 NOTE — PROGRESS NOTES
Medication Reconciliation    List of medications patient is currently taking is complete. Source of information: 1. Discharge medication list from 04/22/19     Allergies  Patient has no known allergies.

## 2019-04-23 NOTE — PROGRESS NOTES
H/p dictation id [id number]. Date of service 04/23/19. Hcap. Cellulitis. Acute encephalopathy. Sepsis.

## 2019-04-23 NOTE — ED NOTES
Bed: B-04  Expected date:   Expected time:   Means of arrival:   Comments:  Sent from nh for arm infections     [address] Nahid Ford RN  04/22/19 2009

## 2019-04-23 NOTE — PROGRESS NOTES
0720-handoff completed, pt confused, restless, repositioned,   0920-pt wont take any pills and wont eat for RN, attempt to reposition sling on right arm, pt resists, will take sips of water, bed alrm on, call light in lap,   0950-pts brother Martha Britton and his wife called and then came to visit, very upset [facility] did not notify family when pt was sent back to hospital, IRENA keeps trying to reason with pt who is very confused, family overwhelmed, aware he cant go back home and they have their own health issues, pt does have a living mother in [place] who family reports drinks as well and is not realist to pts situation, family reachable by cell if needed, Josh Tan first [phone] Brother adenike cell [phone] however brother is having surgery obn Friday and may be unreachable. Aware will have SS contact them to discuss discharge plan again, appreciative, will follow up  1130-pt very restless attempting to crawl OOB many times, today, placed back into bed with bed alarm on, call light in reach,   1230-attempted to obtain MRSA swab, pt has firm  on nurses hand telling her to leave him alone, pt very confused, becoming more restless and combative, unable to obtain speciman at this time, pt thinks he is [facility] and we are holding him in half-way. Keeps trying to get OOB,   1500-Perfect serve message to DR Ti Alex for PRN order for something for sedation, MD placed orders, since last repostiion pt has been calm,   1820-pt has been calm without giving sedation but will not take pills for RN, or eat, dinner tray ordered for pt,   1910-pt has pulled out both IV's, new gown changed, pt repositioned to eat, will not eat,   1925-handoff completed, oncoming RN pt does not currently have IV access, im haldol given, . Electronically signed by Kwan Liu RN on 4/23/2019 at 7:40 PM

## 2019-04-23 NOTE — ED NOTES
Pt is completely confused at this time. Pt keeps asking for a beer and states he's \"ready to go home. \" Pt has also become very rude and non-compliant. Pt will not keep left arm straight in order to allow IV abx to infuse. Pt also refused CT when CT took him for his CT chest. Dr. Domo Sal notified of pt's actions.       Jose Guadalupe Parker RN  04/23/19 7745

## 2019-04-23 NOTE — CONSULTS
Clinical Pharmacy Note  Vancomycin Consult    Geneva Martin is a 70 y.o. male ordered Vancomycin for sepsis; consult received from Dr. Alma Malave to manage therapy. Also receiving Zosyn, Zithromax. Patient Active Problem List   Diagnosis    Fall    Right humeral fracture    Right femoral fracture (HCC)    Alcohol intoxication ([facility]    Hypomagnesemia    Epilepsia ([facility]    Closed fracture of neck of right femur ([facility]    Alcohol dependence ([facility]    Multiple rib fractures    Syncope    Shortness of breath    Bronchitis    Tobacco abuse    ETOH abuse    Severe sepsis (HCC)       Allergies:  Patient has no known allergies. Temp max:  Temp (24hrs), Av °F (37.2 °C), Min:98.6 °F (37 °C), Max:99.7 °F (37.6 °C)      Recent Labs     19   WBC 14.1*       Recent Labs     19   BUN 5*   CREATININE 0.5*       No intake or output data in the 24 hours ending 19 0251    Culture Results:  Pending    Ht Readings from Last 1 Encounters:   19 5' 9\" (1.753 m)        Wt Readings from Last 1 Encounters:   19 175 lb 14.8 oz (79.8 kg)         Estimated Creatinine Clearance: 147 mL/min (A) (based on SCr of 0.5 mg/dL (L)). Assessment/Plan:  Will initiate vancomycin 1000 mg IV every 12 hours. Regimen projects a trough level of 15-20 mg/L. Trough to be drawn prior to the 4th dose of vancomycin. Thank you for the consult. Will continue to follow.

## 2019-04-23 NOTE — ED NOTES
Dr. Elio Gregory notified of pt's low BP. Orders to give 1L NSS.       Cori Reardon RN  04/23/19 6201

## 2019-04-23 NOTE — PROGRESS NOTES
Hospitalist Progress Note      PCP: Serjio Rojas MD    Date of Admission: 4/22/2019    Chief Complaint: [facility], [facility] Course: ***     Subjective: ***       Medications:  Reviewed    Infusion Medications    lactated ringers      lactated ringers 100 mL/hr at 04/23/19 0256     Scheduled Medications    phenytoin  200 mg Oral BID    sodium chloride flush  10 mL Intravenous 2 times per day    enoxaparin  40 mg Subcutaneous Daily    piperacillin-tazobactam  3.375 g Intravenous Q8H    azithromycin  500 mg Intravenous Q24H    vancomycin (VANCOCIN) intermittent dosing (placeholder)   Other RX Placeholder    vancomycin  1,000 mg Intravenous Q12H    lactobacillus  2 capsule Oral BID    sodium chloride  1,000 mL Intravenous Once     PRN Meds: sodium chloride flush, potassium chloride **OR** potassium alternative oral replacement **OR** potassium chloride      Intake/Output Summary (Last 24 hours) at 4/23/2019 0756  Last data filed at 4/23/2019 0536  Gross per 24 hour   Intake 266.67 ml   Output 265 ml   Net 1.67 ml       Physical Exam Performed:    /66   Pulse 85   Temp 97.6 °F (36.4 °C) (Axillary)   Resp 18   Ht 5' 9\" (1.753 m)   Wt 161 lb 9.6 oz (73.3 kg)   SpO2 92%   BMI 23.86 kg/m²     General appearance: No apparent distress, appears stated age and cooperative. HEENT: Pupils equal, round, and reactive to light. Conjunctivae/corneas clear. Neck: Supple, with full range of motion. No jugular venous distention. Trachea midline. Respiratory:  Normal respiratory effort. Clear to auscultation, bilaterally without Rales/Wheezes/Rhonchi. Cardiovascular: Regular rate and rhythm with normal S1/S2 without murmurs, rubs or gallops. Abdomen: Soft, non-tender, non-distended with normal bowel sounds. Musculoskeletal: No clubbing, cyanosis or edema bilaterally. Full range of motion without deformity. Skin: Skin color, texture, turgor normal.  No rashes or lesions.   Neurologic:  Neurovascularly

## 2019-04-23 NOTE — H&P
[address] [facility]                              HISTORY AND PHYSICAL    PATIENT NAME: Jacob Johnson                 :        1955  MED REC NO:   [id number]                          ROOM:       2114  ACCOUNT NO:   [de-identified]                           ADMIT DATE: 2019  PROVIDER:     Fahad Mello MD    I obtained the history and performed the physical exam on the patient in  the intensive care unit on 2019. SOURCE OF HISTORY:  ER documentation. The patient is unable to provide  any history because of significant confusion. CHIEF COMPLAINT:  Altered mental status. HISTORY OF PRESENT ILLNESS:  This 69-year-old  male presenting  from nursing home with increasing confusion status post discharge today  with the patient apparently was found to be increasingly confused at the  [facility] and was found to have left arm redness along with significant  cough. The patient is significantly confused unable to provide history. PAST MEDICAL/PAST SURGICAL HISTORY:  1.  Seizures. 2.  Benign prostatic hypertrophy. 3.  Chronic alcoholism with encephalopathy. PAST SURGICAL HISTORY:  The patient had a femur fracture surgery. ALLERGIC HISTORY:  No known drug allergies. FAMILY HISTORY:  Unobtainable from the patient. SOCIAL HISTORY:  Positive for chronic alcoholism and tobacco dependence. MEDICATIONS:  1. Aspirin. 2.  Azithromycin. 3.  Librium. 4.  Flexeril. 5.  Colace. 6.  Cardura. 7.  Pepcid. 8.  Dilantin. 9.  Senna. 10.  Vitamin D1.    REVIEW OF SYSTEMS:  Unobtainable from the patient because of the  patient's confusion. PHYSICAL EXAMINATION:  VITAL SIGNS:  Temperature 99.7, respiratory rate 14, pulse initially  130, repeat was around 88, blood pressure lowest 81/51, repeat was  around 120/50, and saturating 89% initially, repeat is 100%.   CNS:  Awake but confused, does know that he is in bed, but he does not  know where he is in terms of his location. PSYCHIATRIC:  Not agitated, is not hallucinating actively. EYES:  Pupils are reactive to light. ENT:  NO oral mucosal lesions. RESPIRATORY SYSTEM:  No rales. The patient does have significant coarse  expiratory rhonchi. CVS:  S1 and S2 are heard. No murmurs or rubs. ABDOMEN:  Soft. MUSCULOSKELETAL:  No acute deformities. SKIN:  The patient has got significant redness over the left arm with  increased erythema. DIAGNOSTIC DATA:  CTA of the chest with contrast shows no evidence of  pulmonary embolism. CT head without contrast shows no acute anomaly. UA negative for infection. Urine drug screen is positive for  benzodiazepines. CBC showed white count of 14.1. Hemoglobin 8.4,  hematocrit 25.2, MCV of 103. 3. Potassium of 2.6. Chest x-ray shows  possible pneumonia. CT of the chest shows evidence of multifocal  bilateral airspace disease suggestive of pneumonia with small bilateral  pleural effusions. CONSULTATIONS:  Currently none. REVIEW OF PREVIOUS RECORDS:  Shows a 2D from 04/16/2019 showing an LV  ejection fraction of around 55 to 60% with no significant valvular  anomalies. ASSESSMENT:  1. Acute encephalopathy secondary to acute healthcare associated  nursing home acquired bacterial pneumonia with sepsis. 2.  Acute healthcare associated nursing home acquired bacterial  pneumonia with sepsis. 3.  Left arm cellulitis. 4.  Acute hypokalemia. PLAN OF CARE:  The patient has been admitted to the Internal Medicine  Service. Broad spectrum IV antibiotics have been initiated. IV  hydration will be continued. DVT prophylaxis with Lovenox. CODE STATUS:  Full. We will monitor the patient for any delayed sense  of withdrawal.    EXPECTED LENGTH OF STAY:  More than two midnights based on the plan of  care above.     RISK:  High due to the patient's presentation with the nursing acquire  pneumonia. DISPOSITION:  Admitted to the Internal Medicine Service.         Heather Miranda MD    D: 04/23/2019 5:50:09       T: 04/23/2019 7:37:22     [email or web address]  Job#: [id number]     Doc#: [id number]    CC:  <>

## 2019-04-23 NOTE — PROGRESS NOTES
4 Eyes Skin Assessment     The patient is being assess for  Admission    I agree that 2 RN's have performed a thorough Head to Toe Skin Assessment on the patient. ALL assessment sites listed below have been assessed. Areas assessed by both nurses: Nguyen/Niyah  [x]   Head, Face, and Ears   [x]   Shoulders, Back, and Chest  [x]   Arms, Elbows, and Hands   [x]   Coccyx, Sacrum, and IschIum  [x]   Legs, Feet, and Heels        Does the Patient have Skin Breakdown?   Yes LDA WOUND CARE was Initiated documentation include the Trinity-wound, Wound Assessment, Measurements, Dressing Treatment, Drainage, and Color\", DTI on right heel        Mahesh Prevention initiated:  Yes   Wound Care Orders initiated:  Yes      [address] nurse consulted for Pressure Injury (Stage 3,4, Unstageable, DTI, NWPT, and Complex wounds), New and Established Ostomies:  Yes      Nurse 1 eSignature: Electronically signed by Sunshine Larsen RN on 4/23/19 at 2:51 AM    **SHARE this note so that the co-signing nurse is able to place an eSignature**    Nurse 2 eSignature: Electronically signed by Sulma Lopez RN on 4/23/19 at 6:42 AM

## 2019-04-23 NOTE — PLAN OF CARE
Problem: Falls - Risk of:  Goal: Will remain free from falls  Description  Will remain free from falls  4/23/2019 0659 by Chele Traore RN  Outcome: Ongoing  Note:   Falling star program remains in place. Call light and personal belongings within reach. Frequent visual monitoring continues. Toileting program inplace. Patient assisted in turning/repositioning at least once every 2 hours, and on a prn basis. 4/23/2019 0655 by Chele Traore RN  Note:   Falling star program remains in place. Call light and personal belongings within reach. Frequent visual monitoring continues. Toileting program inplace. Patient assisted in turning/repositioning at least once every 2 hours, and on a prn basis. Problem: Falls - Risk of:  Goal: Absence of physical injury  Description  Absence of physical injury  Outcome: Ongoing     Problem: Safety:  Goal: Free from accidental physical injury  Description  Free from accidental physical injury  Outcome: Ongoing     Problem: Pain:  Goal: Patient's pain/discomfort is manageable  Description  Patient's pain/discomfort is manageable  Outcome: Ongoing  Note:   Pain/discomfort being managed with PRN analgesics per MD orders. Pt able to express presence and absence of pain and rate pain appropriately using numerical scale.

## 2019-04-23 NOTE — ED NOTES
Pt back from CT and currently asleep. RR even, unlabored. Wearing 2L NC. This nurse could still hear secretions in throat, so attempted to suction again and was able to successfully suction without fighting.       Sienna Nuñez RN  04/23/19 2727

## 2019-04-23 NOTE — PROGRESS NOTES
0246  Admit from ED via stretcher. Report received from [facility]. Pt to ICU 2114. Bolus of 1 liter NS almost complete. Pt awake but confused, only oriented to name. When asked who the President was, he answered Kelly. Speech garbled. Audible secretions from throat. Able to suction oral secretions but refused succeeding attempts. Encouraged to swallow saliva. Pt dozing on and off. Able to follow simple commands. At one point asked where he's at and asked why when informed that he is back in the hospital.  Unable to inform this RN what happened at the Rehab facility. R arm on a sling and R hip with dry dressing. S/P ORIF sec to fracture from falling. Bruising noted on inner R thigh and some scattered t/o. Left arm reddened, warm to touch, swollen. Denies pain when asked. Scattered  redness. Pt picks and scratches and pulls. DTI to R heel, heel mepilex applied,  EHOB's placed. Sacral mepilex for protection. Ordered IVF started. Second K replacement hanged. Unable to complete admission sec to altered mental status. Safety ensured. Bed alarm on.  0331  Dr Mony Davidson in the unit. Potassium replacement protocol obtained. Will receive total of 6 bags per the protocol. 0600  Pt awake, dangles feet from the side of the bed every now and then. Pleasantly confused. Bed alarm on.  0627  DIANA Tadeo informed of this admission via perfect serve. 0715  Report and hand off completed with Osman Marin RN. 4 eyes completed. Pt repositioned with check.

## 2019-04-23 NOTE — CONSULTS
REASON FOR CONSULTATION/CC: ams, sepsis? Consult at request of  Jen Nevarez MD     PCP: Sascha Irizarry MD  Established Pulmonologist:  None    HISTORY OF PRESENT ILLNESS: Michelle Giang is a 70y.o. year old male with a history of etohism who presents with :     He was admitted to the ICU last night. Taken to the ER yesterday secondary to altered mental status. Er noted erythema on left arm and complains of coug with phlegm with hypoxemia requiring 3L NC. He was recently admitted for ORIF after Fem fracture. PAST MEDICAL HISTORY:  Past Medical History:   Diagnosis Date    BPH (benign prostatic hyperplasia)     Seizures ([facility] [address]        PAST SURGICAL HISTORY:  Past Surgical History:   Procedure Laterality Date    FEMUR FRACTURE SURGERY Right 4/16/2019    INTRAMEDULLARY NAILING RIGHT FEMUR performed by Gee Gonzalez MD at [facility] 1978:  family history is not on file. SOCIAL HISTORY:   reports that he has been smoking cigarettes. He has been smoking about 1.50 packs per day. He has never used smokeless tobacco.    Scheduled Meds:   phenytoin  200 mg Oral BID    sodium chloride flush  10 mL Intravenous 2 times per day    enoxaparin  40 mg Subcutaneous Daily    piperacillin-tazobactam  3.375 g Intravenous Q8H    azithromycin  500 mg Intravenous Q24H    vancomycin (VANCOCIN) intermittent dosing (placeholder)   Other RX Placeholder    vancomycin  1,000 mg Intravenous Q12H    sodium chloride  1,000 mL Intravenous Once       Continuous Infusions:   lactated ringers      lactated ringers 100 mL/hr at 04/23/19 0256       PRN Meds:  sodium chloride flush, potassium chloride **OR** potassium alternative oral replacement **OR** potassium chloride    ALLERGIES:  Patient has No Known Allergies. REVIEW OF SYSTEMS: confused.    Constitutional: Negative for fever   HENT: Negative for sore throat  Eyes: Negative for redness Respiratory: Negative for dyspnea, cough  Cardiovascular: Negative for chest pain  Gastrointestinal: Negative for vomiting, diarrhea   Genitourinary: Negative for hematuria   Musculoskeletal: Negative for arthralgias   Skin: Negative for rash  Neurological: Negative for syncope  Hematological: Negative for adenopathy  Psychiatric/Behavorial: Negative for anxiety    Objective:   PHYSICAL EXAM:  Blood pressure (!) 106/37, pulse 87, temperature 97.6 °F (36.4 °C), temperature source Axillary, resp. rate 21, height 5' 9\" (1.753 m), weight 161 lb 9.6 oz (73.3 kg), SpO2 92 %.'  Gen: No distress. Eyes: PERRL. No sclera icterus. No conjunctival injection. ENT: No discharge. Pharynx clear. External appearance of ears and nose normal.  Neck: Trachea midline. No obvious mass. Resp: No accessory muscle use. No crackles. No wheezes. No rhonchi. CV: Regular rate. Regular rhythm. No murmur or rub. No edema. GI: Non-tender. Non-distended. No hernia. Skin: Warm, dry, normal texture and turgor. No nodule on exposed extremities. Lymph: No cervical LAD. No supraclavicular LAD. M/S: right arm in sling. Neuro: Moves all four extremities. Psych:confused. Data Reviewed:   LABS:  CBC:   Recent Labs     04/22/19 2110   WBC 14.1*   HGB 8.4*   HCT 25.2*   .3*        BMP:   Recent Labs     04/22/19 2110      K 2.6*      CO2 30   BUN 5*   CREATININE 0.5*     LIVER PROFILE:   Recent Labs     04/22/19 2110   AST 20   ALT 17   BILIDIR <0.2   BILITOT 0.5   ALKPHOS 83     PT/INR: No results for input(s): PROTIME, INR in the last 72 hours. APTT: No results for input(s): APTT in the last 72 hours.   UA:  Recent Labs     04/22/19 2214   COLORU DK YELLOW   PHUR 7.0  7.0   WBCUA 3-5   RBCUA 0-2   BACTERIA Rare*   CLARITYU Clear   SPECGRAV 1.018   LEUKOCYTESUR Negative   UROBILINOGEN 1.0   BILIRUBINUR SMALL*   BLOODU Negative   GLUCOSEU Negative     No results for input(s): PHART, QFV6VXF, PO2ART in the last 72 hours. Radiology Review:  Pertinent images / reports were reviewed as a part of this visit. CT Chest w/ contrast: No results found for this or any previous visit. CT Chest w/o contrast: No results found for this or any previous visit. CTPA: No results found for this or any previous visit. CXR PA/LAT: No results found for this or any previous visit. CXR portable:   Results for orders placed during the hospital encounter of 04/22/19   XR CHEST PORTABLE    Narrative EXAMINATION:  SINGLE XRAY VIEW OF THE CHEST    4/22/2019 8:35 pm    COMPARISON:  April 15    HISTORY:  ORDERING SYSTEM PROVIDED HISTORY: SOB  TECHNOLOGIST PROVIDED HISTORY:  Reason for exam:->SOB  Ordering Physician Provided Reason for Exam: sob  Acuity: Acute  Type of Exam: Initial    FINDINGS:  Heart size at the upper limits of normal.  Mediastinal contours are stable. Ill-defined infrahilar airspace opacity on the right is noted extending to  the diaphragm. There is no consolidation otherwise. Multiple rib fracture  deformities are again noted. There is no pneumothorax      Impression Ill-defined airspace opacity in the infrahilar region on the right. Findings  raise the question of pneumonia. Assessment:     Healthcare associated pneumonia, possible MRSA and gram negative pneumonia   Hx EtOH abuse  altered mental status  Cellulitis  Acute hypoxemia, saturation < 90% on room air   Bilateral pleural effusion   Anemia  Rib fracture, left status post fall. Plan:      hcap  - started on Vanc / Zosyn / Azithro  - start Lactobacillus  - Will check MRSA nasal probe. This has a good negative predictive value for MRSA pneumonia but poor positive predictive value. Therefore, if negative, will stop the vanc. - sputum and blood culture ordered  - does not need AZithro. - will change to cefepime since was treated with unasyn on last admission. Pleural effusion  - very small. Monitor.  No intervension      AMS  - was just discharged no not related to hx of EtOH. - may be secondary to sepsis  - continues to be confusion. .    - no further Benzo needed. Anemia  - Macrocytic.  secondary to etoh?  - monitor with MVI  - may need out patient work up. Prophylaxis     - DVT -  lovenox       Nutrition  - DIET GENERAL;       Will move out to med surg.             Thank you for the consult    Jud Mckeon Pulmonary, Sleep and Critical Care  [phone]

## 2019-04-24 ENCOUNTER — APPOINTMENT (OUTPATIENT)
Dept: GENERAL RADIOLOGY | Age: 64
DRG: 871 | End: 2019-04-24
Payer: MEDICARE

## 2019-04-24 PROBLEM — E44.0 MODERATE MALNUTRITION (HCC): Status: ACTIVE | Noted: 2019-04-24

## 2019-04-24 LAB
ALBUMIN SERPL-MCNC: 1.9 G/DL (ref 3.4–5)
ANION GAP SERPL CALCULATED.3IONS-SCNC: 14 MMOL/L (ref 3–16)
BASOPHILS ABSOLUTE: 0 K/UL (ref 0–0.2)
BASOPHILS RELATIVE PERCENT: 0 %
BUN BLDV-MCNC: 5 MG/DL (ref 7–20)
CALCIUM SERPL-MCNC: 7.9 MG/DL (ref 8.3–10.6)
CHLORIDE BLD-SCNC: 101 MMOL/L (ref 99–110)
CO2: 26 MMOL/L (ref 21–32)
CREAT SERPL-MCNC: <0.5 MG/DL (ref 0.8–1.3)
EKG ATRIAL RATE: 117 BPM
EKG DIAGNOSIS: NORMAL
EKG Q-T INTERVAL: 332 MS
EKG QRS DURATION: 70 MS
EKG QTC CALCULATION (BAZETT): 461 MS
EKG R AXIS: 58 DEGREES
EKG T AXIS: 223 DEGREES
EKG VENTRICULAR RATE: 116 BPM
EOSINOPHILS ABSOLUTE: 0.4 K/UL (ref 0–0.6)
EOSINOPHILS RELATIVE PERCENT: 3 %
GFR AFRICAN AMERICAN: >60
GFR NON-AFRICAN AMERICAN: >60
GLUCOSE BLD-MCNC: 97 MG/DL (ref 70–99)
HCT VFR BLD CALC: 26.7 % (ref 40.5–52.5)
HEMOGLOBIN: 8.9 G/DL (ref 13.5–17.5)
LYMPHOCYTES ABSOLUTE: 1 K/UL (ref 1–5.1)
LYMPHOCYTES RELATIVE PERCENT: 7 %
MACROCYTES: ABNORMAL
MAGNESIUM: 1.8 MG/DL (ref 1.8–2.4)
MCH RBC QN AUTO: 34.4 PG (ref 26–34)
MCHC RBC AUTO-ENTMCNC: 33.5 G/DL (ref 31–36)
MCV RBC AUTO: 102.9 FL (ref 80–100)
MONOCYTES ABSOLUTE: 1.4 K/UL (ref 0–1.3)
MONOCYTES RELATIVE PERCENT: 10 %
MRSA SCREEN RT-PCR: NORMAL
NEUTROPHILS ABSOLUTE: 11.4 K/UL (ref 1.7–7.7)
NEUTROPHILS RELATIVE PERCENT: 80 %
PDW BLD-RTO: 13.7 % (ref 12.4–15.4)
PHOSPHORUS: 2.1 MG/DL (ref 2.5–4.9)
PLATELET # BLD: 300 K/UL (ref 135–450)
PMV BLD AUTO: 9.8 FL (ref 5–10.5)
POTASSIUM REFLEX MAGNESIUM: 2.9 MMOL/L (ref 3.5–5.1)
POTASSIUM SERPL-SCNC: 3.3 MMOL/L (ref 3.5–5.1)
PROCALCITONIN: 0.27 NG/ML (ref 0–0.15)
RBC # BLD: 2.6 M/UL (ref 4.2–5.9)
RBC # BLD: NORMAL 10*6/UL
SODIUM BLD-SCNC: 141 MMOL/L (ref 136–145)
VANCOMYCIN TROUGH: 17.5 UG/ML (ref 10–20)
WBC # BLD: 14.3 K/UL (ref 4–11)

## 2019-04-24 PROCEDURE — 97110 THERAPEUTIC EXERCISES: CPT

## 2019-04-24 PROCEDURE — 80202 ASSAY OF VANCOMYCIN: CPT

## 2019-04-24 PROCEDURE — 6360000002 HC RX W HCPCS: Performed by: INTERNAL MEDICINE

## 2019-04-24 PROCEDURE — 2500000003 HC RX 250 WO HCPCS: Performed by: NURSE PRACTITIONER

## 2019-04-24 PROCEDURE — 82435 ASSAY OF BLOOD CHLORIDE: CPT

## 2019-04-24 PROCEDURE — C1751 CATH, INF, PER/CENT/MIDLINE: HCPCS

## 2019-04-24 PROCEDURE — 82040 ASSAY OF SERUM ALBUMIN: CPT

## 2019-04-24 PROCEDURE — 76937 US GUIDE VASCULAR ACCESS: CPT

## 2019-04-24 PROCEDURE — 36415 COLL VENOUS BLD VENIPUNCTURE: CPT

## 2019-04-24 PROCEDURE — 97530 THERAPEUTIC ACTIVITIES: CPT

## 2019-04-24 PROCEDURE — 71045 X-RAY EXAM CHEST 1 VIEW: CPT

## 2019-04-24 PROCEDURE — 85025 COMPLETE CBC W/AUTO DIFF WBC: CPT

## 2019-04-24 PROCEDURE — 82565 ASSAY OF CREATININE: CPT

## 2019-04-24 PROCEDURE — 84132 ASSAY OF SERUM POTASSIUM: CPT

## 2019-04-24 PROCEDURE — 84100 ASSAY OF PHOSPHORUS: CPT

## 2019-04-24 PROCEDURE — 99233 SBSQ HOSP IP/OBS HIGH 50: CPT | Performed by: INTERNAL MEDICINE

## 2019-04-24 PROCEDURE — 83735 ASSAY OF MAGNESIUM: CPT

## 2019-04-24 PROCEDURE — 80048 BASIC METABOLIC PNL TOTAL CA: CPT

## 2019-04-24 PROCEDURE — 2580000003 HC RX 258: Performed by: INTERNAL MEDICINE

## 2019-04-24 PROCEDURE — APPNB15 APP NON BILLABLE TIME 0-15 MINS: Performed by: NURSE PRACTITIONER

## 2019-04-24 PROCEDURE — 2580000003 HC RX 258: Performed by: PHARMACIST

## 2019-04-24 PROCEDURE — 97162 PT EVAL MOD COMPLEX 30 MIN: CPT

## 2019-04-24 PROCEDURE — 2580000003 HC RX 258: Performed by: NURSE PRACTITIONER

## 2019-04-24 PROCEDURE — 6360000002 HC RX W HCPCS: Performed by: PHARMACIST

## 2019-04-24 PROCEDURE — 6360000002 HC RX W HCPCS: Performed by: NURSE PRACTITIONER

## 2019-04-24 PROCEDURE — 87641 MR-STAPH DNA AMP PROBE: CPT

## 2019-04-24 PROCEDURE — 82310 ASSAY OF CALCIUM: CPT

## 2019-04-24 PROCEDURE — 84295 ASSAY OF SERUM SODIUM: CPT

## 2019-04-24 PROCEDURE — 36569 INSJ PICC 5 YR+ W/O IMAGING: CPT

## 2019-04-24 PROCEDURE — 82947 ASSAY GLUCOSE BLOOD QUANT: CPT

## 2019-04-24 PROCEDURE — 82374 ASSAY BLOOD CARBON DIOXIDE: CPT

## 2019-04-24 PROCEDURE — 1200000000 HC SEMI PRIVATE

## 2019-04-24 PROCEDURE — 92610 EVALUATE SWALLOWING FUNCTION: CPT

## 2019-04-24 PROCEDURE — 84145 PROCALCITONIN (PCT): CPT

## 2019-04-24 PROCEDURE — 84520 ASSAY OF UREA NITROGEN: CPT

## 2019-04-24 RX ORDER — SODIUM CHLORIDE 9 MG/ML
INJECTION, SOLUTION INTRAVENOUS CONTINUOUS
Status: DISCONTINUED | OUTPATIENT
Start: 2019-04-24 | End: 2019-04-27

## 2019-04-24 RX ORDER — PHENYTOIN SODIUM 50 MG/ML
100 INJECTION, SOLUTION INTRAMUSCULAR; INTRAVENOUS EVERY 6 HOURS
Status: DISCONTINUED | OUTPATIENT
Start: 2019-04-24 | End: 2019-04-24

## 2019-04-24 RX ORDER — SODIUM CHLORIDE 0.9 % (FLUSH) 0.9 %
10 SYRINGE (ML) INJECTION EVERY 12 HOURS SCHEDULED
Status: DISCONTINUED | OUTPATIENT
Start: 2019-04-24 | End: 2019-05-04

## 2019-04-24 RX ORDER — POTASSIUM CHLORIDE 29.8 MG/ML
20 INJECTION INTRAVENOUS PRN
Status: DISCONTINUED | OUTPATIENT
Start: 2019-04-24 | End: 2019-05-07 | Stop reason: HOSPADM

## 2019-04-24 RX ORDER — LIDOCAINE HYDROCHLORIDE 10 MG/ML
5 INJECTION, SOLUTION EPIDURAL; INFILTRATION; INTRACAUDAL; PERINEURAL ONCE
Status: DISCONTINUED | OUTPATIENT
Start: 2019-04-24 | End: 2019-04-25

## 2019-04-24 RX ORDER — SODIUM CHLORIDE 0.9 % (FLUSH) 0.9 %
10 SYRINGE (ML) INJECTION PRN
Status: DISCONTINUED | OUTPATIENT
Start: 2019-04-24 | End: 2019-05-04

## 2019-04-24 RX ADMIN — POTASSIUM CHLORIDE 10 MEQ: 7.46 INJECTION, SOLUTION INTRAVENOUS at 15:28

## 2019-04-24 RX ADMIN — POTASSIUM CHLORIDE 10 MEQ: 7.46 INJECTION, SOLUTION INTRAVENOUS at 14:26

## 2019-04-24 RX ADMIN — SODIUM CHLORIDE: 9 INJECTION, SOLUTION INTRAVENOUS at 12:45

## 2019-04-24 RX ADMIN — POTASSIUM CHLORIDE 10 MEQ: 7.46 INJECTION, SOLUTION INTRAVENOUS at 12:59

## 2019-04-24 RX ADMIN — POTASSIUM CHLORIDE 10 MEQ: 7.46 INJECTION, SOLUTION INTRAVENOUS at 11:03

## 2019-04-24 RX ADMIN — ENOXAPARIN SODIUM 40 MG: 40 INJECTION SUBCUTANEOUS at 10:58

## 2019-04-24 RX ADMIN — THIAMINE HYDROCHLORIDE 100 MG: 100 INJECTION, SOLUTION INTRAMUSCULAR; INTRAVENOUS at 11:48

## 2019-04-24 RX ADMIN — SODIUM CHLORIDE: 9 INJECTION, SOLUTION INTRAVENOUS at 11:00

## 2019-04-24 RX ADMIN — VANCOMYCIN HYDROCHLORIDE 1000 MG: 1 INJECTION, POWDER, LYOPHILIZED, FOR SOLUTION INTRAVENOUS at 11:14

## 2019-04-24 RX ADMIN — PHENYTOIN SODIUM 100 MG: 50 INJECTION INTRAMUSCULAR; INTRAVENOUS at 17:54

## 2019-04-24 RX ADMIN — PHENYTOIN SODIUM 100 MG: 50 INJECTION INTRAMUSCULAR; INTRAVENOUS at 13:38

## 2019-04-24 RX ADMIN — CEFEPIME 2 G: 2 INJECTION, POWDER, FOR SOLUTION INTRAVENOUS at 11:05

## 2019-04-24 RX ADMIN — Medication 10 ML: at 20:24

## 2019-04-24 RX ADMIN — Medication 10 ML: at 20:25

## 2019-04-24 RX ADMIN — POTASSIUM PHOSPHATE, MONOBASIC AND POTASSIUM PHOSPHATE, DIBASIC 20 MMOL: 224; 236 INJECTION, SOLUTION, CONCENTRATE INTRAVENOUS at 11:52

## 2019-04-24 RX ADMIN — POTASSIUM CHLORIDE 20 MEQ: 29.8 INJECTION, SOLUTION INTRAVENOUS at 21:55

## 2019-04-24 RX ADMIN — POTASSIUM CHLORIDE 10 MEQ: 7.46 INJECTION, SOLUTION INTRAVENOUS at 06:46

## 2019-04-24 RX ADMIN — POTASSIUM CHLORIDE 10 MEQ: 7.46 INJECTION, SOLUTION INTRAVENOUS at 11:53

## 2019-04-24 RX ADMIN — POTASSIUM CHLORIDE 20 MEQ: 29.8 INJECTION, SOLUTION INTRAVENOUS at 23:03

## 2019-04-24 RX ADMIN — CEFEPIME 2 G: 2 INJECTION, POWDER, FOR SOLUTION INTRAVENOUS at 22:09

## 2019-04-24 RX ADMIN — Medication 10 ML: at 11:20

## 2019-04-24 ASSESSMENT — PAIN SCALES - GENERAL
PAINLEVEL_OUTOF10: 0

## 2019-04-24 NOTE — PROGRESS NOTES
285 300     BMP:   Recent Labs     04/22/19  2110 04/23/19  0845 04/24/19  0456    144 141   K 2.6* 3.2* 2.9*    106 101   CO2 30 30 26   PHOS  --  2.8 2.1*   BUN 5* 4* 5*   CREATININE 0.5* <0.5* <0.5*     LIVER PROFILE:   Recent Labs     04/22/19 2110   AST 20   ALT 17   BILIDIR <0.2   BILITOT 0.5   ALKPHOS 83     PT/INR: No results for input(s): PROTIME, INR in the last 72 hours. APTT: No results for input(s): APTT in the last 72 hours. UA:  Recent Labs     04/22/19 2214   COLORU DK YELLOW   PHUR 7.0  7.0   WBCUA 3-5   RBCUA 0-2   BACTERIA Rare*   CLARITYU Clear   SPECGRAV 1.018   LEUKOCYTESUR Negative   UROBILINOGEN 1.0   BILIRUBINUR SMALL*   BLOODU Negative   GLUCOSEU Negative     No results for input(s): PH, PCO2, PO2 in the last 72 hours. Cx:      Films:  Radiology Review:  Pertinent images / reports were reviewed as a part of this visit. CT Chest w/ contrast: No results found for this or any previous visit. CT Chest w/o contrast: No results found for this or any previous visit. CTPA: No results found for this or any previous visit. CXR PA/LAT: No results found for this or any previous visit. CXR portable:   Results for orders placed during the hospital encounter of 04/22/19   XR CHEST PORTABLE    Narrative EXAMINATION:  SINGLE XRAY VIEW OF THE CHEST    4/22/2019 8:35 pm    COMPARISON:  April 15    HISTORY:  ORDERING SYSTEM PROVIDED HISTORY: SOB  TECHNOLOGIST PROVIDED HISTORY:  Reason for exam:->SOB  Ordering Physician Provided Reason for Exam: sob  Acuity: Acute  Type of Exam: Initial    FINDINGS:  Heart size at the upper limits of normal.  Mediastinal contours are stable. Ill-defined infrahilar airspace opacity on the right is noted extending to  the diaphragm. There is no consolidation otherwise. Multiple rib fracture  deformities are again noted. There is no pneumothorax      Impression Ill-defined airspace opacity in the infrahilar region on the right. Findings  raise the question of pneumonia. Assessment:     Healthcare associated pneumonia, possible MRSA and gram negative pneumonia   Hx EtOH abuse  altered mental status  Cellulitis  Acute hypoxemia, saturation < 90% on room air   Bilateral pleural effusion   Anemia  Rib fracture, left status post fall. Plan:      hcap  -  Vanc / Cefepime  -  Lactobacillus  - Will check MRSA nasal probe. This has a good negative predictive value for MRSA pneumonia but poor positive predictive value. Therefore, if negative, will stop the vanc. - sputum and blood culture ordere         Pleural effusion  - very small. Monitor. No intervension        AMS  - continues to be confusion. .    - no further Benzo needed. - wernicke encephalopathy at baseline.   - This may be baseline      Anemia  - Macrocytic.  secondary to etoh?  - monitor with MVI  - may need out patient work up.                Left arm  - rule out DVT in arm.                 Jud Mckeon Pulmonary, Sleep and [facility] [phone]

## 2019-04-24 NOTE — CARE COORDINATION
[address] [id number] Continence Nurse  Consult Note       NAME:  Sherlyn Mendez  MEDICAL RECORD NUMBER:  [id number]  AGE: 70 y.o. GENDER: male  : 1955  TODAY'S DATE:  2019    Subjective   Reason for WOCN Evaluation and Assessment: to evaluate and treat \"DTI R heel\"       Sherlyn Mendez is a 70 y.o. male referred by:   [x] Physician  [x] Nursing  [] Other:     Wound Identification:   Wound Type: pressure  Contributing Factors: chronic pressure, decreased mobility and shear force    Wound History: admitted with altered mental status and s/p ORIF after a femoral fracture repair last admission. Right heel assessed. Intact deep tissue pressure injury noted and will likely resolve with proper offloading. See wound details, measurements, and plan of care below. Patient Goal of Care:  [x] Wound Healing  [] Odor Control  [] Palliative Care  [] Pain Control   [] Other:         PAST MEDICAL HISTORY        Diagnosis Date    BPH (benign prostatic hyperplasia)     [facility]        PAST SURGICAL HISTORY    Past Surgical History:   Procedure Laterality Date    FEMUR FRACTURE SURGERY Right 2019    INTRAMEDULLARY NAILING RIGHT FEMUR performed by Marcin Granados MD at [facility]     No family history on file. SOCIAL HISTORY    Social History     Tobacco Use    Smoking status: Current Every Day Smoker     Packs/day: 1.50     Types: Cigarettes    Smokeless tobacco: Never Used   Substance Use Topics    Alcohol use: Yes     Alcohol/week: 3.6 oz     Types: 6 Cans of beer per week     Comment: Pt states \"3 bottles of beer daily. \"     Drug use: Not Currently       ALLERGIES    No Known Allergies    MEDICATIONS    No current facility-administered medications on file prior to encounter.       Current Outpatient Medications on File Prior to Encounter   Medication Sig Dispense Refill    chlordiazePOXIDE (LIBRIUM) 5 MG capsule Take 1 capsule by mouth 3 times daily as needed for Anxiety for up to 3 days. 9 capsule 0    ipratropium-albuterol (DUONEB) 0.5-2.5 (3) MG/3ML SOLN nebulizer solution Inhale 3 mLs into the lungs every 4 hours (while awake) 557 mL 0    folic acid (FOLVITE) 1 MG tablet Take 1 tablet by mouth daily 30 tablet 3    docusate sodium (COLACE, DULCOLAX) 100 MG CAPS Take 100 mg by mouth 2 times daily 30 capsule 0    senna (SENOKOT) 8.6 MG tablet Take 1 tablet by mouth nightly 30 tablet 0    azithromycin (ZITHROMAX) 250 MG tablet Take 1 tablet by mouth daily for 3 days 3 tablet 0    cyclobenzaprine (FLEXERIL) 10 MG tablet Take 1 tablet by mouth 3 times daily as needed for Muscle spasms 30 tablet 0    vitamin B-1 100 MG tablet Take 1 tablet by mouth daily 30 tablet 3    [START ON 5/2/2019] aspirin 325 MG EC tablet Take 1 tablet by mouth daily for 14 days Begin after Lovenox dosing completed for DVT prophylaxis 14 tablet 0    oxyCODONE-acetaminophen (PERCOCET) 5-325 MG per tablet Take 1-2 tablets by mouth every 4 hours as needed for Pain for up to 7 days.  60 tablet 0    enoxaparin (LOVENOX) 40 MG/0.4ML injection Inject 0.4 mLs into the skin nightly for 14 days 13 Syringe 0    doxazosin (CARDURA) 4 MG tablet Take 4 mg by mouth nightly      phenytoin (DILANTIN) 100 MG ER capsule Take 200 mg by mouth 2 times daily       famotidine (PEPCID) 20 MG tablet Take 1 tablet by mouth 2 times daily 20 tablet 0       Objective    BP (!) 127/49   Pulse 112   Temp 100.2 °F (37.9 °C) (Oral)   Resp 26   Ht 5' 9\" (1.753 m)   Wt 167 lb 1.7 oz (75.8 kg)   SpO2 97%   BMI 24.68 kg/m²     LABS:  WBC:    Lab Results   Component Value Date    WBC 14.3 04/24/2019     H/H:    Lab Results   Component Value Date    HGB 8.9 04/24/2019    HCT 26.7 04/24/2019     PTT:    Lab Results   Component Value Date    APTT 30.6 04/15/2019   [APTT}  PT/INR:    Lab Results   Component Value Date    PROTIME 11.6 04/15/2019    INR 1.02 04/15/2019     HgBA1c:  No results found for: LABA1C    Assessment   Mahesh Risk Score: Mahesh Scale Score: 14 at risk     Patient Active Problem List   Diagnosis Code    Fall W19. XXXA    Right humeral fracture S42.301A    Right femoral fracture (HCC) S72.91XA    Alcohol intoxication ([facility] [address] F10.929    Hypomagnesemia E83.42    Epilepsia ([facility] [address] G40.909    Closed fracture of neck of right femur (HCC) S72.001A    Alcohol dependence ([facility]) F10.20    Multiple rib fractures S22.49XA    Syncope R55    Shortness of breath R06.02    Bronchitis J40    Tobacco abuse Z72.0    ETOH abuse F10.10    Severe sepsis ([facility]) A41.9, R65.20    HAP (hospital-acquired pneumonia) J18.9    Septicemia ([facility]) A41.9    Cellulitis of left upper extremity L03.114    Hypokalemia E87.6    Leukemoid reaction D72.823       Measurements:  Wound 04/23/19 Heel Right (Active)   Wound Deep tissue/Injury 4/24/2019 10:01 AM   Dressing Status Changed 4/24/2019 10:01 AM   Dressing Changed Changed/New 4/24/2019 10:01 AM   Dressing/Treatment Barrier Film 4/24/2019 10:01 AM   Dressing Change Due 04/24/19 4/24/2019 10:01 AM   Wound Length (cm) 4.5 cm 4/24/2019 10:01 AM   Wound Width (cm) 5 cm 4/24/2019 10:01 AM   Wound Depth (cm) 0 cm 4/24/2019 10:01 AM   Wound Surface Area (cm^2) 22.5 cm^2 4/24/2019 10:01 AM   Wound Volume (cm^3) 0 cm^3 4/24/2019 10:01 AM   Wound Assessment Dry; Intact; Purple;Light purple;Red 4/24/2019 10:01 AM   Drainage Amount None 4/24/2019 10:01 AM   Trinity-wound Assessment Dry; Intact; Non-blanchable erythema 4/24/2019 10:01 AM   Red%Wound Bed 50 4/24/2019 10:01 AM   Purple%Wound Bed 50 4/24/2019 10:01 AM   Number of days: 1     Response to treatment:  Well tolerated by patient. Pain Assessment:  Severity:  0 / 10  Quality of pain: N/A  Wound Pain Timing/Severity: none  Premedicated: No    Plan   Plan of Care: Wound 04/23/19 Heel Right-Dressing/Treatment: Barrier Film     1. Implement Mahesh prevention orders. See orders for prevention order details   2.  Implement Wound Care orders. See wound care orders for dressing change instructions   Right heel: Apply SurePrep Rapid Dry No Sting Barrier Film wipes to right heel twice a day. Do Not Cover with dressing leave open to air. 3. Will continue to follow   4. Apply Medline Boots (black) to BLE while in bed at all times and/ or when heels resting on surface. (ex. recliner chair foot rest). Check heel placement in heel protector boot every 2 hours   5. Use the Wedge Pillow to reposition the patient while in bed.    Keep Patient OFF BACK while in bed   Turn patient to left and right lying positions while in bed     Specialty Bed Required : Yes   [x] Low Air Loss   [x] Pressure Redistribution ICU mattress   [] Fluid Immersion  [] Bariatric  [] Total Pressure Relief  [] Other:     Current Diet: DIET GENERAL;  Dietician consult:  No    Patient/Caregiver Teaching:  Level of patient/caregiver understanding able to: Spoke with bedside nurse Gonzalez Haney about plan of care   [x] Indicates understanding       [x] Needs reinforcement  [] Unsuccessful      [] Verbal Understanding  [] Demonstrated understanding       [] No evidence of learning  [] Refused teaching         [] N/A       Electronically signed by Michele Stokes RN, [address] on 4/24/2019 at 10:03 AM

## 2019-04-24 NOTE — PROGRESS NOTES
4 Eyes Skin Assessment     The patient is being assess for  Shift Handoff    I agree that 2 RN's have performed a thorough Head to Toe Skin Assessment on the patient. ALL assessment sites listed below have been assessed. Areas assessed by both nurses: yes  [x]   Head, Face, and Ears   [x]   Shoulders, Back, and Chest  [x]   Arms, Elbows, and Hands   [x]   Coccyx, Sacrum, and IschIum  [x]   Legs, Feet, and Heels        Does the Patient have Skin Breakdown?   Yes LDA WOUND CARE was Initiated documentation include the Trinity-wound, Wound Assessment, Measurements, Dressing Treatment, Drainage, and Color\",         Mahesh Prevention initiated:  Yes   Wound Care Orders initiated:  Yes      [address]  nurse consulted for Pressure Injury (Stage 3,4, Unstageable, DTI, NWPT, and Complex wounds), New and Established Ostomies:  Yes      Nurse 1 eSignature: Electronically signed by Joe Medina RN on 4/24/19 at 8:24 AM    **SHARE this note so that the co-signing nurse is able to place an eSignature**    Nurse 2 eSignature: Electronically signed by Nick Myers RN on 4/24/19 at 3:27 PM

## 2019-04-24 NOTE — PROGRESS NOTES
Speech Language Pathology  Facility/Department: [facility] 6K ICU   BEDSIDE SWALLOW EVALUATION    NAME: Deb Em  : 1955  MRN: [id number]    ADMISSION DATE: 2019  ADMITTING DIAGNOSIS: has Fall; Right humeral fracture; Right femoral fracture ([address] Alcohol intoxication ([address] Hypomagnesemia; Epilepsia ([address] Closed fracture of neck of right femur ([address] Alcohol dependence ([address] Multiple rib fractures; Syncope; Shortness of breath; Bronchitis; Tobacco abuse; ETOH abuse; Severe sepsis ([address] HAP (hospital-acquired pneumonia); Septicemia ([address] Cellulitis of left upper extremity; Hypokalemia; Leukemoid reaction; and Moderate malnutrition ([address] on their problem list.  ONSET DATE: 19    Recent Chest Xray19     Findings concerning for worsening edema or infection on the right. HPI: (Taken from ER MD) This 58-year-old  male presenting  from nursing home with increasing confusion status post discharge today  with the patient apparently was found to be increasingly confused at the  [facility] and was found to have left arm redness along with significant  cough. The patient is significantly confused unable to provide history. ASSESSMENT:  1. Acute encephalopathy secondary to acute healthcare associated  nursing home acquired bacterial pneumonia with sepsis. 2.  Acute healthcare associated nursing home acquired bacterial  pneumonia with sepsis. 3.  Left arm cellulitis. 4.  Acute hypokalemia. Dysphagia Hx:   MBS 19:  Oral Phase: Moderate oral stage dysphagia characterized by decreased mastication and decreased lingual manipulation which are both compounded by lethargy. Prolonged mastication with solids with concern for excess labor with regular solids which were not administered d/t concern for excess labor. Prolonged, disorganized bolus formation and movement with all trials with reduced bolus cohesion and control and premature bolus loss to the pharynx.   Oral residue with all trials is minimized with cued repeat swallow.   Pharyngeal: Moderate oral stage dysphagia characterized by delayed swallow, decreased laryngeal elevation, decreased pharyngeal peristalsis. Premature spillage to the valleculae with overflow to the hypopharynx with all trials. Vallecular and pyriform residue with all trials is minimized with cued repeat swallow. Flash penetration with thin. Suspect significantly improved swallow function as mental status improves. Recommended Diet:  Solid consistency: Dysphagia III Advanced  Liquid consistency: Thin    Date of Eval: 4/24/2019  Evaluating Therapist: Mark Munson    Current Diet level:  Current Diet : NPO  Current Liquid Diet : NPO      Primary Complaint  Patient Complaint: pt requesting PO, no awareness of current medical condition/complications or recall of dysphagia hx from last week    Pain:  Pain Assessment  Pain Assessment: CPOT  Pain Level: 0    Reason for Referral  Darron Lewis was referred for a bedside swallow evaluation to assess the efficiency of his swallow function, identify signs and symptoms of aspiration and make recommendations regarding safe dietary consistencies, effective compensatory strategies, and safe eating environment. Impression  Dysphagia Diagnosis: Moderate oral stage dysphagia; Moderate to severe pharyngeal stage dysphagia  Dysphagia Impression :   · Pt awake and alert but confused and impulsive with reduced safety awareness and ability to consistently follow instructions for safe swallow strategies. · Reduced bolus formation and manipulation with reduced AP transit resulting in high risk for premature spillage to pharynx with all trials;   · variable oral hold with all liquid viscosities;   · suspected moderate swallow delay and decreased laryngeal elevation with moderately reduced pharyngeal transit, resulting in delayed cough with honey liquid via cup and throat clearing with ice chips.     · Pt appears to safely tolerate puree consistencies 7/7 trials with no overt s/s of aspiration. Dysphagia Outcome Severity Scale: Level 2: Moderate Severe dysphagia- Maximum assistance or maximum use of strategies with partial PO only     Treatment Plan  Requires SLP Intervention: Yes  Duration/Frequency of Treatment: 3-5x/wk while on acute unit  D/C Recommendations: To be determined       Recommended Diet and Intervention  Diet Solids Recommendation: Dysphagia I Pureed  Liquid Consistency Recommendation: Pudding  Recommended Form of Meds: Crushed in puree as able  Recommendations: Dysphagia treatment  Therapeutic Interventions: Therapeutic PO trials with SLP; Diet tolerance monitoring; Laryngeal exercises; Pharyngeal exercises;Oral motor exercises    Compensatory Swallowing Strategies  Compensatory Swallowing Strategies: Upright as possible for all oral intake;Remain upright for 30-45 minutes after meals; Total feed;Eat/Feed slowly; Swallow 2 times per bite/sip;Small bites/sips    Treatment/Goals  Short-term Goals  Timeframe for Short-term Goals: 1wk  Dysphagia Goals: The patient will tolerate recommended diet without observed clinical signs of aspiration; The patient/caregiver will demonstrate understanding of compensatory strategies for improved swallowing safety. ;The patient will improve oral motor function via therapeutic oral motor exercises to 5/5 each trial.;The patient will tolerate honey-thick liquids without signs and symptoms of aspiration 10/10 via cup. General  Chart Reviewed: Yes  Behavior/Cognition: Alert; Cooperative;Confused; Impulsive; Requires cueing  Respiratory Status: Room air  Breath Sounds: Wet; Rhonchi  Follows Directions: Simple  Dentition: Adequate  Patient Positioning: Upright in bed  Baseline Vocal Quality: Wet  Volitional Cough: Congested  Volitional Swallow: Delayed  Consistencies Administered: Puree;Nectar - teaspoon; Ice Chips;Honey - cup;Honey - teaspoon       Vision/Hearing  Vision  Vision: (wears glasses)  Vision Exceptions: Wears glasses at all times  Hearing  Hearing: Within functional limits    Oral Motor Deficits  Oral/Motor  Oral Motor: Exceptions to [facility]  Labial Strength: Reduced  Labial Coordination: Reduced  Lingual Strength: Reduced  Lingual Coordination: Reduced    Oral Phase Dysfunction  Oral Phase  Oral Phase: Exceptions  Oral Phase Dysfunction  Decreased Anterior to Posterior Transit: All  Suspected Premature Bolus Loss: All  Oral Phase  Oral Phase - Comment: Weak lingual manipulation and AP transit with variable oral hold of liquids; concern for premature spillage to pharynx with all trials     Indicators of Pharyngeal Phase Dysfunction   Pharyngeal Phase  Pharyngeal Phase: Exceptions  Indicators of Pharyngeal Phase Dysfunction  Delayed Swallow: All  Decreased Laryngeal Elevation: All  Wet Vocal Quality: All  Throat Clearing - Delayed: Ice chips  Cough - Delayed: Honey - cup;Ice chips  Pharyngeal Phase   Pharyngeal: Moderate to severely reduced pharyngeal phases 2/2 swallow delay and decreased laryngeal elevation; concern for reduced airway protection, pooling and residue with high risk of aspiration before, during and after the swallow with all liquid consistencies. Inconsistent overt s/s aspiration (throat clear and/or strong cough) with ice chips and honey liquid via cup. No overt s/s with puree     Prognosis  Prognosis  Prognosis for safe diet advancement: guarded  Barriers to reach goals: cognitive deficits;severity of dysphagia;behavior    Education  Patient Education: results and recs  Patient Education Response: Verbalizes understanding;Needs reinforcement     Type of devices:  All fall risk precautions in place        Therapy Time  SLP Individual Minutes  Time In: 1520  Time Out: 1600  Minutes: [address], [address]  4/24/2019 4:07 PM

## 2019-04-24 NOTE — PROGRESS NOTES
1920: Handoff report with Osman Marin RN. Pt pullout peripheral IVs during day shift and no IV at this time  2045: Shift Assessment completed. VSS stable. Pt is oriented to person but disoriented to time, place and situation. Fall precaution in place, bed alarm on and call light and other belongings within reach. Pt is okay to take is evening meds at this time. 2300: Pt's has two new peripheral IVs placed at this time. 0210: Pt took off two peripheral IVs and hospitalist selam served and updated. See new orders. 0300: Pt placed in restraint at this time. Janice Bolivar 0400:Reassessment completed. VSS stable. Pt is still confused. 0550: pt has small BM, cleaned and bedpad changed. 0645: K replacement started. 0710: handoff report given to Marjorie Sol RN. Bedside handoff done.    Electronically signed by Magnolia Dale RN on 4/24/2019 at 8:14 AM

## 2019-04-24 NOTE — PROGRESS NOTES
Physical Therapy    Facility/Department: [address] ICU  Initial Assessment    NAME: Quinn Wilson  : 1955  MRN: [id number]    Date of Service: 2019    Discharge Recommendations:  Continue to assess pending progress   PT Equipment Recommendations  Other: Defer to next level of care. Quinn Wilson scored a 7/ on the AM-PAC short mobility form. Current research shows that an AM-PAC score of 17 or less is typically not associated with a discharge to the patient's home setting. Based on the patients AM-PAC score and their current functional mobility deficits, it is recommended that the patient have 3-5 sessions per week of Physical Therapy at d/c to increase the patients independence. Assessment   Body structures, Functions, Activity limitations: Decreased functional mobility ; Decreased strength;Decreased safe awareness;Decreased cognition;Decreased endurance  Assessment: 71 y/o male return to ER 19, few hrs after d/c to SNF setting, with Septicemia, Cellulitis L UE.  L UE Dopplers pending. Recent Hospital Admit -19 with R Humerus Fx (Medically Manage) and R Femur Fx (ORIF/IM Nail 19, Dr Malik Leon), 2 Rib Fxs (Displaced L 7th and 9th) following Fall at Home (ETOH). PMH as noted also including H/O Skull Fx/Elevation, ETOH. Anticipate return to SNF setting with cont PT Intervention. Prognosis: Fair  Decision Making: Medium Complexity  History: 71 y/o male return to ER 19, few hrs after d/c to SNF setting, with Septicemia, Cellulitis L UE.  L UE Dopplers pending. Recent Hospital Admit -19 with R Humerus Fx (Medically Manage) and R Femur Fx (ORIF/IM Nail 19, Dr aMlik Leon), 2 Rib Fxs (Displaced L 7th and 9th) following Fall at Home (ETOH). PMH as noted also including H/O Skull Fx/Elevation, ETOH. Exam: See above. Clinical Presentation: See above. Patient Education: Role of PT, POC, Need to call for assist.   Barriers to Learning: Cognitive.   REQUIRES PT Oriented to person(Pt somewhat disoriented to recent events/situation.  )  Social/Functional History  Social/Functional History  Lives With: Alone  Type of Home: Mobile home  Home Layout: One level  Home Access: Stairs to enter with rails  Bathroom Toilet: Standard  ADL Assistance: Independent  Ambulation Assistance: Independent  Transfer Assistance: Independent  Active : Yes  Additional Comments: Pt able to provide above information to PT, unsure extent of accuracy. PTA pt d/c from acute care to SNF setting. Cognition        Objective     Observation/Palpation  Observation: Upon PT arrival R UE not in sling, nursing reports pt had been taking it off. AROM RLE (degrees)  RLE General AROM: AAROM Hip/Knee to 60 degrees, gentle/slow. Ankle WFLs. AROM LLE (degrees)  LLE AROM : WFL  AROM RUE (degrees)  RUE General AROM: PT/Nursing applied Sling/Swathe to R UE.    AROM LUE (degrees)  LUE AROM : WFL  Strength RLE  Comment: N/T at this time. Strength LLE  Strength LLE: WFL  Strength RUE  Strength RUE: WFL  Strength LUE  Comment: N/T at this time. Sensation  Overall Sensation Status: WFL(Pt denies numb/tingling. )  Bed mobility  Rolling to Left: Dependent/Total  Rolling to Right: Dependent/Total  Scooting: Dependent/Total  Comment: Dependent Rolling R/L and Scooting to [facility] for care, sling/swathe applic and repositioning. Care taken in hand place due to R UE Fx/Immob. Transfers  Bed to Chair: Unable to assess  Comment: Defer any EOB/OOB at this time. Exercises  Comments: Ben GARSIA LE. Plan   Plan  Times per week: 3-5x week while in acute care setting.    Current Treatment Recommendations: Strengthening, Functional Mobility Training, Transfer Training, Safety Education & Training, Patient/Caregiver Education & Training  Safety Devices  Type of devices: Bed alarm in place, Call light within reach, Left in bed, Nurse notified    G-Code       OutComes Score AM-PAC Score  AM-PAC Inpatient Mobility Raw Score : 7  AM-PAC Inpatient T-Scale Score : 26.42  Mobility Inpatient CMS 0-100% Score: 92.36  Mobility Inpatient CMS G-Code Modifier : CM          Goals  Short term goals  Time Frame for Short term goals: Upon d/c acute care setting. Short term goal 1: Bed Mob Mod assist x 2. Short term goal 2: Attempt Transfer to/from chair with lift equipt/assist device, NWB R UE/50% WB R LE with assist x 2-3. Patient Goals   Patient goals : None stated.         Therapy Time   Individual Concurrent Group Co-treatment   Time In 1130         Time Out 1230         Minutes [facility] Hermilo Shamar

## 2019-04-24 NOTE — PROGRESS NOTES
Progress Note  Admit Date: 2019      PCP: Jose Hinton MD     CC: F/U for pneumonia    SUBJECTIVE / Interval History:  Still confused  Unable to swallow    Has worsening L hand pain       Allergies  Patient has no known allergies. Medications    Scheduled Meds:   lidocaine 1 % injection  5 mL Intradermal Once    sodium chloride flush  10 mL Intravenous 2 times per day    potassium phosphate IVPB  20 mmol Intravenous Once    phenytoin  200 mg Oral BID    sodium chloride flush  10 mL Intravenous 2 times per day    enoxaparin  40 mg Subcutaneous Daily    vancomycin (VANCOCIN) intermittent dosing (placeholder)   Other RX Placeholder    vancomycin  1,000 mg Intravenous Q12H    lactobacillus  2 capsule Oral BID    multivitamin  1 tablet Oral Daily    thiamine  100 mg Oral Daily    folic acid  1 mg Oral Daily    cefepime  2 g Intravenous Q12H    QUEtiapine  25 mg Oral BID     Continuous Infusions:    PRN Meds:  sodium chloride flush, sodium chloride flush, potassium chloride **OR** potassium alternative oral replacement **OR** potassium chloride, magnesium sulfate, haloperidol lactate    Vitals    TEMPERATURE:  Current - Temp: 100.2 °F (37.9 °C); Max - Temp  Av.3 °F (37.4 °C)  Min: 98.1 °F (36.7 °C)  Max: 100.2 °F (37.9 °C)  RESPIRATIONS RANGE: Resp  Av  Min: 19  Max: 26  PULSE RANGE: Pulse  Av.8  Min: 111  Max: 121  BLOOD PRESSURE RANGE:  Systolic (73NJX), ALP:061 , Min:115 , XCT:994   ; Diastolic (20QLB), YV, Min:49, Max:72    PULSE OXIMETRY RANGE: SpO2  Av.8 %  Min: 92 %  Max: 97 %  24HR INTAKE/OUTPUT:      Intake/Output Summary (Last 24 hours) at 2019 1032  Last data filed at 2019 0630  Gross per 24 hour   Intake 1060 ml   Output 975 ml   Net 85 ml       Exam:    Gen: No distress. Eyes: PERRL. No sclera icterus. No conjunctival injection. ENT: No discharge. Pharynx clear. External appearance of ears and nose normal.  Neck: Trachea midline.  No obvious mass.    Resp: No accessory muscle use. No crackles. No wheezes. + rhonchi. No dullness on percussion. CV: Regular rate. Regular rhythm. No murmur or rub. No edema. GI: Non-tender. Non-distended. No hernia. Skin: L arm rash + , worsening swelling  L arm   Lymph: No cervical LAD. No supraclavicular LAD. M/S: No cyanosis. No clubbing. No joint deformity. Neuro: Moves all four extremities. CN 2-12 tested, no defect noted. Psych: confused  Data    LABS  CBC:   Recent Labs     04/22/19 2110 04/23/19  0845 04/24/19  0456   WBC 14.1* 12.8* 14.3*   HGB 8.4* 9.2* 8.9*   HCT 25.2* 27.0* 26.7*   .3* 102.3* 102.9*    285 300     BMP:   Recent Labs     04/22/19 2110 04/23/19  0845 04/24/19  0456    144 141   K 2.6* 3.2* 2.9*    106 101   CO2 30 30 26   PHOS  --  2.8 2.1*   BUN 5* 4* 5*   CREATININE 0.5* <0.5* <0.5*     POC GLUCOSE:  No results for input(s): POCGLU in the last 72 hours. LIVER PROFILE:   Recent Labs     04/22/19 2110 04/23/19 0845 04/24/19  0456   AST 20  --   --    ALT 17  --   --    LABALBU 1.9* 2.2* 1.9*   BILIDIR <0.2  --   --    BILITOT 0.5  --   --    ALKPHOS 83  --   --      PT/INR: No results for input(s): PROTIME, INR in the last 72 hours. APTT: No results for input(s): APTT in the last 72 hours. UA:  Recent Labs     04/22/19 2214   COLORU DK YELLOW   PHUR 7.0  7.0   WBCUA 3-5   RBCUA 0-2   BACTERIA Rare*   CLARITYU Clear   SPECGRAV 1.018   LEUKOCYTESUR Negative   UROBILINOGEN 1.0   BILIRUBINUR SMALL*   BLOODU Negative   GLUCOSEU Negative   KETUA 15*     Microbiology:  Wound Culture: No results for input(s): WNDABS, ORG in the last 72 hours. Invalid input(s):  LABGRAM  Nasal Culture: No results for input(s): ORG, MRSAPCR in the last 72 hours. Blood Culture:   Recent Labs     04/22/19 2110 04/22/19 2143   BC No Growth to date. Any change in status will be called. --    BLOODCULT2  --  No Growth to date. Any change in status will be called.      Fungal Culture:   No results for input(s): FUNGSM in the last 72 hours. No results for input(s): FUNCXBLD in the last 72 hours. CSF Culture:  No results for input(s): COLORCSF, APPEARCSF, CFTUBE, CLOTCSF, WBCCSF, RBCCSF, NEUTCSF, NUMCELLSCSF, LYMPHSCSF, MONOCSF, GLUCCSF, VOLCSF in the last 72 hours. Respiratory Culture:  No results for input(s): Christal Wanda in the last 72 hours. AFB:No results for input(s): AFBSMEAR in the last 72 hours. Urine Culture  No results for input(s): LABURIN in the last 72 hours. RADIOLOGY:    CTA PULMONARY W CONTRAST   Final Result   No evidence pulmonary embolism to the segmental level. Small bilateral pleural effusions as well as multifocal bilateral airspace   disease, likely pneumonia. CT Head WO Contrast   Final Result   No acute intracranial abnormality. XR CHEST PORTABLE   Final Result   Ill-defined airspace opacity in the infrahilar region on the right. Findings   raise the question of pneumonia. XR CHEST PORTABLE    (Results Pending)       CONSULTS:    PHARMACY TO DOSE VANCOMYCIN    ASSESSMENT AND PLAN:      Active Problems:    Severe sepsis (HCC)    HAP (hospital-acquired pneumonia)    Septicemia ([address]    Cellulitis of left upper extremity    Hypokalemia    Leukemoid reaction  Resolved Problems:    * No resolved hospital problems. *      Pneumonia/ treat as HCAP with aspiration   Continue to treat gram-negative bacteria. Continue cefepime  Continue with vancomycin, check nasal MRSA and DC vancomycin if its negative. - blood c/s NGTD   -SLP to see  - switch meds IV        Acute metabolic encephalopathy- improving ( has some documented Wernicke)  Most likely secondary to infection  We'll continue to monitor    L hand swelling   - venous doppler     Sepsis:   with leukocytosis and tachycardia on admission.   Secondary to pneumonia  Continue to monitor     Acute hypoxic respiratory failure- wean o2 as tolerated   With tachypnea and satting 89 on

## 2019-04-24 NOTE — PLAN OF CARE
Problem: Falls - Risk of:  Goal: Will remain free from falls  Description  Will remain free from falls  Outcome: Ongoing  Note:   Falling star program remains in place. Call light and personal belongings within reach. Frequent visual monitoring continues. Toileting program inplace. Patient assisted in turning/repositioning at least once every 2 hours, and on a prn basis. Problem: Skin Integrity:  Goal: Skin integrity will stabilize  Description  Skin integrity will stabilize  Outcome: Ongoing  Note:   Monitoring patient skin integrity for skin breakdown, turning and repositioning q2h per protocol. Problem: Restraint Use - Nonviolent/Non-Self-Destructive Behavior:  Goal: Absence of restraint indications  Description  Absence of restraint indications  Outcome: Ongoing  Note:   Upper extremity soft wrist restraints intact. No S/S of restraint related injury . ROM performed Q2HR.

## 2019-04-24 NOTE — PROGRESS NOTES
edema  · Wound Type: Multiple(SI right hip (no issues noted), DTI to right heel )  · Current Nutrition Therapies:  · Oral Diet Orders: NPO   · Oral Diet intake: NPO  · Oral Nutrition Supplement (ONS) Orders: None  · ONS intake: NPO  · Tube Feeding Goal Provides: If TF started, recommend Jevity 1.5 to slowly advance as tolerated, monitoring for refeeding, to goal rate of 70 ml per hour. Rate adjusted for routine Nursing care (~20 hour run). Flush per MD. Regimen offers; 1400 ml / 2100 kcals / 90 gms pro / 1064 ml free water per day. · Anthropometric Measures:  · Ht: 5' 9\" (175.3 cm)   · Current Body Wt: 167 lb (75.8 kg)   · Admission Wt: 162 lb  · Weight Change: 8.7% loss with wt on 4/14/19 at 182 # 12.2 oz.  Loss likely a combination of fluid shifts & decreased po   · Ideal Body Wt: 160 lb (72.6 kg), % Ideal Body    · BMI Classification: BMI 18.5 - 24.9 Normal Weight    Nutrition Interventions:   Continue NPO(refer to MD for TF recs)  Continued Inpatient Monitoring    Nutrition Evaluation:   · Evaluation: Goals set   · Goals: tolerate most appropriate form of nutrition   · Monitoring: Nutrition Progression, Wound Healing, Mental Status/Confusion, Weight, Pertinent Labs, Diarrhea, Constipation      Electronically signed by Giovanny Hernandez RD, LD on 4/24/19 at 12:28 PM    Contact Number: [phone]

## 2019-04-24 NOTE — PLAN OF CARE
Nutrition Problem: Inadequate oral intake  Intervention: Food and/or Nutrient Delivery: Continue NPO(refer to MD for TF recs)  Nutritional Goals: tolerate most appropriate form of nutrition

## 2019-04-25 LAB
ALBUMIN SERPL-MCNC: 1.6 G/DL (ref 3.4–5)
ANION GAP SERPL CALCULATED.3IONS-SCNC: 10 MMOL/L (ref 3–16)
BASOPHILS ABSOLUTE: 0.1 K/UL (ref 0–0.2)
BASOPHILS RELATIVE PERCENT: 0.7 %
BUN BLDV-MCNC: 5 MG/DL (ref 7–20)
CALCIUM SERPL-MCNC: 7.6 MG/DL (ref 8.3–10.6)
CHLORIDE BLD-SCNC: 100 MMOL/L (ref 99–110)
CO2: 27 MMOL/L (ref 21–32)
CREAT SERPL-MCNC: <0.5 MG/DL (ref 0.8–1.3)
EOSINOPHILS ABSOLUTE: 0.4 K/UL (ref 0–0.6)
EOSINOPHILS RELATIVE PERCENT: 4.1 %
GFR AFRICAN AMERICAN: >60
GFR NON-AFRICAN AMERICAN: >60
GLUCOSE BLD-MCNC: 90 MG/DL (ref 70–99)
HCT VFR BLD CALC: 22.6 % (ref 40.5–52.5)
HEMOGLOBIN: 7.7 G/DL (ref 13.5–17.5)
LYMPHOCYTES ABSOLUTE: 1.3 K/UL (ref 1–5.1)
LYMPHOCYTES RELATIVE PERCENT: 11.9 %
MAGNESIUM: 1.6 MG/DL (ref 1.8–2.4)
MCH RBC QN AUTO: 34.7 PG (ref 26–34)
MCHC RBC AUTO-ENTMCNC: 34 G/DL (ref 31–36)
MCV RBC AUTO: 101.9 FL (ref 80–100)
MONOCYTES ABSOLUTE: 1.5 K/UL (ref 0–1.3)
MONOCYTES RELATIVE PERCENT: 13.9 %
NEUTROPHILS ABSOLUTE: 7.5 K/UL (ref 1.7–7.7)
NEUTROPHILS RELATIVE PERCENT: 69.4 %
PDW BLD-RTO: 13.8 % (ref 12.4–15.4)
PHOSPHORUS: 2.9 MG/DL (ref 2.5–4.9)
PLATELET # BLD: 276 K/UL (ref 135–450)
PMV BLD AUTO: 9.8 FL (ref 5–10.5)
POTASSIUM SERPL-SCNC: 3.5 MMOL/L (ref 3.5–5.1)
POTASSIUM SERPL-SCNC: 3.9 MMOL/L (ref 3.5–5.1)
POTASSIUM SERPL-SCNC: 4.1 MMOL/L (ref 3.5–5.1)
PROCALCITONIN: 0.27 NG/ML (ref 0–0.15)
RBC # BLD: 2.22 M/UL (ref 4.2–5.9)
SODIUM BLD-SCNC: 137 MMOL/L (ref 136–145)
WBC # BLD: 10.8 K/UL (ref 4–11)

## 2019-04-25 PROCEDURE — 85025 COMPLETE CBC W/AUTO DIFF WBC: CPT

## 2019-04-25 PROCEDURE — 97110 THERAPEUTIC EXERCISES: CPT

## 2019-04-25 PROCEDURE — 6360000002 HC RX W HCPCS: Performed by: NURSE PRACTITIONER

## 2019-04-25 PROCEDURE — 97167 OT EVAL HIGH COMPLEX 60 MIN: CPT

## 2019-04-25 PROCEDURE — 92526 ORAL FUNCTION THERAPY: CPT

## 2019-04-25 PROCEDURE — APPNB15 APP NON BILLABLE TIME 0-15 MINS: Performed by: NURSE PRACTITIONER

## 2019-04-25 PROCEDURE — 6360000002 HC RX W HCPCS: Performed by: INTERNAL MEDICINE

## 2019-04-25 PROCEDURE — 83735 ASSAY OF MAGNESIUM: CPT

## 2019-04-25 PROCEDURE — 84145 PROCALCITONIN (PCT): CPT

## 2019-04-25 PROCEDURE — 80069 RENAL FUNCTION PANEL: CPT

## 2019-04-25 PROCEDURE — 99232 SBSQ HOSP IP/OBS MODERATE 35: CPT | Performed by: INTERNAL MEDICINE

## 2019-04-25 PROCEDURE — 6370000000 HC RX 637 (ALT 250 FOR IP): Performed by: NURSE PRACTITIONER

## 2019-04-25 PROCEDURE — 6360000002 HC RX W HCPCS: Performed by: PHARMACIST

## 2019-04-25 PROCEDURE — 6370000000 HC RX 637 (ALT 250 FOR IP): Performed by: HOSPITALIST

## 2019-04-25 PROCEDURE — 97535 SELF CARE MNGMENT TRAINING: CPT

## 2019-04-25 PROCEDURE — 94762 N-INVAS EAR/PLS OXIMTRY CONT: CPT

## 2019-04-25 PROCEDURE — 1200000000 HC SEMI PRIVATE

## 2019-04-25 PROCEDURE — 84132 ASSAY OF SERUM POTASSIUM: CPT

## 2019-04-25 PROCEDURE — 2580000003 HC RX 258: Performed by: NURSE PRACTITIONER

## 2019-04-25 PROCEDURE — 2580000003 HC RX 258: Performed by: PHARMACIST

## 2019-04-25 PROCEDURE — 2580000003 HC RX 258: Performed by: INTERNAL MEDICINE

## 2019-04-25 PROCEDURE — 93971 EXTREMITY STUDY: CPT

## 2019-04-25 PROCEDURE — 97530 THERAPEUTIC ACTIVITIES: CPT

## 2019-04-25 PROCEDURE — 6370000000 HC RX 637 (ALT 250 FOR IP): Performed by: INTERNAL MEDICINE

## 2019-04-25 RX ORDER — MAGNESIUM SULFATE IN WATER 40 MG/ML
2 INJECTION, SOLUTION INTRAVENOUS ONCE
Status: COMPLETED | OUTPATIENT
Start: 2019-04-25 | End: 2019-04-25

## 2019-04-25 RX ADMIN — ENOXAPARIN SODIUM 40 MG: 40 INJECTION SUBCUTANEOUS at 10:00

## 2019-04-25 RX ADMIN — Medication 10 ML: at 20:31

## 2019-04-25 RX ADMIN — SODIUM CHLORIDE: 9 INJECTION, SOLUTION INTRAVENOUS at 09:55

## 2019-04-25 RX ADMIN — MAGNESIUM SULFATE HEPTAHYDRATE 1 G: 1 INJECTION, SOLUTION INTRAVENOUS at 07:59

## 2019-04-25 RX ADMIN — CEFEPIME 2 G: 2 INJECTION, POWDER, FOR SOLUTION INTRAVENOUS at 09:53

## 2019-04-25 RX ADMIN — MAGNESIUM SULFATE HEPTAHYDRATE 1 G: 1 INJECTION, SOLUTION INTRAVENOUS at 13:46

## 2019-04-25 RX ADMIN — PHENYTOIN SODIUM 100 MG: 50 INJECTION INTRAMUSCULAR; INTRAVENOUS at 17:41

## 2019-04-25 RX ADMIN — POTASSIUM CHLORIDE 20 MEQ: 29.8 INJECTION, SOLUTION INTRAVENOUS at 08:55

## 2019-04-25 RX ADMIN — QUETIAPINE FUMARATE 25 MG: 25 TABLET ORAL at 10:03

## 2019-04-25 RX ADMIN — Medication 1 CAPSULE: at 10:03

## 2019-04-25 RX ADMIN — THIAMINE HYDROCHLORIDE 100 MG: 100 INJECTION, SOLUTION INTRAMUSCULAR; INTRAVENOUS at 10:35

## 2019-04-25 RX ADMIN — PHENYTOIN SODIUM 100 MG: 50 INJECTION INTRAMUSCULAR; INTRAVENOUS at 06:15

## 2019-04-25 RX ADMIN — CEFEPIME 2 G: 2 INJECTION, POWDER, FOR SOLUTION INTRAVENOUS at 22:29

## 2019-04-25 RX ADMIN — CALCIUM GLUCONATE 1 G: 98 INJECTION, SOLUTION INTRAVENOUS at 09:59

## 2019-04-25 RX ADMIN — THERA TABS 1 TABLET: TAB at 10:03

## 2019-04-25 RX ADMIN — POTASSIUM CHLORIDE 20 MEQ: 29.8 INJECTION, SOLUTION INTRAVENOUS at 07:59

## 2019-04-25 RX ADMIN — SODIUM CHLORIDE: 9 INJECTION, SOLUTION INTRAVENOUS at 09:58

## 2019-04-25 RX ADMIN — QUETIAPINE FUMARATE 25 MG: 25 TABLET ORAL at 21:10

## 2019-04-25 RX ADMIN — MAGNESIUM SULFATE HEPTAHYDRATE 2 G: 40 INJECTION, SOLUTION INTRAVENOUS at 11:30

## 2019-04-25 RX ADMIN — FOLIC ACID 1 MG: 1 TABLET ORAL at 10:03

## 2019-04-25 RX ADMIN — PHENYTOIN SODIUM 100 MG: 50 INJECTION INTRAMUSCULAR; INTRAVENOUS at 00:20

## 2019-04-25 ASSESSMENT — PAIN SCALES - GENERAL
PAINLEVEL_OUTOF10: 0

## 2019-04-25 NOTE — PROGRESS NOTES
1930: Handoff report received from [place], [address] [place] [address] Rd: Shift assessment completed. VSS. Pt is oriented to place, situation and person but disoriented to time. Pt is more calm and cooperative at this time, follows commands. Pt has cupious secretions and suctioned with yanker. Will continue to monitor. 2130: K repeat from the day time replacement was 3.3, Hospitalist perfectserved and updated. See new orders. 2155: K replacement started. 0003: K replacement completed. 0110: lab for K drawn and sent. Awaiting for the results. 0400: VSS, repositioned and pt is resting well at this time. 0540: morning lab drawn and sent. 0600: pt repositioned and bedpad changed. 0730: Handoff report with Mc Ortez RN.    Electronically signed by Melissa Malin RN on 4/25/2019 at 7:36 AM

## 2019-04-25 NOTE — PLAN OF CARE
Problem: Falls - Risk of:  Goal: Will remain free from falls  Description  Will remain free from falls  4/25/2019 1330 by Saud Foster RN  Outcome: Ongoing  Falling star program remains in place. Call light and personal belongings within reach. Frequent visual monitoring continues. Toileting program inplace. Patient assisted in turning/repositioning at least once every 2 hours, and on a prn basis. 4/25/2019 0313 by Namrata Hewitt RN  Outcome: Ongoing  Note:   Fall risk precautions in place. Call light within reach. Frequent visual monitoring in place q1h. Bed/chair alarm activated as applicable. Problem: Pain:  Goal: Patient's pain/discomfort is manageable  Description  Patient's pain/discomfort is manageable  Outcome: Ongoing  Continuing to monitor pain and discomfort. Monitoring pain level on scale of 0-10. Non- pharmacological measures encouraged to reduce discomfort/pain. PRN pain meds administeration continues when/if applicable as ordered by physician. Problem: Skin Integrity:  Goal: Skin integrity will stabilize  Description  Skin integrity will stabilize  4/25/2019 1330 by Saud Foster RN  Outcome: Ongoing  Monitoring patient skin integrity for skin breakdown, turning and repositioning q2h with pillow support per protocol. Patient assisted with turning and repositioning self. 4/25/2019 0313 by Namrata Hewitt RN  Outcome: Ongoing    Problem: Restraint Use - Nonviolent/Non-Self-Destructive Behavior:  Goal: Absence of restraint indications  Description  Absence of restraint indications  4/25/2019 1330 by Saud Foster RN  Outcome: Ongoing  Patient continues with soft wrist restraint, maintained per unit protocol. No s/s of any injury noted. Will continue to monitor. 4/25/2019 0313 by Namrata Hewitt RN  Outcome: Ongoing  Note:   Upper extremity soft wrist restraints intact. No S/S of restraint related injury . ROM performed Q2HR.        Note:

## 2019-04-25 NOTE — PROGRESS NOTES
ETOH.    Exam: ADLs, transfers, bed mob  Assistance / Modification:  Max -dependent mobility, max A -total for ADLs  Patient Education: use of yankur for secretions, need for sling and why he has not been receiving his oral meds. Barriers to Learning: impaired cogniton (variable-did well today)  Activity Tolerance  Activity Tolerance: Treatment limited secondary to medical complications (free text)(limited by general weakness fro PNA and weight bearing restriction 2 extremeties.)  Safety Devices  Safety Devices in place: Yes  Type of devices: Left in bed;Call light within reach; Bed alarm in place(reported to RN pt's questions re: oral meds and using the yankur and washing face successfully, discussed swallow assessment w/ RN)           Patient Diagnosis(es): The primary encounter diagnosis was Septicemia ([facility] [address] Diagnoses of Leukemoid reaction, Hypokalemia, Hypomagnesemia, Cellulitis of left upper extremity, and HAP (hospital-acquired pneumonia) were also pertinent to this visit. has a past medical history of BPH (benign prostatic hyperplasia) and Seizures ([facility] [address] has a past surgical history that includes Femur fracture surgery (Right, 4/16/2019) and Skull fracture elevation. Restrictions  Restrictions/Precautions  Restrictions/Precautions: Fall Risk  Position Activity Restriction  Other position/activity restrictions: R UE NWB immob with Sling (no ROM Exs), R LE 50% Wgt Bear. NPO at this time. H/O ETOH. Subjective   General  Chart Reviewed: Yes  Patient assessed for rehabilitation services?: Yes  Additional Pertinent Hx: HPI initial admission Per Mumtaz Ivey MD 4/14/19: Pt is \"77 y.o. male who presented to [facility] after he had a mechanical fall, admitted to have right hip pain, 10 out of 10 intensity, nonradiating, sharp, worse with any movement, to note that patient is poor historian and he was intoxicated on presentation.   Found to have right femoral and right humeral fracture along was 2 but did not mention arm.)        ADL  Feeding: NPO(not handling secretions , needs yanker after he coughs, Per SP assessment  he could manage purees and honey liquid  by teaspoon- pt apparently needed Max cues to swallow during assessment. - diet order remains NPO - OT did not have pt brush teeth )  Grooming: Minimal assistance; Moderate assistance(can use yankur to clear secretions and wash his face -wrist in restraints but he did not attempt to pull at any lines .)  UE Bathing: Dependent/Total  LE Bathing: Dependent/Total  UE Dressing: Dependent/Total  LE Dressing: Dependent/Total  Toileting: Dependent/Total(incontinent of BM)  Tone RUE  RUE Tone: Normotonic  Tone LUE  LUE Tone: Normotonic  Coordination  Movements Are Fluid And Coordinated: No  Quality of Movement Other  Comment: RUE in sling - NWBing d/t humerus fx assited w. ROM of hand and fit of sling and positioning of arm forward in slight shld flexion to prevent humerous falliung back into shld extension     Bed mobility  Rolling to Left: Dependent/Total  Rolling to Right: Dependent/Total  Scooting: Dependent/Total  Comment: Pt c/o pain whn being rolled - discussed w/ RN . Staff try to roll pt with pad but if pad is soiled they have no option. Pt aware once he is stronger and able to participate more he will not be dependnet for bed mobility. Transfers  Transfer Comments: at last D/C pt achieved transfer w/ Max x2 for sit <> stand from EOB to silvia stedy and sit to recliner chair , pt needs passive lift at this time . have not attempted EOB or transfer yet       Cognition  Overall Cognitive Status: Exceptions  Arousal/Alertness: Delayed responses to stimuli  Following Commands: Follows one step commands with increased time; Follows one step commands with repetition(used yanker to handle secretions and washed his face  -pt did both with his wrist restraint on )  Attention Span: Attends with cues to redirect  Memory: Decreased recall of precautions;Decreased recall of recent events(pt asking about his oral meds - pt accepted explanation from RN- unable to swallow to take oral meds)  Problem Solving: Assistance required to identify errors made;Assistance required to generate solutions;Assistance required to implement solutions  Initiation: Requires cues for all  Cognition Comment: Eval limited to bed tasks -unable to completely asses all areas. Pt appeard to be appropriate and followed conversation, recalled he wanted to ask about his meds, listed 4 meds (Not sure if these were accurate but sounded plausible) . ,moved self as able to allow OT to secure his sling better.  Per RN pt's cog status waxes and wanes -possible [place]        Sensation  Overall Sensation Status: WFL(Pt denies numb/tingling. )        LUE AROM : WFL    Left Hand AROM: WFL    RUE General AROM: In sling - did not assess    Right Hand AROM: WFL  LUE Strength  Gross LUE Strength: WFL  RUE Strength  RUE Strength Comment: Did not assess       Plan   Plan  Times per week: 3-5  Times per day: Daily  Current Treatment Recommendations: Strengthening, Functional Mobility Training, Endurance Training, Balance Training, Safety Education & Training, Equipment Evaluation, Education, & procurement, Patient/Caregiver Education & Training, Self-Care / ADL, Positioning, ROM    G-Code     OutComes Score  How much help for putting on and taking off regular lower body clothing?: Total  How much help for Bathing?: Total  How much help for Toileting?: Total  How much help for putting on and taking off regular upper body clothing?: Total  How much help for taking care of personal grooming?: A Lot  How much help for eating meals?: Total(NPO )  AM-PAC Inpatient Daily Activity Raw Score: 7  AM-PAC Inpatient ADL T-Scale Score : 20.13  ADL Inpatient CMS 0-100% Score: 92.44  ADL Inpatient CMS G-Code Modifier : CM    AM-PAC Score        AM-PAC Inpatient Daily Activity Raw Score: 7  AM-PAC Inpatient ADL T-Scale Score : 20.13  ADL Inpatient CMS 0-100% Score: 92.44  ADL Inpatient CMS G-Code Modifier : CM    Goals  Short term goals  Time Frame for Short term goals: prior to d/c:   Short term goal 1: Pt will complete sit <> stand transfer with mod A  Short term goal 2: Pt will feed self w/ Supervision after set up when swallow improves  Short term goal 3: Pt will complete dressing /bathing with modA  Short term goal 4: Pt will complete toileting with max A  Short term goal 5: Pt will complete grooming with setup   Patient Goals   Patient goals : Pt did not formulate goal at this time ,is aware need for ongoing therapy       Therapy Time   Individual Concurrent Group Co-treatment   Time In [facility] [address]         Time Out 1750         Minutes 65               If patient is discharged prior to next OT visit , refer to last OT progress note for Discharge Status.     Markell Diaz, OT  LIC # [id number]

## 2019-04-25 NOTE — PROGRESS NOTES
consumed puree applesauce x5 with decreased AP movement, moderately reduced lingual control, tongue pumping, suspected premature loss, moderate swallow delay; needs verbal cues for strong swallow and for repeat swallow with all trials, due to strong suspicion of base of tongue and pharyngeal residue; however, NO overt s/s of aspiration with puree. · Honey liquids via cup (small controlled sips fed by SLP) x6 with moderately reduced lingual control, decreased AP movement, variable oral hold up to 8-10 seconds, moderate swallow delay, verbal cues for repeat swallow. NO Overt s/s of aspiration   · Nectar liquids via cup (small controlled sips fed by SLP) x3 consistent with s/s for honey liquids above; BUT progressive throat clear followed by strong and prolonged delayed cough and increased congestion after 3rd trial.   · O2 saturations remained above 90 throughout. Pt orally suctioned and po trials discontinued. Pt calm and comfortable prior to SLP exit. Goals:   Dysphagia Goals: The patient will tolerate recommended diet without observed clinical signs of aspiration, ongoing, remains NPO, nsg reporting pt unable to take meds whole w pudding this AM  The patient/caregiver will demonstrate understanding of compensatory strategies for improved swallowing safety. , ongoing, pt needs max cues, impulsive, poor safety awareness  The patient will improve oral motor function via therapeutic oral motor exercises to 5/5 each trial., ongoing  The patient will tolerate honey-thick liquids without signs and symptoms of aspiration 10/10 via cup. Ongoing; tolerated 5/5 controlled sips via cup, no immediate or delayed s/s     Assessment:   Impressions:   Dysphagia Diagnosis: Moderate oral stage dysphagia, Moderate to severe pharyngeal stage dysphagia     Improved toleration of honey thick liquids this session compared to 4/24 BSE.   Pt tolerated puree textures and controlled sips of honey thick liquid fully upright in bed with no immediate or delayed s/s of aspiration. Pt exhibits overt immediate and delayed s/s of aspiration with controlled sips of nectar liquid via cup     Diet Recommendations: Only feed when awake/alert and accepting PO    puree    honey via teaspoon      Recommended Form of Meds: Crushed in puree as able    Strategies:   Compensatory Swallowing Strategies: Upright as possible for all oral intake, Remain upright for 30-45 minutes after meals, Total feed, Eat/Feed slowly, Swallow 2 times per bite/sip, Small bites/sips    Education:     Patient Education: results and recs  Patient Education Response: Verbalizes understanding, Needs reinforcement    Prognosis:   guarded    Plan:     Continue Dysphagia Therapy:   Interventions: Therapeutic Interventions: Therapeutic PO trials with SLP, Diet tolerance monitoring, Laryngeal exercises, Pharyngeal exercises, Oral motor exercises  Duration/Frequency of therapy while on unit: Duration/Frequency of Treatment  Duration/Frequency of Treatment: 3-5x/wk while on acute unit  Discharge Instructions:   Anticipate Yes for further skilled Speech Therapy for Dysphagia at discharge    This note serves as a D/C Summary in the event that this patient is discharged prior to the next therapy session.     Coded treatment time:0  Total treatment time: 28    Electronically signed by Todd Munson MS, CCC-SLP #[id number] on 4/25/2019 at 3:43 PM

## 2019-04-25 NOTE — PROGRESS NOTES
Progress Note  Admit Date: 2019      PCP: Nate Cunningham MD     CC: F/U for pneumonia    SUBJECTIVE / Interval History: Remains confused this am      Allergies  Patient has no known allergies. Medications    Scheduled Meds:   sodium chloride flush  10 mL Intravenous 2 times per day    thiamine (VITAMIN B1) IVPB  100 mg Intravenous Q24H    phenytoin (DILANTIN) maintenance dose IVPB  100 mg Intravenous Q6H    sodium chloride flush  10 mL Intravenous 2 times per day    enoxaparin  40 mg Subcutaneous Daily    multivitamin  1 tablet Oral Daily    folic acid  1 mg Oral Daily    cefepime  2 g Intravenous Q12H    QUEtiapine  25 mg Oral BID     Continuous Infusions:   sodium chloride 5 mL/hr at 19 0958    sodium chloride 5 mL/hr at 19 0955       PRN Meds:  sodium chloride flush, potassium chloride, sodium chloride flush, magnesium sulfate, haloperidol lactate    Vitals    TEMPERATURE:  Current - Temp: 98.5 °F (36.9 °C); Max - Temp  Av °F (37.2 °C)  Min: 98.5 °F (36.9 °C)  Max: 99.7 °F (37.6 °C)  RESPIRATIONS RANGE: Resp  Av.2  Min: 20  Max: 27  PULSE RANGE: Pulse  Av.6  Min: 100  Max: 107  BLOOD PRESSURE RANGE:  Systolic (18YQB), MDW:630 , Min:86 , PPA:130   ; Diastolic (63PWW), ADV:17, Min:43, Max:68    PULSE OXIMETRY RANGE: SpO2  Av.5 %  Min: 88 %  Max: 97 %  24HR INTAKE/OUTPUT:      Intake/Output Summary (Last 24 hours) at 2019 1700  Last data filed at 2019 1105  Gross per 24 hour   Intake 1265 ml   Output 950 ml   Net 315 ml       Exam:    Gen: No distress. Eyes: PERRL. No sclera icterus. No conjunctival injection. ENT: No discharge. Pharynx clear. External appearance of ears and nose normal.  Neck: Trachea midline. No obvious mass. Resp: No accessory muscle use. No crackles. No wheezes. + rhonchi. No dullness on percussion. CV: Regular rate. Regular rhythm. No murmur or rub. No edema. GI: Non-tender. Non-distended. No hernia.    Skin: L arm rash Any change in status will be called. Fungal Culture:   No results for input(s): FUNGSM in the last 72 hours. No results for input(s): FUNCXBLD in the last 72 hours. CSF Culture:  No results for input(s): COLORCSF, APPEARCSF, CFTUBE, CLOTCSF, WBCCSF, RBCCSF, NEUTCSF, NUMCELLSCSF, LYMPHSCSF, MONOCSF, GLUCCSF, VOLCSF in the last 72 hours. Respiratory Culture:  No results for input(s): Neeru Gouty in the last 72 hours. AFB:No results for input(s): AFBSMEAR in the last 72 hours. Urine Culture  No results for input(s): LABURIN in the last 72 hours. RADIOLOGY:    VL Extremity Venous Left         XR CHEST PORTABLE   Final Result   Findings concerning for worsening edema or infection on the right. CTA PULMONARY W CONTRAST   Final Result   No evidence pulmonary embolism to the segmental level. Small bilateral pleural effusions as well as multifocal bilateral airspace   disease, likely pneumonia. CT Head WO Contrast   Final Result   No acute intracranial abnormality. XR CHEST PORTABLE   Final Result   Ill-defined airspace opacity in the infrahilar region on the right. Findings   raise the question of pneumonia. CONSULTS:    None    ASSESSMENT AND PLAN:      Active Problems:    Severe sepsis (HCC)    HAP (hospital-acquired pneumonia)    Septicemia (HCC)    Cellulitis of left upper extremity    Hypokalemia    Leukemoid reaction    Moderate malnutrition (HCC)  Resolved Problems:    * No resolved hospital problems. *      Pneumonia/ treat as HCAP with aspiration   Continue to treat gram-negative bacteria. Continue Cefepime  Off Vancomycin, Nasal MRSA negative.   - blood c/s NGTD      Acute metabolic encephalopathy- improving ( has some documented Wernicke)  Most likely secondary to infection  We'll continue to monitor and provide supportive care    L hand swelling   - venous doppler   Left   Isolated acute totally occluded superficial venous thrombosis involving a

## 2019-04-25 NOTE — PROGRESS NOTES
4 Eyes Skin Assessment     The patient is being assess for  Shift Handoff    I agree that 2 RN's have performed a thorough Head to Toe Skin Assessment on the patient. ALL assessment sites listed below have been assessed. Areas assessed by both nurses:   [x]   Head, Face, and Ears   [x]   Shoulders, Back, and Chest  [x]   Arms, Elbows, and Hands   [x]   Coccyx, Sacrum, and IschIum  [x]   Legs, Feet, and Heels        Does the Patient have Skin Breakdown?   Yes LDA WOUND CARE was Initiated documentation include the Trinity-wound, Wound Assessment, Measurements, Dressing Treatment, Drainage, and Color\",         Mahesh Prevention initiated:  Yes   Wound Care Orders initiated:  Yes      [id number]  nurse consulted for Pressure Injury (Stage 3,4, Unstageable, DTI, NWPT, and Complex wounds), New and Established Ostomies:  Yes      Nurse 1 eSignature: Electronically signed by Dillon Sterling RN on 4/25/19 at 07:34 AM    **SHARE this note so that the co-signing nurse is able to place an eSignature**    Nurse 2 eSignature: Electronically signed by Kaveh Ye RN on 4/25/19 at 7:45 PM

## 2019-04-25 NOTE — PROGRESS NOTES
1020 Patient resting quietly in bed with periodic yelling/calling out. Remains cooperative with staff to present. [place] patent to gravity bedside drainage. IVfs infusing per pump without difficulty. Soft wrist restraint maintained per unit protocol. Patient with moist congested cough noted. RN attempted PO intake of AM medications, patient with difficulty swallowing and inability to take second Culturelle r/t risk of aspiration. Assessment completed and documented. 1600 Patient remains confused at times but cooperative with staff. IVFs infusing per pump without difficulty. [place] patent to gravity bedside drainage. 1830 Patient resting quietly in bed at present time. Readily responds to verbal stimuli, denies any complaints at present time.   Electronically signed by Jo Ann Wilson RN on 4/25/2019 at 7:51 PM

## 2019-04-25 NOTE — PROGRESS NOTES
141  --   --  137   K 3.2* 2.9* 3.3* 3.9 3.5    101  --   --  100   CO2 30 26  --   --  27   PHOS 2.8 2.1*  --   --  2.9   BUN 4* 5*  --   --  5*   CREATININE <0.5* <0.5*  --   --  <0.5*     LIVER PROFILE:   Recent Labs     04/22/19  2110   AST 20   ALT 17   BILIDIR <0.2   BILITOT 0.5   ALKPHOS 83     PT/INR: No results for input(s): PROTIME, INR in the last 72 hours. APTT: No results for input(s): APTT in the last 72 hours. UA:  Recent Labs     04/22/19  2214   COLORU DK YELLOW   PHUR 7.0  7.0   WBCUA 3-5   RBCUA 0-2   BACTERIA Rare*   CLARITYU Clear   SPECGRAV 1.018   LEUKOCYTESUR Negative   UROBILINOGEN 1.0   BILIRUBINUR SMALL*   BLOODU Negative   GLUCOSEU Negative     No results for input(s): PH, PCO2, PO2 in the last 72 hours. Cx:      Films:  Radiology Review:  Pertinent images / reports were reviewed as a part of this visit. CT Chest w/ contrast: No results found for this or any previous visit. CT Chest w/o contrast: No results found for this or any previous visit. CTPA: No results found for this or any previous visit. CXR PA/LAT: No results found for this or any previous visit. CXR portable:   Results for orders placed during the hospital encounter of 04/22/19   XR CHEST PORTABLE    Narrative EXAMINATION:  SINGLE XRAY VIEW OF THE CHEST    4/22/2019 8:35 pm    COMPARISON:  April 15    HISTORY:  ORDERING SYSTEM PROVIDED HISTORY: SOB  TECHNOLOGIST PROVIDED HISTORY:  Reason for exam:->SOB  Ordering Physician Provided Reason for Exam: sob  Acuity: Acute  Type of Exam: Initial    FINDINGS:  Heart size at the upper limits of normal.  Mediastinal contours are stable. Ill-defined infrahilar airspace opacity on the right is noted extending to  the diaphragm. There is no consolidation otherwise. Multiple rib fracture  deformities are again noted. There is no pneumothorax      Impression Ill-defined airspace opacity in the infrahilar region on the right.   Findings  raise the question of pneumonia. Assessment:     Healthcare associated pneumonia, possible MRSA and gram negative pneumonia   Hx EtOH abuse  altered mental status  Cellulitis  Acute hypoxemia, saturation < 90% on room air   Bilateral pleural effusion   Anemia  Rib fracture, left status post fall. Plan:      hcap  -  Cefepime x 5 days. Monitor left arm.   -  Lactobacillus  - sputum and blood culture ordere         Pleural effusion  - very small. Monitor. No intervension        AMS  - continues to be confusion. .    - wernicke encephalopathy at baseline.   - This may be baseline      Anemia  - Macrocytic.  secondary to etoh?  - monitor with MVI  - may need out patient work up.           Nutrition  - poor swallow. - hold on NG tube with confusion. If swallowing does not improve, may need to talk to family about comfort feeding. With baseline confusion, concern about PEG        Left arm  - rule out DVT in arm.             Jud Mckeon [facility] [phone]

## 2019-04-25 NOTE — PROGRESS NOTES
4 Eyes Skin Assessment     The patient is being assess for  Shift Handoff    I agree that 2 RN's have performed a thorough Head to Toe Skin Assessment on the patient. ALL assessment sites listed below have been assessed. Areas assessed by both nurses: GT/Raquel  [x]   Head, Face, and Ears   [x]   Shoulders, Back, and Chest  [x]   Arms, Elbows, and Hands   [x]   Coccyx, Sacrum, and IschIum  [x]   Legs, Feet, and Heels        Does the Patient have Skin Breakdown?   Yes LDA WOUND CARE was Initiated documentation include the Trinity-wound, Wound Assessment, Measurements, Dressing Treatment, Drainage, and Color\",         Mahesh Prevention initiated:  Yes   Wound Care Orders initiated:  Yes      [id number]  nurse consulted for Pressure Injury (Stage 3,4, Unstageable, DTI, NWPT, and Complex wounds), New and Established Ostomies:  Yes    Nurse 1 eSignature: Electronically signed by Greg Hicks RN on 4/25/19 at 8:01 PM    **SHARE this note so that the co-signing nurse is able to place an eSignature**    Nurse 2 eSignature: Electronically signed by Melissa Menchaca RN on 4/26/19 at 19:40PM

## 2019-04-26 LAB
ALBUMIN SERPL-MCNC: 1.9 G/DL (ref 3.4–5)
ANION GAP SERPL CALCULATED.3IONS-SCNC: 9 MMOL/L (ref 3–16)
ANISOCYTOSIS: ABNORMAL
BANDED NEUTROPHILS RELATIVE PERCENT: 8 % (ref 0–7)
BASOPHILS ABSOLUTE: 0.2 K/UL (ref 0–0.2)
BASOPHILS RELATIVE PERCENT: 2 %
BUN BLDV-MCNC: 5 MG/DL (ref 7–20)
CALCIUM SERPL-MCNC: 7.1 MG/DL (ref 8.3–10.6)
CHLORIDE BLD-SCNC: 103 MMOL/L (ref 99–110)
CO2: 27 MMOL/L (ref 21–32)
CREAT SERPL-MCNC: <0.5 MG/DL (ref 0.8–1.3)
EOSINOPHILS ABSOLUTE: 0.6 K/UL (ref 0–0.6)
EOSINOPHILS RELATIVE PERCENT: 5 %
GFR AFRICAN AMERICAN: >60
GFR NON-AFRICAN AMERICAN: >60
GLUCOSE BLD-MCNC: 83 MG/DL (ref 70–99)
HCT VFR BLD CALC: 23.1 % (ref 40.5–52.5)
HEMOGLOBIN: 7.7 G/DL (ref 13.5–17.5)
LYMPHOCYTES ABSOLUTE: 1.4 K/UL (ref 1–5.1)
LYMPHOCYTES RELATIVE PERCENT: 12 %
MAGNESIUM: 1.8 MG/DL (ref 1.8–2.4)
MCH RBC QN AUTO: 34.1 PG (ref 26–34)
MCHC RBC AUTO-ENTMCNC: 33.2 G/DL (ref 31–36)
MCV RBC AUTO: 102.7 FL (ref 80–100)
METAMYELOCYTES RELATIVE PERCENT: 2 %
MONOCYTES ABSOLUTE: 0.7 K/UL (ref 0–1.3)
MONOCYTES RELATIVE PERCENT: 6 %
MYELOCYTE PERCENT: 1 %
NEUTROPHILS ABSOLUTE: 8.6 K/UL (ref 1.7–7.7)
NEUTROPHILS RELATIVE PERCENT: 64 %
PDW BLD-RTO: 14 % (ref 12.4–15.4)
PHOSPHORUS: 3.1 MG/DL (ref 2.5–4.9)
PLATELET # BLD: 273 K/UL (ref 135–450)
PLATELET SLIDE REVIEW: ADEQUATE
PMV BLD AUTO: 10 FL (ref 5–10.5)
POLYCHROMASIA: ABNORMAL
POTASSIUM SERPL-SCNC: 3.5 MMOL/L (ref 3.5–5.1)
PROCALCITONIN: 0.33 NG/ML (ref 0–0.15)
RBC # BLD: 2.25 M/UL (ref 4.2–5.9)
SLIDE REVIEW: ABNORMAL
SODIUM BLD-SCNC: 139 MMOL/L (ref 136–145)
WBC # BLD: 11.5 K/UL (ref 4–11)

## 2019-04-26 PROCEDURE — 6370000000 HC RX 637 (ALT 250 FOR IP): Performed by: INTERNAL MEDICINE

## 2019-04-26 PROCEDURE — 6360000002 HC RX W HCPCS: Performed by: INTERNAL MEDICINE

## 2019-04-26 PROCEDURE — 97127 HC SP THER IVNTJ W/FOCUS COG FUNCJ: CPT

## 2019-04-26 PROCEDURE — 97530 THERAPEUTIC ACTIVITIES: CPT

## 2019-04-26 PROCEDURE — 2580000003 HC RX 258: Performed by: NURSE PRACTITIONER

## 2019-04-26 PROCEDURE — 97110 THERAPEUTIC EXERCISES: CPT

## 2019-04-26 PROCEDURE — 94762 N-INVAS EAR/PLS OXIMTRY CONT: CPT

## 2019-04-26 PROCEDURE — 83735 ASSAY OF MAGNESIUM: CPT

## 2019-04-26 PROCEDURE — 2580000003 HC RX 258: Performed by: INTERNAL MEDICINE

## 2019-04-26 PROCEDURE — 2500000003 HC RX 250 WO HCPCS: Performed by: INTERNAL MEDICINE

## 2019-04-26 PROCEDURE — 2580000003 HC RX 258: Performed by: PHARMACIST

## 2019-04-26 PROCEDURE — 1200000000 HC SEMI PRIVATE

## 2019-04-26 PROCEDURE — 6360000002 HC RX W HCPCS: Performed by: NURSE PRACTITIONER

## 2019-04-26 PROCEDURE — 92526 ORAL FUNCTION THERAPY: CPT

## 2019-04-26 PROCEDURE — 99232 SBSQ HOSP IP/OBS MODERATE 35: CPT | Performed by: INTERNAL MEDICINE

## 2019-04-26 PROCEDURE — 6370000000 HC RX 637 (ALT 250 FOR IP): Performed by: HOSPITALIST

## 2019-04-26 PROCEDURE — 85025 COMPLETE CBC W/AUTO DIFF WBC: CPT

## 2019-04-26 PROCEDURE — 84145 PROCALCITONIN (PCT): CPT

## 2019-04-26 PROCEDURE — 6370000000 HC RX 637 (ALT 250 FOR IP): Performed by: NURSE PRACTITIONER

## 2019-04-26 PROCEDURE — APPNB15 APP NON BILLABLE TIME 0-15 MINS: Performed by: NURSE PRACTITIONER

## 2019-04-26 PROCEDURE — 80069 RENAL FUNCTION PANEL: CPT

## 2019-04-26 PROCEDURE — 6360000002 HC RX W HCPCS: Performed by: PHARMACIST

## 2019-04-26 RX ORDER — NICOTINE 21 MG/24HR
1 PATCH, TRANSDERMAL 24 HOURS TRANSDERMAL EVERY 24 HOURS
Status: DISCONTINUED | OUTPATIENT
Start: 2019-04-26 | End: 2019-05-07 | Stop reason: HOSPADM

## 2019-04-26 RX ORDER — MAGNESIUM SULFATE IN WATER 40 MG/ML
2 INJECTION, SOLUTION INTRAVENOUS ONCE
Status: COMPLETED | OUTPATIENT
Start: 2019-04-26 | End: 2019-04-26

## 2019-04-26 RX ORDER — PHENYTOIN SODIUM 50 MG/ML
100 INJECTION, SOLUTION INTRAMUSCULAR; INTRAVENOUS EVERY 6 HOURS
Status: DISCONTINUED | OUTPATIENT
Start: 2019-04-26 | End: 2019-04-27

## 2019-04-26 RX ADMIN — PHENYTOIN SODIUM 100 MG: 50 INJECTION INTRAMUSCULAR; INTRAVENOUS at 06:02

## 2019-04-26 RX ADMIN — FOLIC ACID 1 MG: 1 TABLET ORAL at 09:01

## 2019-04-26 RX ADMIN — Medication 10 ML: at 09:01

## 2019-04-26 RX ADMIN — METRONIDAZOLE 500 MG: 500 INJECTION, SOLUTION INTRAVENOUS at 16:32

## 2019-04-26 RX ADMIN — ENOXAPARIN SODIUM 40 MG: 40 INJECTION SUBCUTANEOUS at 08:59

## 2019-04-26 RX ADMIN — THIAMINE HYDROCHLORIDE 100 MG: 100 INJECTION, SOLUTION INTRAMUSCULAR; INTRAVENOUS at 11:53

## 2019-04-26 RX ADMIN — QUETIAPINE FUMARATE 25 MG: 25 TABLET ORAL at 21:07

## 2019-04-26 RX ADMIN — METRONIDAZOLE 500 MG: 500 INJECTION, SOLUTION INTRAVENOUS at 08:59

## 2019-04-26 RX ADMIN — CEFEPIME 2 G: 2 INJECTION, POWDER, FOR SOLUTION INTRAVENOUS at 10:29

## 2019-04-26 RX ADMIN — PHENYTOIN SODIUM 100 MG: 50 INJECTION INTRAMUSCULAR; INTRAVENOUS at 13:21

## 2019-04-26 RX ADMIN — CALCIUM GLUCONATE 2 G: 98 INJECTION, SOLUTION INTRAVENOUS at 10:28

## 2019-04-26 RX ADMIN — PHENYTOIN SODIUM 100 MG: 50 INJECTION INTRAMUSCULAR; INTRAVENOUS at 00:40

## 2019-04-26 RX ADMIN — THERA TABS 1 TABLET: TAB at 09:00

## 2019-04-26 RX ADMIN — QUETIAPINE FUMARATE 25 MG: 25 TABLET ORAL at 09:00

## 2019-04-26 RX ADMIN — CEFEPIME 2 G: 2 INJECTION, POWDER, FOR SOLUTION INTRAVENOUS at 21:07

## 2019-04-26 RX ADMIN — METRONIDAZOLE 500 MG: 500 INJECTION, SOLUTION INTRAVENOUS at 23:25

## 2019-04-26 RX ADMIN — MAGNESIUM SULFATE HEPTAHYDRATE 2 G: 40 INJECTION, SOLUTION INTRAVENOUS at 10:27

## 2019-04-26 RX ADMIN — PHENYTOIN SODIUM 100 MG: 50 INJECTION INTRAMUSCULAR; INTRAVENOUS at 17:26

## 2019-04-26 ASSESSMENT — PAIN SCALES - GENERAL
PAINLEVEL_OUTOF10: 0

## 2019-04-26 NOTE — PROGRESS NOTES
Physical Therapy  Facility/Department: [address] ICU  Daily Treatment Note  NAME: Evelia Cochran  : 1955  MRN: [id number]    Date of Service: 2019    Discharge Recommendations:  Continue to assess pending progress   PT Equipment Recommendations  Other: Defer to next level of care. Evelia Cochran scored a 7/24 on the AM-PAC short mobility form. Current research shows that an AM-PAC score of 17 or less is typically not associated with a discharge to the patient's home setting. Based on the patients AM-PAC score and their current functional mobility deficits, it is recommended that the patient have 3-5 sessions per week of Physical Therapy at d/c to increase the patients independence. Patient Diagnosis(es): The primary encounter diagnosis was Septicemia ([facility] [address] Diagnoses of Leukemoid reaction, Hypokalemia, Hypomagnesemia, Cellulitis of left upper extremity, and HAP (hospital-acquired pneumonia) were also pertinent to this visit. has a past medical history of BPH (benign prostatic hyperplasia) and Seizures ([facility] [address] has a past surgical history that includes Femur fracture surgery (Right, 2019) and Skull fracture elevation. Restrictions  Restrictions/Precautions  Restrictions/Precautions: Fall Risk  Position Activity Restriction  Other position/activity restrictions: R UE NWB immob with Sling (no ROM Exs), R LE 50% Wgt Bear. H/O ETOH. Diet : Dysphagia I, Pureed, Pudding Thick. Subjective   General  Chart Reviewed: Yes  Additional Pertinent Hx: 69 y/o male return to ER 19, few hrs after d/c to SNF setting, with Septicemia, Cellulitis L UE.  L UE Dopplers pending. Recent Hospital Admit -19 with R Humerus Fx (Medically Manage) and R Femur Fx (ORIF/IM Nail 19, Dr Marine Denny), 2 Rib Fxs (Displaced L 7th and 9th) following Fall at Home (ETOH). PMH as noted also including H/O Skull Fx/Elevation, ETOH.     Response To Previous Treatment: Patient with no complaints from previous session. Family / Caregiver Present: No  Referring Practitioner: Dr. Boston Scott. Subjective  Subjective: Pt agreeable to PT Rx. Apparent discomfort R UE/LE - no pain scale rating given by pt. Orientation  Orientation  Overall Orientation Status: Impaired  Orientation Level: Oriented to person(Pt somewhat disoriented to recent events/situation.  )  Cognition      Objective   Bed mobility  Rolling to Left: Dependent/Total  Rolling to Right: Dependent/Total  Scooting: Dependent/Total  Comment: Bed placed in semi-chair position; Pt able to assist lean forward away from back of bed using L UE to assist with bedrail. Sling/Swathe able to be readjusted. Transfers  Bed to Chair: Unable to assess           Exercises  Comments: Ben Gamez Assessment   Body structures, Functions, Activity limitations: Decreased functional mobility ; Decreased strength;Decreased safe awareness;Decreased cognition;Decreased endurance  Assessment: 71 y/o male return to ER 4/22/19, few hrs after d/c to SNF setting, with Septicemia, Cellulitis L UE.  L UE Dopplers pending. Recent Hospital Admit 4/14-4/22/19 with R Humerus Fx (Medically Manage) and R Femur Fx (ORIF/IM Nail 4/16/19, Dr Elke Garber), 2 Rib Fxs (Displaced L 7th and 9th) following Fall at Home (ETOH). PMH as noted also including H/O Skull Fx/Elevation, ETOH. Anticipate return to SNF setting with cont PT Intervention. Prognosis: Fair  Decision Making: Medium Complexity  Patient Education: Role of PT, POC, Need to call for assist.   REQUIRES PT FOLLOW UP: Yes  Activity Tolerance  Activity Tolerance: Pt cont require L UE restraint, does appear a bit improved ability to cooperate with PT. Cont confused.       G-Code     OutComes Score                                                  AM-PAC Score  AM-PAC Inpatient Mobility Raw Score : 7  AM-PAC Inpatient T-Scale Score : 26.42  Mobility Inpatient CMS 0-100% Score: 92.36  Mobility Inpatient CMS G-Code Modifier : CM          Goals  Short term goals  Time Frame for Short term goals: Upon d/c acute care setting. Short term goal 1: Bed Mob Mod assist x 2. Short term goal 2: Attempt Transfer to/from chair with lift equipt/assist device, NWB R UE/50% WB R LE with assist x 2-3. Patient Goals   Patient goals : None stated. Plan    Plan  Times per week: 3-5x week while in acute care setting.    Current Treatment Recommendations: Strengthening, Functional Mobility Training, Transfer Training, Safety Education & Training, Patient/Caregiver Education & Training  Safety Devices  Type of devices: Bed alarm in place, Call light within reach, Left in bed, Nurse notified     Therapy Time   Individual Concurrent Group Co-treatment   Time In [address]         Time Out 1115         Minutes 44[address] [facility],

## 2019-04-26 NOTE — PROGRESS NOTES
Patient arrived on the floor, alert and oriented x3, disoriented to situation. Pt denies n/v, diarrhea, SOB, pain. Lancaster catheter in place draining clear, yellow urine to gravity. Right arm in sling. Patient appears drowsy, but awakens easily to speech. Oriented pt to the room and call light. Compression boots on BLE, heel boots in place. Bed in lowest position, bed alarm set, call light within reach. Will continue to monitor.

## 2019-04-26 NOTE — PROGRESS NOTES
4 Eyes Skin Assessment     The patient is being assess for  Shift Handoff    I agree that 2 RN's have performed a thorough Head to Toe Skin Assessment on the patient. ALL assessment sites listed below have been assessed. Areas assessed by both nurses:  [x]   Head, Face, and Ears   [x]   Shoulders, Back, and Chest  [x]   Arms, Elbows, and Hands   [x]   Coccyx, Sacrum, and IschIum  [x]   Legs, Feet, and Heels        Does the Patient have Skin Breakdown?   Yes LDA WOUND CARE was Initiated documentation include the Trinity-wound, Wound Assessment, Measurements, Dressing Treatment, Drainage, and Color\",         Mahesh Prevention initiated:  Yes   Wound Care Orders initiated:  Yes      [address]  nurse consulted for Pressure Injury (Stage 3,4, Unstageable, DTI, NWPT, and Complex wounds), New and Established Ostomies:  Yes      Nurse 1 eSignature: Electronically signed by Sima Wakefield RN on 4/26/19 at 8:32 AM    **SHARE this note so that the co-signing nurse is able to place an eSignature**    Nurse 2 eSignature: Electronically signed by Marciano Ormond, RN on 4/26/19 at 11:20 AM

## 2019-04-26 NOTE — PLAN OF CARE
Problem: Falls - Risk of:  Goal: Will remain free from falls  Description  Will remain free from falls  Outcome: Ongoing  Note:   Fall risk precautions in place. Call light within reach. Frequent visual monitoring in place q1h. Bed/chair alarm activated as applicable. Problem: Safety:  Goal: Free from accidental physical injury  Description  Free from accidental physical injury  Outcome: Ongoing     Problem: Restraint Use - Nonviolent/Non-Self-Destructive Behavior:  Goal: Absence of restraint indications  Description  Absence of restraint indications  Outcome: Ongoing  Note:   Upper extremity soft wrist restraints intact. No S/S of restraint related injury . ROM performed Q2HR. Problem: Risk for Impaired Skin Integrity  Goal: Tissue integrity - skin and mucous membranes  Description  Structural intactness and normal physiological function of skin and  mucous membranes. Outcome: Ongoing     Problem: Restraint Use - Nonviolent/Non-Self-Destructive Behavior:  Goal: Absence of restraint-related injury  Description  Absence of restraint-related injury  Note:   Monitoring patient skin integrity for skin breakdown, turning and repositioning q2h per protocol.

## 2019-04-26 NOTE — PROGRESS NOTES
0830-Pt in bed, AOX2, VSS on RA,denies any pain or sob,a sling to to rt arm and soft left wrist restraint for patient's own safety. 8901- Critical care at bedside rounding, updates provided and see new orders. 1045-Pt's brother called and updated on pt's status including pt's dysphagia problems and uncooperative. He states \"we will think about long term plan and let you know\" per brother. 1200- Pt's brother called back, updated by VERONICA Tadeo.  1400- Pt resting quietly in bed with eyes closed, VSS on RA, no s/s of distress noted. 1510- Pt is getting transferred to rm#[id number] per orders VSS on RA, denies any pain or sob. Report given to receiving RN. 1550- Pt transferred via stretcher per transporter. VSS. All pt's belonging including chart and meds are taken with him to the room.

## 2019-04-26 NOTE — PROGRESS NOTES
Speech Language Pathology  [facility]   Speech Therapy  Daily Dysphagia Treatment Note        Michelle Giang  AGE: 70 y.o. GENDER: male  : 1955  [id number]  EPISODE DATE:  2019  Patient Active Problem List   Diagnosis    Fall    Right humeral fracture    Right femoral fracture (HCC)    Alcohol intoxication ([address]    Hypomagnesemia    Epilepsia ([address]    Closed fracture of neck of right femur ([address]    Alcohol dependence ([address]    Multiple rib fractures    Syncope    Shortness of breath    Bronchitis    Tobacco abuse    ETOH abuse    Severe sepsis (HCC)    HAP (hospital-acquired pneumonia)    Septicemia (HCC)    Cellulitis of left upper extremity    Hypokalemia    Leukemoid reaction    Moderate malnutrition (HCC)     No Known Allergies  Treatment Diagnosis: Dysphagia     Remote Chart Review:  4/15/2019 CXR:  Impression   Unchanged left-sided rib fractures.  No underlying pneumothorax or pleural   effusion.       Suspect lower right-sided rib fractures, age indeterminate.          2019 MBS: Moderate oral stage dysphagia characterized by decreased mastication and decreased lingual manipulation which are both compounded by lethargy.  Prolonged mastication with solids with concern for excess labor with regular solids which were not administered d/t concern for excess labor.  Prolonged, disorganized bolus formation and movement with all trials with reduced bolus cohesion and control and premature bolus loss to the pharynx.  Oral residue with all trials is minimized with cued repeat swallow.  Moderate pharyngeal stage dysphagia characterized by delayed swallow, decreased laryngeal elevation, decreased pharyngeal peristalsis.  Premature spillage to the valleculae with overflow to the hypopharynx with all trials.  Vallecular and pyriform residue with all trials is minimized with cued repeat swallow.  Flash penetration with thin.  Suspect significantly improved swallow function as mental status improves. 4/21/2019 Hospitalist documented hospital course:  70 y. o. male with a past medical history of alcohol dependence and withdrawal seizures  who presented to [facility] he had a mechanical fall. Patient was intoxicated on admission. He was admitted with a right femoral shaft fracture along with humerus fracture and rib fracture. Patient underwent ORIF on 4/16/2019. Hospital course was complicated by delirium tremens and acute blood loss anemia along with bronchitis and possible aspiration pneumonia. Chart Review:   04/22/2019: Re-admitted to hospital with AMS  Patient was just discharged today. Per SNF patient has been confused and found to have redness to his left arm. Significant cough productive of sputum as well. Patient recently discharged for ORIF after femoral fracture. Started on abx for bronchitis. Hx ETOH abuse with confusion. Currently confused. No other associated symptoms. Subjective:     4/26/2019  Current Diet : Puree  Current Liquid Diet : Honey- teaspoon  Patient Currently in Pain: Denies    Comments regarding tolerating Current Diet:   Nurse reports trying meds whole w pudding this AM with no difficulties. Overall low PO intake    Objective:     Pain   Patient Currently in Pain: KOKI    Cognitive/Behavior   Behavior/Cognition: Alert but lethargic with eyes shut but verbally responsive, Cooperative, Confused, Requires cueing    Presentations   Consistencies Administered: Puree, Honey - teaspoon    Positioning   Upright in bed    PO Trials:  · Honey Thick liquids via teaspoon: Patient accepted apple juice x3 and presented with inconistant bolus hold,  increase in AP transit time, decrease lingual control, suspected premature loss and moderate swallow delay. No overt s/s of penetration/aspiration.   · Puree: Patient accepted pudding x2 and presented with a significant increase in AP transit time, moderately reduced lingual control, tongue pumping, suspected premature loss, moderate swallow delay; needs verbal cues for strong swallow and for repeat swallow with all trials due to strong suspicion of base of tongue and pharyngeal residue; however, NO overt s/s of aspiration with puree. Dysphagia Tx:   · Patient unwilling to continue PO trials past 2-3 teaspoons of each honey and puree. · Appears to tolerate puree/honey-teaspoon diet this date without overt s/s of penetration/aspiration    Goals: The patient will tolerate recommended diet without observed clinical signs of aspiration, ongoing  The patient/caregiver will demonstrate understanding of compensatory strategies for improved swallowing safety. , ongoing, pt needs max cues, impulsive, poor safety awareness  The patient will improve oral motor function via therapeutic oral motor exercises to 5/5 each trial., not addressed this date  The patient will tolerate honey-thick liquids without signs and symptoms of aspiration 10/10 via cup. ongoing    Assessment:   Impressions:   Dysphagia Diagnosis: Moderate oral stage dysphagia, Moderate to severe pharyngeal stage dysphagia   · Patient was cooperative yet confused for direct dysphagia tx this session. · Patient unwilling to continue PO trials past 2-3 teaspoons of each honey and puree. · The mental status of patient has declined as well as his PO intake. Per MBS done of 4/19/2019 the patient appears to have declined in his swallow safety. · Appears to tolerate puree/honey-teaspoon diet this date without overt s/s of penetration/aspiration  · Discussed current recommendations with RN and Pulmonologist. MD wants to hold repeat MBS this date and continue monitoring clinically     Diet Recommendations:   Only feed when awake/alert and accepting PO  Puree  Honey- via teaspoon  Recommended Form of Meds: Crushed in puree as able    Strategies:   Upright as possible for all oral intake, Remain upright for 30-45 minutes after meals, Total feed, Eat/Feed slowly, Swallow 2 times per bite/sip, Small bites/sips    Education:  Patient Education: Completed on recommendations/plan  Patient Education Response: Verbalizes understanding, Needs reinforcement    Prognosis:   Guarded for safe diet tolerance    Plan:     Continue Dysphagia Therapy: YES    Interventions: Therapeutic Interventions: Therapeutic PO trials with SLP, Diet tolerance monitoring, Laryngeal exercises, Pharyngeal exercises, Oral motor exercises  Duration/Frequency of therapy while on unit: Duration/Frequency of Treatment  Duration/Frequency of Treatment: 3-5x/wk while on acute unit  Discharge Instructions:   Anticipate Yes for further skilled Speech Therapy for Dysphagia at discharge    This note serves as a D/C Summary in the event that this patient is discharged prior to the next therapy session. Coded treatment time:10  Total treatment time: 22    Electronically signed by NKECHI Russo  Clinician on 4/26/2019 at 10:20 AM    This Speech Language Pathologist was present directed the patient's care, made skilled judgement and was responsible for assessment and treatment. Sana Duran, [address] [place] [address], [id number]  Speech-Language Pathologist

## 2019-04-26 NOTE — PROGRESS NOTES
monitor     Acute hypoxic respiratory failure - Resolved; now on room air  - POA with tachypnea and satting 89 on room air   - Secondary to pneumonia  - Titrate oxygen down to keep saturation above 90%     Hypokalemia  - Monitor and replace as needed     Right femoral fracture shaft and subtrochanteric  - s/p ORIF on 4/16/19   - PT/OT      Humeral fracture non displaced   - Pain management  - Continue sling      Alcohol dependence with abuse  - Not withdrawing  - Continue to monitor     History of seizure  - Stable; continue Phenytoin    Confusion - Possibly at baseline  - Will monitor and provide supportive care.          Code Status: Full Code    PT/OT Eval Status:ordered    Discharge plan - ct care     The patient and / or the family were informed of the results of any tests, a time was given to answer questions, a plan was proposed and they agreed with plan.       Gustavo Barboza MD

## 2019-04-27 LAB
ALBUMIN SERPL-MCNC: 1.7 G/DL (ref 3.4–5)
ANION GAP SERPL CALCULATED.3IONS-SCNC: 14 MMOL/L (ref 3–16)
BASOPHILS ABSOLUTE: 0.1 K/UL (ref 0–0.2)
BASOPHILS RELATIVE PERCENT: 1 %
BLOOD CULTURE, ROUTINE: NORMAL
BUN BLDV-MCNC: 5 MG/DL (ref 7–20)
CALCIUM SERPL-MCNC: 7.5 MG/DL (ref 8.3–10.6)
CHLORIDE BLD-SCNC: 102 MMOL/L (ref 99–110)
CO2: 25 MMOL/L (ref 21–32)
CREAT SERPL-MCNC: <0.5 MG/DL (ref 0.8–1.3)
CULTURE, BLOOD 2: NORMAL
EOSINOPHILS ABSOLUTE: 0.5 K/UL (ref 0–0.6)
EOSINOPHILS RELATIVE PERCENT: 4 %
GFR AFRICAN AMERICAN: >60
GFR NON-AFRICAN AMERICAN: >60
GLUCOSE BLD-MCNC: 81 MG/DL (ref 70–99)
HCT VFR BLD CALC: 23.8 % (ref 40.5–52.5)
HEMOGLOBIN: 8 G/DL (ref 13.5–17.5)
LYMPHOCYTES ABSOLUTE: 0.3 K/UL (ref 1–5.1)
LYMPHOCYTES RELATIVE PERCENT: 3 %
MAGNESIUM: 1.8 MG/DL (ref 1.8–2.4)
MCH RBC QN AUTO: 34.8 PG (ref 26–34)
MCHC RBC AUTO-ENTMCNC: 33.6 G/DL (ref 31–36)
MCV RBC AUTO: 103.6 FL (ref 80–100)
METAMYELOCYTES RELATIVE PERCENT: 1 %
MONOCYTES ABSOLUTE: 1.3 K/UL (ref 0–1.3)
MONOCYTES RELATIVE PERCENT: 11 %
MYELOCYTE PERCENT: 3 %
NEUTROPHILS ABSOLUTE: 9.2 K/UL (ref 1.7–7.7)
NEUTROPHILS RELATIVE PERCENT: 77 %
PDW BLD-RTO: 14 % (ref 12.4–15.4)
PHOSPHORUS: 3.4 MG/DL (ref 2.5–4.9)
PLATELET # BLD: 319 K/UL (ref 135–450)
PMV BLD AUTO: 9.7 FL (ref 5–10.5)
POTASSIUM SERPL-SCNC: 3.3 MMOL/L (ref 3.5–5.1)
POTASSIUM SERPL-SCNC: 3.4 MMOL/L (ref 3.5–5.1)
PROCALCITONIN: 0.32 NG/ML (ref 0–0.15)
RBC # BLD: 2.29 M/UL (ref 4.2–5.9)
SODIUM BLD-SCNC: 141 MMOL/L (ref 136–145)
WBC # BLD: 11.4 K/UL (ref 4–11)

## 2019-04-27 PROCEDURE — 2580000003 HC RX 258: Performed by: INTERNAL MEDICINE

## 2019-04-27 PROCEDURE — 2500000003 HC RX 250 WO HCPCS: Performed by: INTERNAL MEDICINE

## 2019-04-27 PROCEDURE — 6360000002 HC RX W HCPCS: Performed by: INTERNAL MEDICINE

## 2019-04-27 PROCEDURE — 6370000000 HC RX 637 (ALT 250 FOR IP): Performed by: INTERNAL MEDICINE

## 2019-04-27 PROCEDURE — 1200000000 HC SEMI PRIVATE

## 2019-04-27 PROCEDURE — 84145 PROCALCITONIN (PCT): CPT

## 2019-04-27 PROCEDURE — 84132 ASSAY OF SERUM POTASSIUM: CPT

## 2019-04-27 PROCEDURE — 94760 N-INVAS EAR/PLS OXIMETRY 1: CPT

## 2019-04-27 PROCEDURE — 83735 ASSAY OF MAGNESIUM: CPT

## 2019-04-27 PROCEDURE — 80069 RENAL FUNCTION PANEL: CPT

## 2019-04-27 PROCEDURE — 6370000000 HC RX 637 (ALT 250 FOR IP): Performed by: NURSE PRACTITIONER

## 2019-04-27 PROCEDURE — 6370000000 HC RX 637 (ALT 250 FOR IP): Performed by: HOSPITALIST

## 2019-04-27 PROCEDURE — 99232 SBSQ HOSP IP/OBS MODERATE 35: CPT | Performed by: INTERNAL MEDICINE

## 2019-04-27 PROCEDURE — 85025 COMPLETE CBC W/AUTO DIFF WBC: CPT

## 2019-04-27 RX ORDER — THIAMINE MONONITRATE (VIT B1) 100 MG
100 TABLET ORAL DAILY
Status: DISCONTINUED | OUTPATIENT
Start: 2019-04-27 | End: 2019-05-07 | Stop reason: HOSPADM

## 2019-04-27 RX ORDER — PHENYTOIN SODIUM 50 MG/ML
100 INJECTION, SOLUTION INTRAMUSCULAR; INTRAVENOUS EVERY 8 HOURS
Status: DISCONTINUED | OUTPATIENT
Start: 2019-04-27 | End: 2019-05-07

## 2019-04-27 RX ORDER — DEXTROSE, SODIUM CHLORIDE, AND POTASSIUM CHLORIDE 5; .45; .15 G/100ML; G/100ML; G/100ML
INJECTION INTRAVENOUS CONTINUOUS
Status: DISCONTINUED | OUTPATIENT
Start: 2019-04-27 | End: 2019-05-07

## 2019-04-27 RX ORDER — PHENYTOIN SODIUM 100 MG/1
200 CAPSULE, EXTENDED RELEASE ORAL 2 TIMES DAILY
Status: DISCONTINUED | OUTPATIENT
Start: 2019-04-27 | End: 2019-04-27

## 2019-04-27 RX ADMIN — FOLIC ACID 1 MG: 1 TABLET ORAL at 12:16

## 2019-04-27 RX ADMIN — QUETIAPINE FUMARATE 25 MG: 25 TABLET ORAL at 21:38

## 2019-04-27 RX ADMIN — POTASSIUM CHLORIDE, DEXTROSE MONOHYDRATE AND SODIUM CHLORIDE: 150; 5; 450 INJECTION, SOLUTION INTRAVENOUS at 23:52

## 2019-04-27 RX ADMIN — PHENYTOIN SODIUM 100 MG: 50 INJECTION INTRAMUSCULAR; INTRAVENOUS at 06:46

## 2019-04-27 RX ADMIN — POTASSIUM CHLORIDE 20 MEQ: 29.8 INJECTION, SOLUTION INTRAVENOUS at 15:46

## 2019-04-27 RX ADMIN — PHENYTOIN SODIUM 100 MG: 50 INJECTION INTRAMUSCULAR; INTRAVENOUS at 21:38

## 2019-04-27 RX ADMIN — POTASSIUM CHLORIDE 20 MEQ: 29.8 INJECTION, SOLUTION INTRAVENOUS at 23:50

## 2019-04-27 RX ADMIN — THERA TABS 1 TABLET: TAB at 12:16

## 2019-04-27 RX ADMIN — POTASSIUM CHLORIDE, DEXTROSE MONOHYDRATE AND SODIUM CHLORIDE: 150; 5; 450 INJECTION, SOLUTION INTRAVENOUS at 13:47

## 2019-04-27 RX ADMIN — ENOXAPARIN SODIUM 40 MG: 40 INJECTION SUBCUTANEOUS at 12:16

## 2019-04-27 RX ADMIN — AMPICILLIN AND SULBACTAM 3 G: 1; 2 INJECTION, POWDER, FOR SOLUTION INTRAMUSCULAR; INTRAVENOUS at 23:50

## 2019-04-27 RX ADMIN — Medication 100 MG: at 12:16

## 2019-04-27 RX ADMIN — AMPICILLIN AND SULBACTAM 3 G: 1; 2 INJECTION, POWDER, FOR SOLUTION INTRAMUSCULAR; INTRAVENOUS at 19:09

## 2019-04-27 RX ADMIN — PHENYTOIN SODIUM 200 MG: 100 CAPSULE ORAL at 12:16

## 2019-04-27 RX ADMIN — AMPICILLIN AND SULBACTAM 3 G: 1; 2 INJECTION, POWDER, FOR SOLUTION INTRAMUSCULAR; INTRAVENOUS at 14:53

## 2019-04-27 RX ADMIN — PHENYTOIN SODIUM 100 MG: 50 INJECTION INTRAMUSCULAR; INTRAVENOUS at 00:27

## 2019-04-27 RX ADMIN — POTASSIUM CHLORIDE 20 MEQ: 29.8 INJECTION, SOLUTION INTRAVENOUS at 13:48

## 2019-04-27 ASSESSMENT — PAIN DESCRIPTION - PROGRESSION
CLINICAL_PROGRESSION: NOT CHANGED

## 2019-04-27 ASSESSMENT — PAIN DESCRIPTION - ORIENTATION
ORIENTATION: RIGHT
ORIENTATION: RIGHT

## 2019-04-27 ASSESSMENT — PAIN DESCRIPTION - PAIN TYPE
TYPE: SURGICAL PAIN
TYPE: SURGICAL PAIN

## 2019-04-27 ASSESSMENT — PAIN SCALES - GENERAL
PAINLEVEL_OUTOF10: 0
PAINLEVEL_OUTOF10: 0

## 2019-04-27 ASSESSMENT — PAIN DESCRIPTION - LOCATION
LOCATION: ARM;HIP
LOCATION: ARM;HIP

## 2019-04-27 ASSESSMENT — PAIN DESCRIPTION - ONSET
ONSET: ON-GOING
ONSET: ON-GOING

## 2019-04-27 ASSESSMENT — PAIN DESCRIPTION - FREQUENCY
FREQUENCY: INTERMITTENT
FREQUENCY: INTERMITTENT

## 2019-04-27 ASSESSMENT — PAIN DESCRIPTION - DESCRIPTORS
DESCRIPTORS: DISCOMFORT
DESCRIPTORS: DISCOMFORT

## 2019-04-27 ASSESSMENT — PAIN SCALES - WONG BAKER
WONGBAKER_NUMERICALRESPONSE: 0
WONGBAKER_NUMERICALRESPONSE: 0

## 2019-04-27 NOTE — PLAN OF CARE
Pt free from falls this shift. Fall precautions in place at all times. Call light always within reach. Pt able and agreeable to contact for safety appropriately. Pt able to express presence/absence of pain and rate pain appropriately using numerical scale. Pain/discomfort being managed with PRN analgesics per MD orders (see MAR). Pain assessed every shift and after interventions. Skin assessment performed each shift per protocol. Patient turned and repositioned every two hours and prn with pillow support. Patient checked for incontence every two hours. Restraints remain in place to L hand to prevent pt from pulling lines and self harm. Nutrition encouraged but pt refusing food/beverages at this time.

## 2019-04-27 NOTE — FLOWSHEET NOTE
Restraints reordered at this time per NP [facility]. L hand in soft restraints. R arm has sling in place. Bilateral blue boots on along with pneumatic devices on. L upper PICC is patent and has IV antibiotics infusing. White catheter cap intact to PICC. Pt refusing any foods at this time. Pills crushed and placed in apple sauce. Pt tolerated well albeit was reluctant to take anything by mouth. On room air and does not appear to be in any kind of pain or distress. Lancaster intact and draining clear yellow urine. On tele NSR. Does not appear to be any alcohol withdrawal. Nicotine patch placed to L deltoid. Pt has lung congestion and has infrequent coughs. All fall precautions in place. Call light in reach.

## 2019-04-27 NOTE — FLOWSHEET NOTE
Pt is more alert at this time, but very much confused. Has take tele leads off several times. L wrist restraint shortened. Sling intact to R arm. Keeps taking boots off feet. Bilateral boots replaced and placed properly. Pt asking to take a shower, asking for Sammy Aragon, where's his brother? Tried to reorient pt, but he forgets he's in the hospital. Rattling cough. Pt has mucousy/phlegmy breathing. Took two bites of thickened water. Lancaster remains intact. All fall precautions in place. Bed alarm on.

## 2019-04-27 NOTE — PLAN OF CARE
Problem: Falls - Risk of:  Goal: Absence of physical injury  Description  Absence of physical injury  Note:   Pt free from falls this shift. Fall precautions in place at all times. Call light always within reach. Pt able and agreeable to contact for safety appropriately. Problem: Safety:  Goal: Free from intentional harm  Description  Free from intentional harm  Note:   Pt free from falls this shift. Fall precautions in place at all times. Call light always withinreach. Pt able and agreeable to contact for safety appropriately. Bed alarm on. Problem: Pain:  Goal: Patient's pain/discomfort is manageable  Description  Patient's pain/discomfort is manageable  Note:   Pain/discomfort being managed with PRN analgesics per MD orders. Pt able to express presence and absence of pain and rate pain appropriately using numerical scale. Problem: Skin Integrity:  Goal: Skin integrity will stabilize  Description  Skin integrity will stabilize  Note:   Skin assessment performed each shift and as needed. Pt will be enc to turn, reposition, and ambulate. Skin will be kept clean and dry. Problem: Restraint Use - Nonviolent/Non-Self-Destructive Behavior:  Goal: Absence of restraint-related injury  Description  Absence of restraint-related injury  Note:   Restraints will be discontinued when pt no longer pulling at burnette and tele box. Problem: Risk for Impaired Skin Integrity  Goal: Tissue integrity - skin and mucous membranes  Description  Structural intactness and normal physiological function of skin and  mucous membranes. Note:   Skin assessment performed each shift and as needed. Pt will be enc to turn, reposition, and ambulate. Skin will be kept clean and dry.

## 2019-04-27 NOTE — FLOWSHEET NOTE
Pt awake at this time. Mepilex removed from coccyx with no skin impairments observed. L arm is edematous and is propped up on a pillow. PICC remains intact, patent and has fluids at [facility]. Bilateral foam boots on along with pneumatic devices. Bp's being done on L lower leg. Pt is very difficult to understand d/t increased amounts of phlegm. VSS. Labs drawn and sent to lab. All fall precautions remain in place. Pt refused any food last night. Only had a couple spoons of thickened water. Sling intact to R arm. Dressing intact to R hip. Call light in reach. Pt repositioned for comfort and pad changed.

## 2019-04-27 NOTE — PROGRESS NOTES
organomegaly  Extremities: Right arm in a sling; L arm rash + and swelling  Skin: Skin color, texture, turgor normal. No rashes or lesions  Neurologic: Mental status: Alert, oriented to self and place, easily confused     Data    LABS  CBC:   Recent Labs     04/25/19  0540 04/26/19  0600 04/27/19  0646   WBC 10.8 11.5* 11.4*   HGB 7.7* 7.7* 8.0*   HCT 22.6* 23.1* 23.8*   .9* 102.7* 103.6*    273 319     BMP:   Recent Labs     04/25/19  0540 04/25/19  1110 04/26/19  0600 04/27/19  0646     --  139 141   K 3.5 4.1 3.5 3.3*     --  103 102   CO2 27  --  27 25   PHOS 2.9  --  3.1 3.4   BUN 5*  --  5* 5*   CREATININE <0.5*  --  <0.5* <0.5*     POC GLUCOSE:  No results for input(s): POCGLU in the last 72 hours. LIVER PROFILE:   Recent Labs     04/25/19  0540 04/26/19  0600 04/27/19  0646   LABALBU 1.6* 1.9* 1.7*     PT/INR: No results for input(s): PROTIME, INR in the last 72 hours. APTT: No results for input(s): APTT in the last 72 hours. UA:  No results for input(s): NITRITE, COLORU, PHUR, LABCAST, WBCUA, RBCUA, MUCUS, TRICHOMONAS, YEAST, BACTERIA, CLARITYU, SPECGRAV, LEUKOCYTESUR, UROBILINOGEN, BILIRUBINUR, BLOODU, GLUCOSEU, KETUA, AMORPHOUS in the last 72 hours. Microbiology:  Wound Culture: No results for input(s): WNDABS, ORG in the last 72 hours. Invalid input(s):  LABGRAM  Nasal Culture:   No results for input(s): ORG, MRSAPCR in the last 72 hours. Blood Culture:   No results for input(s): BC, BLOODCULT2 in the last 72 hours. Fungal Culture:   No results for input(s): FUNGSM in the last 72 hours. No results for input(s): FUNCXBLD in the last 72 hours. CSF Culture:  No results for input(s): COLORCSF, APPEARCSF, CFTUBE, CLOTCSF, WBCCSF, RBCCSF, NEUTCSF, NUMCELLSCSF, LYMPHSCSF, MONOCSF, GLUCCSF, VOLCSF in the last 72 hours. Respiratory Culture:  No results for input(s): Pansy Eunice in the last 72 hours. AFB:No results for input(s): AFBSMEAR in the last 72 hours.   Urine Culture  No results for input(s): LABURIN in the last 72 hours. RADIOLOGY:    VL Extremity Venous Left   Final Result      XR CHEST PORTABLE   Final Result   Findings concerning for worsening edema or infection on the right. CTA PULMONARY W CONTRAST   Final Result   No evidence pulmonary embolism to the segmental level. Small bilateral pleural effusions as well as multifocal bilateral airspace   disease, likely pneumonia. CT Head WO Contrast   Final Result   No acute intracranial abnormality. XR CHEST PORTABLE   Final Result   Ill-defined airspace opacity in the infrahilar region on the right. Findings   raise the question of pneumonia. CONSULTS:    IP CONSULT TO SOCIAL WORK    ASSESSMENT AND PLAN:      Active Problems:    Severe sepsis (HCC)    HAP (hospital-acquired pneumonia)    Septicemia ([address]    Cellulitis of left upper extremity    Hypokalemia    Leukemoid reaction    Moderate malnutrition ([address]  Resolved Problems:    * No resolved hospital problems. *      Admitted for Acute metabolic encephalopathy likley sec to Aspiration Pneumonia  Sepsis POA sec to pneumonia( tachycardia, inc wbc + foci of infection) - resolved   Acute hypoxic respiratory failure - Resolved; now on room air  Low nancy for GNR infection   abx changed to unasyn day 1   Received vanco and cefepime   Severe dysphagia , saline in mouth with gurgling on exma today  Npo, gentle ivf           L arm swelling   - venous doppler   Left UE picc line from prev admission   Left   Isolated acute totally occluded superficial venous thrombosis involving a   branch of the basilic vein in zone 5 only.    No other evidence of deep vein thrombosis or superficial vein thrombosis of   the left upper extremity or right internal jugular vein.        Hypokalemia  - Monitor and replace as needed     Right femoral fracture shaft and subtrochanteric  - s/p ORIF on 4/16/19   - PT/OT      Humeral fracture non displaced   - Pain management  - Continue sling      Alcohol dependence with abuse  - Not withdrawing  - Continue to monitor     History of seizure  - Stable; continue Phenytoin    Confusion - Possibly at baseline  - Will monitor and provide supportive care.          Code Status: Full Code    PT/OT Eval Status:ordered    Discharge plan - ct care     The patient and / or the family were informed of the results of any tests, a time was given to answer questions, a plan was proposed and they agreed with plan. Jesse Hirsch MD

## 2019-04-28 LAB
ABO/RH: NORMAL
ALBUMIN SERPL-MCNC: 1.5 G/DL (ref 3.4–5)
ANION GAP SERPL CALCULATED.3IONS-SCNC: 8 MMOL/L (ref 3–16)
ANTIBODY SCREEN: NORMAL
BANDED NEUTROPHILS RELATIVE PERCENT: 1 % (ref 0–7)
BASOPHILS ABSOLUTE: 0.1 K/UL (ref 0–0.2)
BASOPHILS RELATIVE PERCENT: 1 %
BLOOD BANK DISPENSE STATUS: NORMAL
BLOOD BANK PRODUCT CODE: NORMAL
BPU ID: NORMAL
BUN BLDV-MCNC: 2 MG/DL (ref 7–20)
CALCIUM SERPL-MCNC: 6.9 MG/DL (ref 8.3–10.6)
CHLORIDE BLD-SCNC: 104 MMOL/L (ref 99–110)
CO2: 25 MMOL/L (ref 21–32)
CREAT SERPL-MCNC: <0.5 MG/DL (ref 0.8–1.3)
DESCRIPTION BLOOD BANK: NORMAL
EOSINOPHILS ABSOLUTE: 0.1 K/UL (ref 0–0.6)
EOSINOPHILS RELATIVE PERCENT: 1 %
GFR AFRICAN AMERICAN: >60
GFR NON-AFRICAN AMERICAN: >60
GLUCOSE BLD-MCNC: 458 MG/DL (ref 70–99)
HCT VFR BLD CALC: 20.6 % (ref 40.5–52.5)
HEMOGLOBIN: 6.4 G/DL (ref 13.5–17.5)
IRON SATURATION: 37 % (ref 20–50)
IRON: 34 UG/DL (ref 59–158)
LYMPHOCYTES ABSOLUTE: 1.2 K/UL (ref 1–5.1)
LYMPHOCYTES RELATIVE PERCENT: 14 %
MAGNESIUM: 1.3 MG/DL (ref 1.8–2.4)
MCH RBC QN AUTO: 34 PG (ref 26–34)
MCHC RBC AUTO-ENTMCNC: 31.1 G/DL (ref 31–36)
MCV RBC AUTO: 109.4 FL (ref 80–100)
MONOCYTES ABSOLUTE: 1.5 K/UL (ref 0–1.3)
MONOCYTES RELATIVE PERCENT: 18 %
NEUTROPHILS ABSOLUTE: 5.5 K/UL (ref 1.7–7.7)
NEUTROPHILS RELATIVE PERCENT: 65 %
PDW BLD-RTO: 15.5 % (ref 12.4–15.4)
PHOSPHORUS: 2.4 MG/DL (ref 2.5–4.9)
PLATELET # BLD: 286 K/UL (ref 135–450)
PMV BLD AUTO: 10.2 FL (ref 5–10.5)
POTASSIUM SERPL-SCNC: 5.2 MMOL/L (ref 3.5–5.1)
PROCALCITONIN: 0.36 NG/ML (ref 0–0.15)
RBC # BLD: 1.88 M/UL (ref 4.2–5.9)
SODIUM BLD-SCNC: 137 MMOL/L (ref 136–145)
TOTAL IRON BINDING CAPACITY: 92 UG/DL (ref 260–445)
WBC # BLD: 8.3 K/UL (ref 4–11)

## 2019-04-28 PROCEDURE — 2580000003 HC RX 258: Performed by: INTERNAL MEDICINE

## 2019-04-28 PROCEDURE — P9016 RBC LEUKOCYTES REDUCED: HCPCS

## 2019-04-28 PROCEDURE — 6360000002 HC RX W HCPCS: Performed by: NURSE PRACTITIONER

## 2019-04-28 PROCEDURE — 86850 RBC ANTIBODY SCREEN: CPT

## 2019-04-28 PROCEDURE — 6360000002 HC RX W HCPCS: Performed by: INTERNAL MEDICINE

## 2019-04-28 PROCEDURE — 2500000003 HC RX 250 WO HCPCS: Performed by: INTERNAL MEDICINE

## 2019-04-28 PROCEDURE — 94760 N-INVAS EAR/PLS OXIMETRY 1: CPT

## 2019-04-28 PROCEDURE — 80069 RENAL FUNCTION PANEL: CPT

## 2019-04-28 PROCEDURE — 36430 TRANSFUSION BLD/BLD COMPNT: CPT

## 2019-04-28 PROCEDURE — 83540 ASSAY OF IRON: CPT

## 2019-04-28 PROCEDURE — 6370000000 HC RX 637 (ALT 250 FOR IP): Performed by: INTERNAL MEDICINE

## 2019-04-28 PROCEDURE — 86901 BLOOD TYPING SEROLOGIC RH(D): CPT

## 2019-04-28 PROCEDURE — 86923 COMPATIBILITY TEST ELECTRIC: CPT

## 2019-04-28 PROCEDURE — 86900 BLOOD TYPING SEROLOGIC ABO: CPT

## 2019-04-28 PROCEDURE — 83550 IRON BINDING TEST: CPT

## 2019-04-28 PROCEDURE — 83735 ASSAY OF MAGNESIUM: CPT

## 2019-04-28 PROCEDURE — 84145 PROCALCITONIN (PCT): CPT

## 2019-04-28 PROCEDURE — 1200000000 HC SEMI PRIVATE

## 2019-04-28 PROCEDURE — 99233 SBSQ HOSP IP/OBS HIGH 50: CPT | Performed by: INTERNAL MEDICINE

## 2019-04-28 PROCEDURE — 6360000002 HC RX W HCPCS: Performed by: HOSPITALIST

## 2019-04-28 PROCEDURE — 85025 COMPLETE CBC W/AUTO DIFF WBC: CPT

## 2019-04-28 RX ORDER — 0.9 % SODIUM CHLORIDE 0.9 %
250 INTRAVENOUS SOLUTION INTRAVENOUS ONCE
Status: COMPLETED | OUTPATIENT
Start: 2019-04-28 | End: 2019-04-28

## 2019-04-28 RX ADMIN — PHENYTOIN SODIUM 100 MG: 50 INJECTION INTRAMUSCULAR; INTRAVENOUS at 21:30

## 2019-04-28 RX ADMIN — ENOXAPARIN SODIUM 40 MG: 40 INJECTION SUBCUTANEOUS at 21:30

## 2019-04-28 RX ADMIN — AMPICILLIN AND SULBACTAM 3 G: 1; 2 INJECTION, POWDER, FOR SOLUTION INTRAMUSCULAR; INTRAVENOUS at 18:52

## 2019-04-28 RX ADMIN — AMPICILLIN AND SULBACTAM 3 G: 1; 2 INJECTION, POWDER, FOR SOLUTION INTRAMUSCULAR; INTRAVENOUS at 05:50

## 2019-04-28 RX ADMIN — MAGNESIUM SULFATE HEPTAHYDRATE 1 G: 1 INJECTION, SOLUTION INTRAVENOUS at 11:09

## 2019-04-28 RX ADMIN — PHENYTOIN SODIUM 100 MG: 50 INJECTION INTRAMUSCULAR; INTRAVENOUS at 05:50

## 2019-04-28 RX ADMIN — MAGNESIUM SULFATE HEPTAHYDRATE 1 G: 1 INJECTION, SOLUTION INTRAVENOUS at 20:09

## 2019-04-28 RX ADMIN — HALOPERIDOL LACTATE 2 MG: 5 INJECTION, SOLUTION INTRAMUSCULAR at 02:10

## 2019-04-28 RX ADMIN — Medication 10 ML: at 09:57

## 2019-04-28 RX ADMIN — AMPICILLIN AND SULBACTAM 3 G: 1; 2 INJECTION, POWDER, FOR SOLUTION INTRAMUSCULAR; INTRAVENOUS at 23:44

## 2019-04-28 RX ADMIN — MAGNESIUM SULFATE HEPTAHYDRATE 1 G: 1 INJECTION, SOLUTION INTRAVENOUS at 21:35

## 2019-04-28 RX ADMIN — POTASSIUM CHLORIDE, DEXTROSE MONOHYDRATE AND SODIUM CHLORIDE: 150; 5; 450 INJECTION, SOLUTION INTRAVENOUS at 13:44

## 2019-04-28 RX ADMIN — Medication 10 ML: at 23:44

## 2019-04-28 RX ADMIN — Medication 10 ML: at 21:40

## 2019-04-28 RX ADMIN — AMPICILLIN AND SULBACTAM 3 G: 1; 2 INJECTION, POWDER, FOR SOLUTION INTRAMUSCULAR; INTRAVENOUS at 12:29

## 2019-04-28 RX ADMIN — MAGNESIUM SULFATE HEPTAHYDRATE 1 G: 1 INJECTION, SOLUTION INTRAVENOUS at 09:57

## 2019-04-28 RX ADMIN — SODIUM CHLORIDE 250 ML: 9 INJECTION, SOLUTION INTRAVENOUS at 16:07

## 2019-04-28 RX ADMIN — PHENYTOIN SODIUM 100 MG: 50 INJECTION INTRAMUSCULAR; INTRAVENOUS at 14:14

## 2019-04-28 ASSESSMENT — PAIN DESCRIPTION - PROGRESSION

## 2019-04-28 ASSESSMENT — PAIN SCALES - WONG BAKER

## 2019-04-28 ASSESSMENT — PAIN SCALES - GENERAL: PAINLEVEL_OUTOF10: 0

## 2019-04-28 NOTE — FLOWSHEET NOTE
Pt has been restless and requires to be repositioned often. Pt trying to get out of bed and states he needs to go downstairs. Assist x 3 to get pt positioned safely in bed. L wrist restraint intact. Will not keep R sling intact. Pulls on telemetry and burnette if L hand can reach. Bilateral blue boots repositioned and placed correctly. Pt has copious amounts of phlegm/mucous but refuses to be suctioned. Remains very confused. Speech is slurred and difficult to understand d/t phlegm. Dressing intact to R hip. Nicotine patch to L deltoid. All fall precautions in place. Bed alarm on. Call light in reach.

## 2019-04-28 NOTE — PROGRESS NOTES
1 UPRBC infused without complication. Pt transferred to specialty mattress, pt tolerated well. Fall precautions in place. 4 side rails up, L wrist restraint off. Pt quietly resting in bed. Call light at bedside. Will cont to monitor. Electronically signed by Florina Lemus RN on 4/28/2019 at 6:59 PM

## 2019-04-28 NOTE — PROGRESS NOTES
Pulmonary Progress Note    Date of Admission: 4/22/2019   LOS: 5 days       CC:  ams    Subjective:  Baseline confusion. Increased confusion over night. ROS:    No nausea     No Vomiting   No chest pain        PHYSICAL EXAM:    Blood pressure (!) 150/74, pulse 106, temperature 98.3 °F (36.8 °C), temperature source Axillary, resp. rate 18, height 5' 9\" (1.753 m), weight 173 lb 1 oz (78.5 kg), SpO2 95 %.'  Gen: No distress. ENT:   Resp: No accessory muscle use. No crackles. No wheezes. ++ rhonchi. CV: Regular rate. Regular rhythm. No murmur or rub. No edema. Skin: Warm, dry, normal texture and turgor. No nodule on exposed extremities. M/S: No cyanosis. No clubbing. No joint deformity. Psych:  Awake.  Alert     Medications:    Scheduled Meds:   sodium chloride  250 mL Intravenous Once    ampicillin-sulbactam  3 g Intravenous Q6H    thiamine  100 mg Oral Daily    phenytoin  100 mg Intravenous Q8H    nicotine  1 patch Transdermal Q24H    sodium chloride flush  10 mL Intravenous 2 times per day    sodium chloride flush  10 mL Intravenous 2 times per day    enoxaparin  40 mg Subcutaneous Daily    multivitamin  1 tablet Oral Daily    folic acid  1 mg Oral Daily    QUEtiapine  25 mg Oral BID       Continuous Infusions:   dextrose 5% and 0.45% NaCl with KCl 20 mEq 75 mL/hr at 04/27/19 2352       PRN Meds:  sodium chloride flush, potassium chloride, sodium chloride flush, magnesium sulfate, haloperidol lactate    Labs reviewed:  CBC:   Recent Labs     04/26/19  0600 04/27/19  0646 04/28/19  0645   WBC 11.5* 11.4* 8.3   HGB 7.7* 8.0* 6.4*   HCT 23.1* 23.8* 20.6*   .7* 103.6* 109.4*    319 286     BMP:   Recent Labs     04/26/19  0600 04/27/19  0646 04/27/19  2150 04/28/19  0645    141  --  137   K 3.5 3.3* 3.4* 5.2*    102  --  104   CO2 27 25  --  25   PHOS 3.1 3.4  --  2.4*   BUN 5* 5*  --  2*   CREATININE <0.5* <0.5*  --  <0.5*     LIVER PROFILE:   No results for

## 2019-04-28 NOTE — PROGRESS NOTES
Kwaku Flores returned the phone call. We discussed the altered mental status. He is has a hx of etohism as well. His is familiar with neurologic issues with etoh. I advised we are considering Wernicke's and I advise he look this up since not familiar with it. He was concerned seroquel was contributing. We discussed the \"double edge sword\" of agitated vs ams. Will stop based on request.     We discussed code status. He will discussed with family.
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
## 2019-04-28 NOTE — PLAN OF CARE
Problem: Falls - Risk of:  Goal: Absence of physical injury  Description  Absence of physical injury  Note:   Pt free from falls this shift. Fall precautions in place at all times. Call light always within reach. Pt able and agreeable to contact for safety appropriately. Problem: Safety:  Goal: Free from intentional harm  Description  Free from intentional harm  Note:   Pt free from falls this shift. Fall precautions in place at all times. Call light always withinreach. Pt able and agreeable to contact for safety appropriately. Bed alarm on. Problem: Skin Integrity:  Goal: Skin integrity will stabilize  Description  Skin integrity will stabilize  Note:   Skin assessment performed each shift and as needed. Pt will be enc to turn, reposition, and ambulate. Skin will be kept clean and dry. Problem: Restraint Use - Nonviolent/Non-Self-Destructive Behavior:  Goal: Absence of restraint indications  Description  Absence of restraint indications  Note:   Restraints will be discontinued when patient no longer pulling at burnette or removing tele monitor or when medical equipment no longer necessary.

## 2019-04-28 NOTE — PROGRESS NOTES
Pt asleep arouses to name. Pt reaching for burnette and tele monitor while awake. Pt bathed and repositioned. Pt suctioned as needed. L wrist restraint in place. 4 side rails in place. Call light with in reach. Will cont to monitor. Electronically signed by Damien Mc RN on 4/28/2019 at 2:02 PM

## 2019-04-28 NOTE — FLOWSHEET NOTE
Pt sleeping at this time. Haldol effective. No s/s of pain or distress. Respiratory easy/even. L wrist restraint remains intact. L arm remains edematous r/t superficial DVT. Bilateral foam boots on. All fall precautions in place. Bed alarm on.

## 2019-04-28 NOTE — PROGRESS NOTES
Progress Note  Admit Date: 4/22/2019      PCP: Kennteh Coleman MD     CC: F/U for pneumonia    SUBJECTIVE / Interval History: 4.27- Remains confused, lot of saliva in moth, gurgling, called nurse for suction, in restraints  4.28- more awake today, denies any pain         Allergies  Patient has no known allergies. Medications    Scheduled Meds:   sodium chloride  250 mL Intravenous Once    ampicillin-sulbactam  3 g Intravenous Q6H    thiamine  100 mg Oral Daily    phenytoin  100 mg Intravenous Q8H    nicotine  1 patch Transdermal Q24H    sodium chloride flush  10 mL Intravenous 2 times per day    sodium chloride flush  10 mL Intravenous 2 times per day    enoxaparin  40 mg Subcutaneous Daily    multivitamin  1 tablet Oral Daily    folic acid  1 mg Oral Daily     Continuous Infusions:   dextrose 5% and 0.45% NaCl with KCl 20 mEq 75 mL/hr at 04/28/19 1344       PRN Meds:  sodium chloride flush, potassium chloride, sodium chloride flush, magnesium sulfate, haloperidol lactate    Vitals    Vitals: BP 99/63   Pulse 91   Temp 98.7 °F (37.1 °C) (Axillary)   Resp 17   Ht 5' 9\" (1.753 m)   Wt 173 lb 1 oz (78.5 kg)   SpO2 94%   BMI 25.56 kg/m²   General appearance: WD/WN 70y.o. year-old  male who is alert, appears stated age and is cooperative  HEENT: Head: Normocephalic, no lesions, without obvious abnormality. Eye: Normal external eye, conjunctiva, lids cornea, BECKA. Ears: Normal TM's bilaterally. Normal auditory canals and external ears. Non-tender. Nose: Normal external nose, mucus membranes and septum. Pharynx: Dental Hygiene adequate. Normal buccal mucosa. Normal pharynx. Neck: no adenopathy, no carotid bruit, no JVD, supple, symmetrical, trachea midline and thyroid not enlarged, symmetric, no tenderness/mass/nodules  Lungs: clear to auscultation bilaterally and no use of accessory muscles.   Heart: regular rate and rhythm, S1, S2 normal, no murmur, click, rub or gallop and normal apical impulse  Abdomen: soft, non-tender; bowel sounds normal; no masses,  no organomegaly  Extremities: Right arm in a sling; L arm rash + and swelling  Skin: Skin color, texture, turgor normal. No rashes or lesions  Neurologic: Mental status: Alert, oriented to self and place, easily confused     Data    LABS  CBC:   Recent Labs     04/26/19  0600 04/27/19  0646 04/28/19  0645   WBC 11.5* 11.4* 8.3   HGB 7.7* 8.0* 6.4*   HCT 23.1* 23.8* 20.6*   .7* 103.6* 109.4*    319 286     BMP:   Recent Labs     04/26/19  0600 04/27/19  0646 04/27/19  2150 04/28/19  0645    141  --  137   K 3.5 3.3* 3.4* 5.2*    102  --  104   CO2 27 25  --  25   PHOS 3.1 3.4  --  2.4*   BUN 5* 5*  --  2*   CREATININE <0.5* <0.5*  --  <0.5*     POC GLUCOSE:  No results for input(s): POCGLU in the last 72 hours. LIVER PROFILE:   Recent Labs     04/26/19  0600 04/27/19  0646 04/28/19  0645   LABALBU 1.9* 1.7* 1.5*     PT/INR: No results for input(s): PROTIME, INR in the last 72 hours. APTT: No results for input(s): APTT in the last 72 hours. UA:  No results for input(s): NITRITE, COLORU, PHUR, LABCAST, WBCUA, RBCUA, MUCUS, TRICHOMONAS, YEAST, BACTERIA, CLARITYU, SPECGRAV, LEUKOCYTESUR, UROBILINOGEN, BILIRUBINUR, BLOODU, GLUCOSEU, KETUA, AMORPHOUS in the last 72 hours. Microbiology:  Wound Culture: No results for input(s): WNDABS, ORG in the last 72 hours. Invalid input(s):  LABGRAM  Nasal Culture:   No results for input(s): ORG, MRSAPCR in the last 72 hours. Blood Culture:   No results for input(s): BC, BLOODCULT2 in the last 72 hours. Fungal Culture:   No results for input(s): FUNGSM in the last 72 hours. No results for input(s): FUNCXBLD in the last 72 hours. CSF Culture:  No results for input(s): COLORCSF, APPEARCSF, CFTUBE, CLOTCSF, WBCCSF, RBCCSF, NEUTCSF, NUMCELLSCSF, LYMPHSCSF, MONOCSF, GLUCCSF, VOLCSF in the last 72 hours.   Respiratory Culture:  No results for input(s): Silvia Rejames in the last 72 hours. AFB:No results for input(s): AFBSMEAR in the last 72 hours. Urine Culture  No results for input(s): LABURIN in the last 72 hours. RADIOLOGY:    VL Extremity Venous Left   Final Result      XR CHEST PORTABLE   Final Result   Findings concerning for worsening edema or infection on the right. CTA PULMONARY W CONTRAST   Final Result   No evidence pulmonary embolism to the segmental level. Small bilateral pleural effusions as well as multifocal bilateral airspace   disease, likely pneumonia. CT Head WO Contrast   Final Result   No acute intracranial abnormality. XR CHEST PORTABLE   Final Result   Ill-defined airspace opacity in the infrahilar region on the right. Findings   raise the question of pneumonia. CONSULTS:    IP CONSULT TO SOCIAL WORK    ASSESSMENT AND PLAN:      Active Problems:    Severe sepsis (HCC)    HAP (hospital-acquired pneumonia)    Septicemia ([address]    Cellulitis of left upper extremity    Hypokalemia    Leukemoid reaction    Moderate malnutrition ([address]  Resolved Problems:    * No resolved hospital problems. *      Admitted for Acute metabolic encephalopathy likley sec to Aspiration Pneumonia  Sepsis POA sec to pneumonia( tachycardia, inc wbc + foci of infection) - resolved   Acute hypoxic respiratory failure - Resolved; now on room air  Low nancy for GNR infection   abx changed to unasyn day 2   Received vanco and cefepime   Severe dysphagia , saline in mouth with gurgling on exam  4.27.19   Npo, gentle ivf , speech eval in am           L arm swelling   - venous doppler   Left UE picc line from prev admission   Left   Isolated acute totally occluded superficial venous thrombosis involving a   branch of the basilic vein in zone 5 only.    No other evidence of deep vein thrombosis or superficial vein thrombosis of   the left upper extremity or right internal jugular vein.        Hypokalemia  - Monitor and replace as needed     Right femoral fracture shaft and subtrochanteric  - s/p ORIF on 4/16/19   - PT/OT      Humeral fracture non displaced   - Pain management  - Continue sling      Alcohol dependence with abuse  - Not withdrawing  - Continue to monitor  Low nancy for wernickie this admission      History of seizure  - Stable; continue Phenytoin    Confusion - Possibly at baseline  - Will monitor and provide supportive care. Better off cefepime      Acute drop in h/h, got 1 prbc today, check fobt     Code Status: Full Code    PT/OT Eval Status:ordered    Discharge plan - ct care     The patient and / or the family were informed of the results of any tests, a time was given to answer questions, a plan was proposed and they agreed with plan. Jesse Gutierrez MD

## 2019-04-28 NOTE — FLOWSHEET NOTE
Pt awake and cont to be aggressive/combative. Cont to take tele leads off, kicked off blue boots and had linens on the floor. Very confused and disoriented. Will not leave sling on to R arm. New dressing was placed to R hip and pt cont to try to take dressing off. Unable to redirect or reorient. L wrist restraint on along with bed alarm. Labs drawn from PICC and sent to lab.

## 2019-04-29 LAB
ALBUMIN SERPL-MCNC: 1.6 G/DL (ref 3.4–5)
ANION GAP SERPL CALCULATED.3IONS-SCNC: 7 MMOL/L (ref 3–16)
ANISOCYTOSIS: ABNORMAL
ATYPICAL LYMPHOCYTE RELATIVE PERCENT: 1 % (ref 0–6)
BANDED NEUTROPHILS RELATIVE PERCENT: 2 % (ref 0–7)
BASOPHILS ABSOLUTE: 0.1 K/UL (ref 0–0.2)
BASOPHILS RELATIVE PERCENT: 1 %
BUN BLDV-MCNC: <2 MG/DL (ref 7–20)
CALCIUM SERPL-MCNC: 7.2 MG/DL (ref 8.3–10.6)
CHLORIDE BLD-SCNC: 102 MMOL/L (ref 99–110)
CO2: 27 MMOL/L (ref 21–32)
CREAT SERPL-MCNC: <0.5 MG/DL (ref 0.8–1.3)
EOSINOPHILS ABSOLUTE: 0.4 K/UL (ref 0–0.6)
EOSINOPHILS RELATIVE PERCENT: 4 %
FERRITIN: 592.5 NG/ML (ref 30–400)
GFR AFRICAN AMERICAN: >60
GFR NON-AFRICAN AMERICAN: >60
GLUCOSE BLD-MCNC: 350 MG/DL (ref 70–99)
HCT VFR BLD CALC: 25.7 % (ref 40.5–52.5)
HEMOGLOBIN: 8.7 G/DL (ref 13.5–17.5)
LYMPHOCYTES ABSOLUTE: 1.6 K/UL (ref 1–5.1)
LYMPHOCYTES RELATIVE PERCENT: 16 %
MACROCYTES: ABNORMAL
MAGNESIUM: 1.8 MG/DL (ref 1.8–2.4)
MCH RBC QN AUTO: 34.1 PG (ref 26–34)
MCHC RBC AUTO-ENTMCNC: 34.1 G/DL (ref 31–36)
MCV RBC AUTO: 100.1 FL (ref 80–100)
MONOCYTES ABSOLUTE: 1 K/UL (ref 0–1.3)
MONOCYTES RELATIVE PERCENT: 11 %
NEUTROPHILS ABSOLUTE: 6.2 K/UL (ref 1.7–7.7)
NEUTROPHILS RELATIVE PERCENT: 65 %
PDW BLD-RTO: 15.8 % (ref 12.4–15.4)
PHOSPHORUS: 2.6 MG/DL (ref 2.5–4.9)
PLATELET # BLD: 283 K/UL (ref 135–450)
PLATELET SLIDE REVIEW: ADEQUATE
PMV BLD AUTO: 9.8 FL (ref 5–10.5)
POTASSIUM SERPL-SCNC: 4.8 MMOL/L (ref 3.5–5.1)
PROCALCITONIN: 0.29 NG/ML (ref 0–0.15)
RBC # BLD: 2.56 M/UL (ref 4.2–5.9)
SLIDE REVIEW: ABNORMAL
SODIUM BLD-SCNC: 136 MMOL/L (ref 136–145)
WBC # BLD: 9.3 K/UL (ref 4–11)

## 2019-04-29 PROCEDURE — 2500000003 HC RX 250 WO HCPCS: Performed by: INTERNAL MEDICINE

## 2019-04-29 PROCEDURE — 84145 PROCALCITONIN (PCT): CPT

## 2019-04-29 PROCEDURE — 92526 ORAL FUNCTION THERAPY: CPT

## 2019-04-29 PROCEDURE — 6370000000 HC RX 637 (ALT 250 FOR IP): Performed by: NURSE PRACTITIONER

## 2019-04-29 PROCEDURE — 2580000003 HC RX 258: Performed by: INTERNAL MEDICINE

## 2019-04-29 PROCEDURE — 82728 ASSAY OF FERRITIN: CPT

## 2019-04-29 PROCEDURE — 97116 GAIT TRAINING THERAPY: CPT

## 2019-04-29 PROCEDURE — 94760 N-INVAS EAR/PLS OXIMETRY 1: CPT

## 2019-04-29 PROCEDURE — 6370000000 HC RX 637 (ALT 250 FOR IP): Performed by: INTERNAL MEDICINE

## 2019-04-29 PROCEDURE — 6360000002 HC RX W HCPCS: Performed by: INTERNAL MEDICINE

## 2019-04-29 PROCEDURE — 97530 THERAPEUTIC ACTIVITIES: CPT

## 2019-04-29 PROCEDURE — 85025 COMPLETE CBC W/AUTO DIFF WBC: CPT

## 2019-04-29 PROCEDURE — 83735 ASSAY OF MAGNESIUM: CPT

## 2019-04-29 PROCEDURE — 1200000000 HC SEMI PRIVATE

## 2019-04-29 PROCEDURE — 99232 SBSQ HOSP IP/OBS MODERATE 35: CPT | Performed by: INTERNAL MEDICINE

## 2019-04-29 PROCEDURE — 36415 COLL VENOUS BLD VENIPUNCTURE: CPT

## 2019-04-29 PROCEDURE — 80069 RENAL FUNCTION PANEL: CPT

## 2019-04-29 PROCEDURE — 97127 HC SP THER IVNTJ W/FOCUS COG FUNCJ: CPT

## 2019-04-29 RX ADMIN — THERA TABS 1 TABLET: TAB at 09:24

## 2019-04-29 RX ADMIN — POTASSIUM CHLORIDE, DEXTROSE MONOHYDRATE AND SODIUM CHLORIDE: 150; 5; 450 INJECTION, SOLUTION INTRAVENOUS at 13:57

## 2019-04-29 RX ADMIN — FOLIC ACID 1 MG: 1 TABLET ORAL at 09:24

## 2019-04-29 RX ADMIN — AMPICILLIN AND SULBACTAM 3 G: 1; 2 INJECTION, POWDER, FOR SOLUTION INTRAMUSCULAR; INTRAVENOUS at 13:23

## 2019-04-29 RX ADMIN — POTASSIUM CHLORIDE, DEXTROSE MONOHYDRATE AND SODIUM CHLORIDE: 150; 5; 450 INJECTION, SOLUTION INTRAVENOUS at 02:44

## 2019-04-29 RX ADMIN — Medication 10 ML: at 21:22

## 2019-04-29 RX ADMIN — PHENYTOIN SODIUM 100 MG: 50 INJECTION INTRAMUSCULAR; INTRAVENOUS at 05:25

## 2019-04-29 RX ADMIN — Medication 10 ML: at 21:25

## 2019-04-29 RX ADMIN — AMPICILLIN AND SULBACTAM 3 G: 1; 2 INJECTION, POWDER, FOR SOLUTION INTRAMUSCULAR; INTRAVENOUS at 19:05

## 2019-04-29 RX ADMIN — PHENYTOIN SODIUM 100 MG: 50 INJECTION INTRAMUSCULAR; INTRAVENOUS at 13:57

## 2019-04-29 RX ADMIN — AMPICILLIN AND SULBACTAM 3 G: 1; 2 INJECTION, POWDER, FOR SOLUTION INTRAMUSCULAR; INTRAVENOUS at 05:25

## 2019-04-29 RX ADMIN — PHENYTOIN SODIUM 100 MG: 50 INJECTION INTRAMUSCULAR; INTRAVENOUS at 21:22

## 2019-04-29 RX ADMIN — Medication 100 MG: at 09:24

## 2019-04-29 RX ADMIN — ENOXAPARIN SODIUM 40 MG: 40 INJECTION SUBCUTANEOUS at 09:24

## 2019-04-29 ASSESSMENT — PAIN SCALES - WONG BAKER
WONGBAKER_NUMERICALRESPONSE: 0

## 2019-04-29 ASSESSMENT — PAIN DESCRIPTION - PROGRESSION: CLINICAL_PROGRESSION: NOT CHANGED

## 2019-04-29 ASSESSMENT — PAIN SCALES - GENERAL: PAINLEVEL_OUTOF10: 0

## 2019-04-29 NOTE — DISCHARGE INSTR - COC
Continuity of Care Form    Patient Name: Susan Biswas   :  1955  MRN:  [id number]    Admit date:  2019  Discharge date:  19    Code Status Order: Limited   Advance Directives:   [facility] Documentation     Date/Time Healthcare Directive Type of Healthcare Directive Copy in [facility] [address] Agent's Name Healthcare Agent's Phone Number    [phone]  No, patient does not have an advance directive for healthcare treatment -- -- -- -- --          Admitting Physician:  Ulysses Cannon MD  PCP: Hermann Agarwal MD    Discharging Nurse: [facility] Unit/Room#: [id number]-0634/7707-49  Discharging Unit Phone Number: [phone]    Emergency Contact:   Extended Emergency Contact Information  Primary Emergency Contact: Royce yanez Phone: [phone]  Mobile Phone: [phone]  Relation: Brother/Sister   needed? No  Secondary Emergency Contact: Ciera Martino [address] Phone: [phone]  Relation: Brother/Sister    Past Surgical History:  Past Surgical History:   Procedure Laterality Date    FEMUR FRACTURE SURGERY Right 2019    INTRAMEDULLARY NAILING RIGHT FEMUR performed by Meron Tafoya MD at [address]         Immunization History: There is no immunization history on file for this patient. Active Problems:  Patient Active Problem List   Diagnosis Code    Fall W19. XXXA    Right humeral fracture S42.301A    Right femoral fracture (HCC) S72.91XA    Alcohol intoxication ([address] F10.929    Hypomagnesemia E83.42    Epilepsia ([address] G40.909    Closed fracture of neck of right femur (HCC) S72.001A    Alcohol dependence ([facility]) F10.20    Multiple rib fractures S22.49XA    Syncope R55    Shortness of breath R06.02    Bronchitis J40    Tobacco abuse Z72.0    ETOH abuse F10.10    Severe sepsis ([facility]) A41.9, R65.20    HAP (hospital-acquired pneumonia) J18.9    Septicemia ([address] A41.9    Cellulitis of left upper extremity L03.114    Hypokalemia E87.6    Leukemoid reaction D72.823    Moderate malnutrition (HCC) E44.0       Isolation/Infection:   Isolation          No Isolation            Nurse Assessment:  Last Vital Signs: /77   Pulse 108   Temp 98.9 °F (37.2 °C) (Oral)   Resp 18   Ht 5' 9\" (1.753 m)   Wt 172 lb 9.9 oz (78.3 kg)   SpO2 90%   BMI 25.49 kg/m²     Last documented pain score (0-10 scale): Pain Level: 0  Last Weight:   Wt Readings from Last 1 Encounters:   04/29/19 172 lb 9.9 oz (78.3 kg)     Mental Status:  oriented and alert    IV Access:  - None    Nursing Mobility/ADLs:  Walking   Assisted  Transfer  Assisted  Bathing  Assisted  Dressing  Assisted  Toileting  Assisted  Feeding  Assisted  Med Admin  Assisted  Med Delivery   crushed and prefers mixed with applesauce    Wound Care Documentation and Therapy:Keep steri strips on hip until they fall off on their own, Keep hip wounds clean and dry    Wound 04/23/19 Heel Right (Active)   Wound Deep tissue/Injury 4/27/2019  9:42 PM   Dressing Status Other (Comment) 4/29/2019 10:01 AM   Dressing Changed Changed/New 4/24/2019 10:01 AM   Dressing/Treatment Barrier Film 4/29/2019 10:01 AM   Wound Cleansed Not Cleansed 4/27/2019  9:42 PM   Dressing Change Due 04/24/19 4/24/2019 10:01 AM   Wound Length (cm) 4.5 cm 4/24/2019 10:01 AM   Wound Width (cm) 5 cm 4/24/2019 10:01 AM   Wound Depth (cm) 0 cm 4/24/2019 10:01 AM   Wound Surface Area (cm^2) 22.5 cm^2 4/24/2019 10:01 AM   Wound Volume (cm^3) 0 cm^3 4/24/2019 10:01 AM   Wound Assessment Dry; Intact; Light purple;Red 4/29/2019 10:01 AM   Drainage Amount None 4/29/2019 10:01 AM   Trinity-wound Assessment Dry; Intact; Non-blanchable erythema 4/29/2019 10:01 AM   Red%Wound Bed 50 4/24/2019 10:01 AM   Purple%Wound Bed 50 4/24/2019 10:01 AM   Number of days: 6        Elimination:  Continence:   · Bowel: No  · Bladder: No  Urinary Catheter: Removal Date 5/7/19   Colostomy/Ileostomy/Ileal Conduit: No       Date of Last BM: 5/6/19    Intake/Output Summary (Last 24 hours) at 4/29/2019 1612  Last data filed at 4/29/2019 1330  Gross per 24 hour   Intake 1259.17 ml   Output 1125 ml   Net 134.17 ml     I/O last 3 completed shifts: In: 1259.2 [I.V.:967.5; Blood:291.7]  Out: 1125 [Urine:1125]    Safety Concerns:     History of Falls (last 30 days) and At Risk for Falls    Impairments/Disabilities:      None    Nutrition Therapy:  Current Nutrition Therapy:   - Oral Diet:  Dysphagia 1 pureed    Routes of Feeding: Oral  Liquids: Nectar Thick Liquids  Daily Fluid Restriction: no  Last Modified Barium Swallow with Video (Video Swallowing Test): done on 4/30    Treatments at the Time of Hospital Discharge:   Respiratory Treatments: yes  Oxygen Therapy:  is not on home oxygen therapy.   Ventilator:    - No ventilator support    Rehab Therapies: Physical Therapy, Occupational Therapy and nursing  Weight Bearing Status/Restrictions: 50% WB right leg and NWB right arm in sling  Other Medical Equipment (for information only, NOT a DME order):  stedy  Other Treatments: no    Patient's personal belongings (please select all that are sent with patient):  None    RN SIGNATURE:  Electronically signed by Elizabeth Torres RN on 5/7/19 at 1:26 PM      CASE MANAGEMENT/SOCIAL WORK SECTION    Inpatient Status Date: 4/23/19    Readmission Risk Assessment Score:  Readmission Risk              Risk of Unplanned Readmission:        24           Discharging to Facility/ Agency   · Name: [facility]  · [place] [place] [address]  · Phone: [phone]  · Fax: [phone]    / signature: Electronically signed by CARLY Alvarenga on 4/29/19 at 4:13 PM        PHYSICIAN SECTION    Prognosis: Fair    Condition at Discharge: Stable    Rehab Potential (if transferring to Rehab): Good    Recommended Labs or Other Treatments After Discharge: PT OT    Physician Certification: I certify the above information and transfer of Quinn Wilson  is necessary for the continuing treatment of the diagnosis listed and that he requires [facility] for less 30 days. Update Admission H&P: No change in H&P    PHYSICIAN SIGNATURE:  Electronically signed by Benji Lagos MD on 5/7/19 at 10:33 AM       Followup Dr Malik Leon in 3-4 weeks in office.    [facility], [address] [place] [address], [address] [place] [address] [address], [phone]

## 2019-04-29 NOTE — PROGRESS NOTES
Pulmonary Progress Note    Date of Admission: 4/22/2019   LOS: 6 days       CC:  ams    Subjective:  Baseline confusion. Stopped seroquel. ROS:    No nausea     No Vomiting   No chest pain        PHYSICAL EXAM:    Blood pressure 104/63, pulse 98, temperature 98.4 °F (36.9 °C), resp. rate 20, height 5' 9\" (1.753 m), weight 172 lb 9.9 oz (78.3 kg), SpO2 90 %.'  Gen: No distress. ENT:   Resp: No accessory muscle use. No crackles. No wheezes. no rhonchi. Has been NPO  CV: Regular rate. Regular rhythm. No murmur or rub. No edema. Skin: Warm, dry, normal texture and turgor. No nodule on exposed extremities. M/S: No cyanosis. No clubbing. No joint deformity. Psych:  Awake. Alert     Medications:    Scheduled Meds:   ampicillin-sulbactam  3 g Intravenous Q6H    thiamine  100 mg Oral Daily    phenytoin  100 mg Intravenous Q8H    nicotine  1 patch Transdermal Q24H    sodium chloride flush  10 mL Intravenous 2 times per day    sodium chloride flush  10 mL Intravenous 2 times per day    enoxaparin  40 mg Subcutaneous Daily    multivitamin  1 tablet Oral Daily    folic acid  1 mg Oral Daily       Continuous Infusions:   dextrose 5% and 0.45% NaCl with KCl 20 mEq 75 mL/hr at 04/29/19 0244       PRN Meds:  sodium chloride flush, potassium chloride, sodium chloride flush, magnesium sulfate, haloperidol lactate    Labs reviewed:  CBC:   Recent Labs     04/27/19  0646 04/28/19  0645 04/29/19  0520   WBC 11.4* 8.3 9.3   HGB 8.0* 6.4* 8.7*   HCT 23.8* 20.6* 25.7*   .6* 109.4* 100.1*    286 283     BMP:   Recent Labs     04/27/19  0646 04/27/19  2150 04/28/19  0645 04/29/19  0520     --  137 136   K 3.3* 3.4* 5.2* 4.8     --  104 102   CO2 25  --  25 27   PHOS 3.4  --  2.4* 2.6   BUN 5*  --  2* <2*   CREATININE <0.5*  --  <0.5* <0.5*     LIVER PROFILE:   No results for input(s): AST, ALT, LIPASE, BILIDIR, BILITOT, ALKPHOS in the last 72 hours. Invalid input(s):   AMYLASE, ALB  PT/INR: No results for input(s): PROTIME, INR in the last 72 hours. APTT: No results for input(s): APTT in the last 72 hours. UA:  No results for input(s): NITRITE, COLORU, PHUR, LABCAST, WBCUA, RBCUA, MUCUS, TRICHOMONAS, YEAST, BACTERIA, CLARITYU, SPECGRAV, LEUKOCYTESUR, UROBILINOGEN, BILIRUBINUR, BLOODU, GLUCOSEU, AMORPHOUS in the last 72 hours. Invalid input(s): Ana Goode  No results for input(s): PH, PCO2, PO2 in the last 72 hours. Cx:      Films:       Assessment:     Healthcare associated pneumonia, possible MRSA and gram negative pneumonia   Hx EtOH abuse  altered mental status  Cellulitis  Acute hypoxemia, saturation < 90% on room air   Bilateral pleural effusion   Anemia  Rib fracture, left status post fall. SVT in left upper ext. Plan:      hcap  -  Cefepime x 5 days. -   flagyl x 5 days   - sputum and blood culture ordere   - rhonchi decreased after NPO. SLP seen today. Cleared for diet. D/w Dona Lazo about palliative feeding. See note about discussed with Dona Lazo. AMS       - This may be baseline   - believes at [facility]. Having visual hallucinations. - likley Wernicke's. Stopped seroquil per brothers request.      Anemia  - Macrocytic.  secondary to etoh?  - monitor with MVI  - yesterday appear to be lab error.   Hem and mcv better without etoh and MvI       Jud Mckeon [facility]  [phone]

## 2019-04-29 NOTE — CONSULTS
[facility]  Palliative Care   Consult Note    NAME:  Terry Cevallos RECORD NUMBER:  [id number]  AGE: 70 y.o. GENDER: male  : 1955  TODAY'S DATE:  2019    Subjective     Reason for Consult:  goals of care and code status   Visit Type: Initial Consult      Bobbi Kimball is a 70 y.o. male referred by:  [x] Physician    PAST MEDICAL HISTORY      Diagnosis Date    BPH (benign prostatic hyperplasia)     Seizures ([address]        PAST SURGICAL HISTORY  Past Surgical History:   Procedure Laterality Date    FEMUR FRACTURE SURGERY Right 2019    INTRAMEDULLARY NAILING RIGHT FEMUR performed by Gloria Canchola MD at [place] [address]  History reviewed. No pertinent family history. SOCIAL HISTORY  Social History     Tobacco Use    Smoking status: Current Every Day Smoker     Packs/day: 1.50     Types: Cigarettes    Smokeless tobacco: Never Used   Substance Use Topics    Alcohol use: Yes     Alcohol/week: 3.6 oz     Types: 6 Cans of beer per week     Comment: Pt states \"3 bottles of beer daily. \"     Drug use: Not Currently       ALLERGIES  No Known Allergies    MEDICATIONS  No current facility-administered medications on file prior to encounter.       Current Outpatient Medications on File Prior to Encounter   Medication Sig Dispense Refill    ipratropium-albuterol (DUONEB) 0.5-2.5 (3) MG/3ML SOLN nebulizer solution Inhale 3 mLs into the lungs every 4 hours (while awake) 759 mL 0    folic acid (FOLVITE) 1 MG tablet Take 1 tablet by mouth daily 30 tablet 3    docusate sodium (COLACE, DULCOLAX) 100 MG CAPS Take 100 mg by mouth 2 times daily 30 capsule 0    senna (SENOKOT) 8.6 MG tablet Take 1 tablet by mouth nightly 30 tablet 0    cyclobenzaprine (FLEXERIL) 10 MG tablet Take 1 tablet by mouth 3 times daily as needed for Muscle spasms 30 tablet 0    vitamin B-1 100 MG tablet Take 1 tablet by mouth daily 30 tablet 3    [START ON 5/2/2019] aspirin 325 MG EC tablet Take 1 tablet by mouth daily for 14 days Begin after Lovenox dosing completed for DVT prophylaxis 14 tablet 0    enoxaparin (LOVENOX) 40 MG/0.4ML injection Inject 0.4 mLs into the skin nightly for 14 days 13 Syringe 0    doxazosin (CARDURA) 4 MG tablet Take 4 mg by mouth nightly      phenytoin (DILANTIN) 100 MG ER capsule Take 200 mg by mouth 2 times daily       famotidine (PEPCID) 20 MG tablet Take 1 tablet by mouth 2 times daily 20 tablet 0       Objective         BP (!) 142/72   Pulse 99   Temp 98.8 °F (37.1 °C) (Oral)   Resp 18   Ht 5' 9\" (1.753 m)   Wt 172 lb 9.9 oz (78.3 kg)   SpO2 90%   BMI 25.49 kg/m²     Code Status: Limited     Advanced Directives: not completed, will complete tomorrow      Assessment        Management and Education    Persons available for education:       [x] Self     [x] Caregiver       [] Spouse       [] Other Family Member   []  Other    Spiritual History:  notified: Yes,     Does the patient have a Primary Care Physician? Yes  Level of patient/caregiver understanding able to:     [x] Verbalize Understanding   [] Demonstrate Understanding       [] Teach Back       [] Needs Reinforcement     []  Other:      Teaching Time:  1hours  0 minutes     Plan        Social Service Consult Made:  Yes  Assistance filling out Living Will/HPOA:  No      Discharge Plan:  Providing support for coping/adaptation/distress of family  Providing support for coping/adaptation/distress of patient  Code status clarified: [facility]  Palliative care orders introduced    Discharge Environment:  [] Hospice Consult Agency:  [] Inpatient Hospice    [] Home with Hospice Care   [] ECF with Hospice  [] ECF skilled care with Hospice to follow   [] Other:    Discussion: Patient up in chair alert and oriented to person, place, time and situation. His sister in law Osorio Isaacs is present and Betty Ware is ok with having discussion with her present.   He does give a history of

## 2019-04-29 NOTE — PROGRESS NOTES
Therapies:  · Oral Diet Orders: Dysphagia 2, Honey Thick   · Oral Diet intake: 0%, 1-25%  · Oral Nutrition Supplement (ONS) Orders: None  · ONS intake: NPO  · Anthropometric Measures:  · Ht: 5' 9\" (175.3 cm)   · Current Body Wt: 172 lb (78 kg)  · Admission Body Wt: 162 lb (73.5 kg)  · % Weight Change:   8.7% loss with wt on 4/14/19 at 182 # 12.2 oz.  Loss likely a combination of fluid shifts & decreased po  · Ideal Body Wt: 160 lb (72.6 kg)   · BMI Classification: BMI 18.5 - 24.9 Normal Weight    Nutrition Interventions:   Continue current diet, Start ONS  Continued Inpatient Monitoring    Nutrition Evaluation:   · Evaluation: No progress toward goals   · Goals: tolerate most appropriate form of nutrition    · Monitoring: Meal Intake, Supplement Intake, Diet Tolerance, Weight, Pertinent Labs, Skin Integrity, Mental Status/Confusion, Constipation      Electronically signed by Kenny Moran RD, LD on 4/29/19 at 12:19 PM    Contact Number: [phone]

## 2019-04-29 NOTE — PLAN OF CARE
Problem: Nutrition  Goal: Optimal nutrition therapy  Outcome: Ongoing   Nutrition Problem: Inadequate oral intake  Intervention: Food and/or Nutrient Delivery: Continue current diet, Start ONS  Nutritional Goals: tolerate most appropriate form of nutrition

## 2019-04-29 NOTE — PROGRESS NOTES
Progress Note  Admit Date: 4/22/2019      PCP: Scottie Ortega MD     CC: F/U for pneumonia    SUBJECTIVE / Interval History: Pt S/E. Pt still with coughing. He is sleepy today. Allergies  Patient has no known allergies. Medications    Scheduled Meds:   ampicillin-sulbactam  3 g Intravenous Q6H    thiamine  100 mg Oral Daily    phenytoin  100 mg Intravenous Q8H    nicotine  1 patch Transdermal Q24H    sodium chloride flush  10 mL Intravenous 2 times per day    sodium chloride flush  10 mL Intravenous 2 times per day    enoxaparin  40 mg Subcutaneous Daily    multivitamin  1 tablet Oral Daily    folic acid  1 mg Oral Daily     Continuous Infusions:   dextrose 5% and 0.45% NaCl with KCl 20 mEq 75 mL/hr at 04/29/19 0244       PRN Meds:  sodium chloride flush, potassium chloride, sodium chloride flush, magnesium sulfate, haloperidol lactate    Vitals    Vitals: /63   Pulse 98   Temp 98.4 °F (36.9 °C)   Resp 20   Ht 5' 9\" (1.753 m)   Wt 172 lb 9.9 oz (78.3 kg)   SpO2 90%   BMI 25.49 kg/m²   General appearance: WD/WN 70y.o. year-old  male NAD  HEENT: Head: Normocephalic, no lesions, without obvious abnormality. Eye: Normal external eye, conjunctiva, lids cornea, BECKA. Ears: Normal TM's bilaterally. Normal auditory canals and external ears. Non-tender. Nose: Normal external nose, mucus membranes and septum. Pharynx: Dental Hygiene adequate. Normal buccal mucosa. Normal pharynx. Neck: no carotid bruit, no JVD, supple, symmetrical  Lungs: clear to auscultation bilaterally and no use of accessory muscles.   Heart: regular rate and rhythm, S1, S2 normal, no murmur, click, rub or gallop  Abdomen: soft, non-tender; bowel sounds normal; no masses,  no organomegaly  Extremities: Right arm in a sling; L arm rash + and swelling  Skin: Skin color, texture, turgor normal. No rashes or lesions  Neurologic: sleepy, easily awakened, confused     Data    LABS  CBC:   Recent Labs dependence with abuse  - Not withdrawing  - Continue to monitor  Low nancy for wernickie this admission      History of seizure  - Stable; continue Phenytoin    Confusion - Possibly at baseline  - Will monitor and provide supportive care   Better off cefepime     Code Status: DNR-CC    PT/OT Eval Status:ordered    Discharge plan - ct care     The patient and / or the family were informed of the results of any tests, a time was given to answer questions, a plan was proposed and they agreed with plan.       Namita Olea, DO

## 2019-04-29 NOTE — PROGRESS NOTES
Occupational Therapy  Facility/Department: [address] MED SURG  Daily Treatment Note  Let this serve as discharge note if patient is discharged prior to next OT session  NAME: Micaela Hatch  : 1955  MRN: [id number]    Date of Service: 2019    Discharge Recommendations:  3-5 sessions per week    AM-PAC Score  Micaela Hatch scored a 7/24 on the AM-PAC ADL Inpatient form. Current research shows that an AM-PAC score of 17 or less is typically not associated with a discharge to the patient's home setting. Based on the patients AM-PAC score and their current ADL deficits, it is recommended that the patient have 3-5 sessions per week of Occupational Therapy at d/c to increase the patients independence. Assessment   Performance deficits / Impairments: Decreased functional mobility ; Decreased high-level IADLs;Decreased ADL status; Decreased endurance;Decreased strength;Decreased safe awareness;Decreased cognition  Assessment: Patient is limited by cognition, NWB on right UE, and 50% WB on right LE. Patient is currently dependent for ADL tasks and transfers requiring the use of lift equipment and 2 people. Cont per POC. Treatment Diagnosis: impaired func mob, transfers, and ADL status   Prognosis: Fair  Patient Education: role of therapy, orientation   REQUIRES OT FOLLOW UP: Yes  Activity Tolerance: Patient Tolerated treatment well;Treatment limited secondary to decreased cognition;Patient limited by pain  Safety Devices in place: Yes  Type of devices: Call light within reach; Chair alarm in place; Left in chair;Gait belt;Nurse notified(TERRIE Steve aware )         Patient Diagnosis(es): The primary encounter diagnosis was Septicemia ([facility] [address] Diagnoses of Leukemoid reaction, Hypokalemia, Hypomagnesemia, Cellulitis of left upper extremity, and HAP (hospital-acquired pneumonia) were also pertinent to this visit. has a past medical history of BPH (benign prostatic hyperplasia) and Seizures ([address]    has a past surgical history that includes Femur fracture surgery (Right, 4/16/2019) and Skull fracture elevation. Restrictions  Restrictions/Precautions: Fall Risk  Other position/activity restrictions: R UE NWB immob with Sling (no ROM Exs), R LE 50% Wgt Bear. H/O ETOH. Diet : Dysphagia I, Pureed, Pudding Thick. Subjective   Chart Reviewed: Yes  Patient assessed for rehabilitation services?: Yes  Additional Pertinent Hx: HPI initial admission Per Tod Petersen MD 4/14/19: Pt is \"77 y.o. male who presented to [facility] after he had a mechanical fall, admitted to have right hip pain, 10 out of 10 intensity, nonradiating, sharp, worse with any movement, to note that patient is poor historian and he was intoxicated on presentation. Found to have right femoral and right humeral fracture along was 2 broken ribs. Hold on the right side, patient denies nausea, vomiting, abdominal pain or chest pain when examined. Denies any hallucination at this time, drinks on a daily basis. \" 4/23 admitted from [address] [place] [address] w/ [address] [place] [address], HCAP ,encephalopathy,cellulitis  Response to previous treatment: Patient with no complaints from previous session  Family / Caregiver Present: No  Referring Practitioner: Olga Carrasquillo  Diagnosis: 4/23 admitted from [address] [place] [address] w/ septicemia, HCAP w/aspiration ,encephalopathy,cellulitis, s/p ORIF of R femur fx (50% WB) and conservative tx of R humeral fx (NWB) sling and swath  Subjective: Patient met b/s as co-treat with PT, patient pleasantly confused, does not resist therapy despite stating he wasn't going to get up today, reports pain in right LE with standing, but doesn't rate     Orientation  Overall Orientation Status: Impaired  Orientation Level: Oriented to person;Oriented to place; Disoriented to time;Disoriented to situation    Objective    ADL  UE Dressing: (assist needed to don gown and sling properly )  LE Dressing: Dependent/Total(don socks )  Additional Comments: assist/cues for managing secretions with chema suction, patient \"gurgling\" secretions throughout session     Balance  Sitting Balance: Stand by assistance  Standing Balance: Contact guard assistance(in silvia brink )    Transfers  Sit to stand: (Min A x2 from elevated EOB in silvia brink )  Stand to sit: (Min A x 2 to recliner )    Functional Mobility  Assist Level: Dependent/Total  Functional Mobility Comments: silvia brink bed-recliner, patient will be limited with fxl mobility due to NWB on right UE and 50% WB on right LE and patient has decreased understanding of these precautions     Bed mobility  Rolling to Left: Unable to assess  Rolling to Right: Unable to assess  Supine to Sit: Stand by assistance(HOB up)  Sit to Supine: Unable to assess  Scooting: Stand by assistance(pt hooking R leg to scoot to edge)    Cognition  Overall Cognitive Status: Exceptions  Arousal/Alertness: Delayed responses to stimuli  Following Commands: Follows one step commands with increased time; Follows one step commands with repetition  Attention Span: Attends with cues to redirect  Memory: Decreased recall of precautions;Decreased recall of recent events;Decreased short term memory  Safety Judgement: Decreased awareness of need for assistance;Decreased awareness of need for safety  Problem Solving: Assistance required to identify errors made;Assistance required to generate solutions;Assistance required to implement solutions  Insights: Not aware of deficits  Initiation: Requires cues for some  Sequencing: Requires cues for some     Plan   Times per week: 3-5  Times per day: Daily  Current Treatment Recommendations: Strengthening, Functional Mobility Training, Endurance Training, Balance Training, Safety Education & Training, Equipment Evaluation, Education, & procurement, Patient/Caregiver Education & Training, Self-Care / ADL, Positioning, ROM    Goals  Short term goals  Time Frame for Short term goals: prior to d/c: status as of 4/29/19: goals ongoing   Short term goal 1: Pt will complete sit <> stand transfer with mod A  Short term goal 2: Pt will feed self w/ Supervision after set up when swallow improves  Short term goal 3: Pt will complete dressing /bathing with modA  Short term goal 4: Pt will complete toileting with max A  Short term goal 5: Pt will complete grooming with setup   Patient Goals   Patient goals : Pt did not formulate goal at this time ,is aware need for ongoing therapy       Therapy Time   Individual Concurrent Group Co-treatment   Time In       1137   Time Out       1205   Minutes       Professor Br[address] [place], [address] [address]

## 2019-04-29 NOTE — PROGRESS NOTES
Speech Language Pathology  [facility]   Speech Therapy  Daily Dysphagia Treatment Note    Darron Lewis  AGE: 70 y.o. GENDER: male  : 1955  [id number]  EPISODE DATE:  2019    Patient Active Problem List   Diagnosis    Fall    Right humeral fracture    Right femoral fracture (HCC)    Alcohol intoxication ([address]    Hypomagnesemia    Epilepsia ([address]    Closed fracture of neck of right femur ([address]    Alcohol dependence ([address]    Multiple rib fractures    Syncope    Shortness of breath    Bronchitis    Tobacco abuse    ETOH abuse    Severe sepsis (HCC)    HAP (hospital-acquired pneumonia)    Septicemia (HCC)    Cellulitis of left upper extremity    Hypokalemia    Leukemoid reaction    Moderate malnutrition (HCC)     No Known Allergies    Treatment Diagnosis: Dysphagia     Remote Chart Review:  4/15/2019 CXR:  Impression   Unchanged left-sided rib fractures.  No underlying pneumothorax or pleural   effusion.       Suspect lower right-sided rib fractures, age indeterminate.          2019 MBS: Moderate oral stage dysphagia characterized by decreased mastication and decreased lingual manipulation which are both compounded by lethargy.  Prolonged mastication with solids with concern for excess labor with regular solids which were not administered d/t concern for excess labor.  Prolonged, disorganized bolus formation and movement with all trials with reduced bolus cohesion and control and premature bolus loss to the pharynx.  Oral residue with all trials is minimized with cued repeat swallow.  Moderate pharyngeal stage dysphagia characterized by delayed swallow, decreased laryngeal elevation, decreased pharyngeal peristalsis.  Premature spillage to the valleculae with overflow to the hypopharynx with all trials.  Vallecular and pyriform residue with all trials is minimized with cued repeat swallow.  Flash penetration with thin.  Suspect significantly improved swallow function as mental status improves. 4/21/2019 Hospitalist documented hospital course:  70 y. o. male with a past medical history of alcohol dependence and withdrawal seizures  who presented to [facility] he had a mechanical fall. Patient was intoxicated on admission. He was admitted with a right femoral shaft fracture along with humerus fracture and rib fracture. Patient underwent ORIF on 4/16/2019. Hospital course was complicated by delirium tremens and acute blood loss anemia along with bronchitis and possible aspiration pneumonia. Chart Review:   04/22/2019: Re-admitted to hospital with AMS. Patient was just discharged today. Per SNF patient has been confused and found to have redness to his left arm. Significant cough productive of sputum as well. Patient recently discharged for ORIF after femoral fracture. Started on abx for bronchitis. Hx ETOH abuse with confusion. Currently confused. No other associated symptoms. 4/24/2019 BSE completed. Puree/pudding rec  4/25/2019 puree/honey rec  4/28/2019 Re-eval orders for swallowing received and patient downgraded to NPO    Subjective:     Current Diet : NPO  Current Liquid Diet : NPO    Comments regarding tolerating Current Diet:   4/28/2019 pulmonology:  He is likely aspirating food / spit and with his mental status, we likely cannot change this. Would not rec a peg secondary to confusion  4/28/2019 Hospitalist:  Severe dysphagia , saline in mouth with gurgling on exam  4.27.19     Objective:     Pain   Patient Currently in Pain: Denies    Cognitive/Behavior   Behavior/Cognition: Alert with eyes open entire session, Cooperative, Confused, Requires cueing, Verbally responsive with minimal delay    Presentations   Consistencies Administered: Puree, Honey, Nectar, Thin, Mech soft    Positioning   Upright in bed    PO Trials:  Patient met at bedside with intermittent wet cough and apparent difficulty managing own secretions.   · Puree: prolonged oral transit, decrease lingual control, suspected premature loss, swallow delay, decreased laryngeal elevation. No immediate overt s/s of penetration/aspiration. · Honey Thick: prolonged oral transit, decrease lingual control, suspected premature loss, swallow delay, decreased laryngeal elevation. No immediate overt s/s of penetration/aspiration. · Nectar Thick: prolonged oral transit, decrease lingual control, suspected premature loss, swallow delay, decreased laryngeal elevation. No immediate overt s/s of penetration/aspiration, but concern for hard, audible swallow as potential indicator of increased risk for penetration/aspiration. · Thin: prolonged oral transit, decrease lingual control, suspected premature loss, swallow delay, decreased laryngeal elevation. Immediate throat clear  Delayed wet coughing with all PO trials appeared comparable to cough present prior to PO trial administration. Modified Barium swallow can be completed if MD wishes for repeat instrumental assessment. Dysphagia Tx:   · PO trials: if PO diet is desired, mech soft with honey appears most optimal at this time. MBS can be completed if MD wishes for repeat instrumental assessment  · Comp strats:  dependent  · Dysphagia ex: patient unable to sustain attention to task for dysphagia ex this date    Goals: The patient will tolerate recommended diet without observed clinical signs of aspiration, ongoing  The patient/caregiver will demonstrate understanding of compensatory strategies for improved swallowing safety. , ongoing, pt needs max cues, impulsive, poor safety awareness  The patient will improve oral motor function via therapeutic oral motor exercises to 5/5 each trial., not addressed this date  The patient will tolerate honey-thick liquids without signs and symptoms of aspiration 10/10 via cup. ongoing    Assessment:   Impressions:   Dysphagia Diagnosis: Moderate oral stage dysphagia, Moderate to severe pharyngeal stage dysphagia · Patient was cooperative yet confused for direct dysphagia tx this session. · If PO diet is desired, mech soft with honey appears most optimal at this time. MBS can be completed if MD wishes for repeat instrumental assessment  · Discussed current recommendations with RN. Diet Recommendations: Only feed when awake/alert and accepting PO  Mech soft  Honey   Recommended Form of Meds: Crushed in puree as able    Strategies:   Upright as possible for all oral intake, Remain upright for 30-45 minutes after meals, Total feed, Eat/Feed slowly, Swallow 2 times per bite/sip, Small bites/sips    Education:  Patient Education: Completed on recommendations/plan  Patient Education Response: Verbalizes understanding, Needs reinforcement    Prognosis:   Guarded for safe diet tolerance    Plan:     Continue Dysphagia Therapy: YES    Interventions: Therapeutic Interventions: Therapeutic PO trials with SLP, Diet tolerance monitoring, Laryngeal exercises, Pharyngeal exercises, Oral motor exercises  Duration/Frequency of therapy while on unit: Duration/Frequency of Treatment  Duration/Frequency of Treatment: 3-5x/wk while on acute unit    Discharge Instructions:   Anticipate Yes for further skilled Speech Therapy for Dysphagia at discharge    This note serves as a D/C Summary in the event that this patient is discharged prior to the next therapy session. Coded treatment time:15  Total treatment time: [facility]  [address], [id number] [place] [address], [id number]  Speech-Language Pathologist  04/29/19 9:20 AM

## 2019-04-29 NOTE — PROGRESS NOTES
Physical Therapy  Brief Note    Patient Name: Sim Lay   Patient : 1955  MRN: [id number]   Room Number: I3S-9529/3956-57      I was documenting outside pt's room and overheard his sister-in-law calling to pt to prevent him from getting up out of chair. I assisted pt with getting up from recliner chair in [place] with Moderate Assistance and then dependently transferred him to bed with the lift. Pt began to hold onto SaraStedy bar with his right hand and required assist/maximal verbal cues to stop pulling on the bar. Pt required Contact Guard Assistance to sit down onto bed (c/o right knee pain again). Pt required Maximal Assistance for transition from sitting to supine and Total Assist for rolling in bed. Pt left supine in bed with bed alarm on. Pt heard coughing in the hallway with gurgling sounds so I elevated HOB to ~41* and coughing ceased. Will attempt treatment again tomorrow as schedule permits. This note also serves as a D/C Summary in the event that this patient is discharged prior to the next therapy session. Please refer to last PT note for goal status, discharge recommendations and functional status.       Time In: 1500  Time Out: 1510  Treatment Time: 10 minutes      Funmilayo Bullock, PT

## 2019-04-29 NOTE — CARE COORDINATION
Call to Maxwell ([phone]) at [facility]. They are able to accept patient back when ready to discharge. Call to brother Eduin Yusuf ([phone]) to inform and discuss. VM left requesting call back    Electronically signed by CARLY Garcia on 4/29/2019 at 12:40 PM      Spoke with brother Eduin Yusuf. At this time, plan is for patient to return to [facility] at discharge.     Electronically signed by CARLY Garcia on 4/29/2019 at 3:16 PM

## 2019-04-29 NOTE — PLAN OF CARE
Problem: Falls - Risk of:  Goal: Will remain free from falls  Description  Will remain free from falls  Outcome: Ongoing   Pt free from falls this shift. Fall precautions in place at all times. Call light always within reach. Pt able and agreeable to contact for safety appropriately. Problem: Skin Integrity:  Goal: Skin integrity will stabilize  Description  Skin integrity will stabilize  4/29/2019 0057 by Jaclyn Montesinos RN  Outcome: Ongoing  4/28/2019 1602 by Jameson Le RN  Note:   Skin assessment performed each shift and as needed. Pt will be enc to turn, reposition, and ambulate. Skin will be kept clean and dry. Problem: Pain:  Goal: Patient's pain/discomfort is manageable  Description  Patient's pain/discomfort is manageable  Outcome: Ongoing   Pain/discomfort being managed with PRN analgesics per MD orders. Pt able to express presence and absence of pain and rate pain appropriately using numerical scale.

## 2019-04-29 NOTE — PROGRESS NOTES
Physical Therapy    Daily Treatment Note    Patient Name: Kristine Gifford  Medical Record Number: [id number]  Room Number: L2C-4612/1163-26    ASSESSMENT: 69 y/o male return to ER 4/22/19, few hrs after d/c to SNF setting, with Septicemia, Cellulitis L UE.  L UE Dopplers pending. Recent Hospital Admit 4/14-4/22/19 with R Humerus Fx (Medically Manage) and R Femur Fx (ORIF/IM Nail 4/16/19, Dr Luis Lemos), 2 Rib Fxs (Displaced L 7th and 9th) following Fall at Home (ETOH). Patient is now NWB on R arm and 50% WB on R leg. He improved today: bed moblity with SBA, sit<>stand to [employer] with Min Assist. Recommend continued trial of skilled PT 3-5x/wk at discharge to optimize his functional mobility. Discharge Recommendations: 3-5 sessions per week, Patient would benefit from continued therapy after discharge  Equipment Needs: Equipment Needed: No(Defer to next level of care. )  Other: Defer to next level of care. Admission Date: 4/22/2019  8:09 PM    Additional Pertinent Hx: 69 y/o male return to ER 4/22/19, few hrs after d/c to SNF setting, with Septicemia, Cellulitis L UE.  L UE Dopplers pending. Recent Hospital Admit 4/14-4/22/19 with R Humerus Fx (Medically Manage) and R Femur Fx (ORIF/IM Nail 4/16/19, Dr Luis Lemos), 2 Rib Fxs (Displaced L 7th and 9th) following Fall at Home (ETOH). PMH as noted also including H/O Skull Fx/Elevation, ETOH. Diagnosis: Septicemia, Pneumonia, Cellulitis L UE. H/O Recent R Humerus/Femur Fxs and Rib Fxs due to Fall. Restrictions/Precautions: Fall Risk Other position/activity restrictions: R UE NWB immob with Sling (no ROM Exs), R LE 50% Wgt Bear. H/O ETOH. Diet : Dysphagia I, Pureed, Pudding Thick. PMHx:  has a past medical history of BPH (benign prostatic hyperplasia) and Seizures ([address]   PSgHx:   Past Surgical History:   Procedure Laterality Date    FEMUR FRACTURE SURGERY Right 4/16/2019    INTRAMEDULLARY NAILING RIGHT FEMUR performed by Sarah Marin MD at [place] [address]  SKULL FRACTURE ELEVATION         Home Environment / Functional History  Social/Functional History  Lives With: Alone  Type of Home: Mobile home  Home Layout: One level  Home Access: Stairs to enter with rails  Entrance Stairs - Number of Steps: 2 SANG per pt(?)  Bathroom Toilet: Standard  ADL Assistance: Independent  Homemaking Assistance: Independent  Ambulation Assistance: Independent  Transfer Assistance: Independent  Active : Yes  Additional Comments: Pt able to provide above information to PT, unsure extent of accuracy. PTA pt d/c from acute care to SNF setting. Per SS note pt's hx of PLOF was correct according to his brother. Pt's brother also reports pt has been some what  reclusive and has drank ETOH since age 13. Restrictions  Restrictions/Precautions: Fall Risk  Other position/activity restrictions: R UE NWB immob with Sling (no ROM Exs), R LE 50% Wgt Bear. H/O ETOH. Diet : Dysphagia I, Pureed, Pudding Thick. SUBJECTIVE: Pt supine in bed; speaking unclearly. Pt confused: states this is a hospital [facility]. Pt could not recall his WB precautions for R LE/UE -- thought he could put all of his weight on legs/arms. Able to repeat WB precautions after multiple corrections/cues. Pt c/o unrated right hip pain with sit-to-stand (\"I didn't think it could hurt that bad\").     Pain: Patient Currently in Pain: Denies      OBJECTIVE:    OBSERVATIONS  Observation: R UE Sling in place although required readjust by PT with Nursing assist.     Bed mobility  Supine to Sit: Stand by assistance(HOB up)  Scooting: Stand by assistance(pt hooking R leg to scoot to edge)    Transfers  Sit to Stand: Minimal Assistance, 2 Person Assistance(into SimonaaStedy, NWB on R UE, 50% WB on R LE)  Stand to sit: Minimal Assistance, 2 Person Assistance(from SaraStedy, NWB on R UE, 50% WB on R LE)  Bed to Chair: Dependent/Total(SaraStedy)    Ambulation  Ambulation  Ambulation?: No(non-ambulatory at this time due to combo on NWB on R UE and 50% WB on R LE)    W/C Mobility  Not assessed    Neuro/balance  Balance  Sitting - Static: Good(midline at edge of bed, upright, SBA)  Standing - Static: Fair, +(CGA standing in [place])  Comments: . Patient Education: Role of PT, POC, Need to call for assist.        Safety Devices: Type of devices: Call light within reach, Nurse notified, Patient at risk for falls, Left in chair, Chair alarm in place, All fall risk precautions in place      ASSESSMENT:   71 y/o male return to ER 4/22/19, few hrs after d/c to SNF setting, with Septicemia, Cellulitis L UE.  L UE Dopplers pending. Recent Hospital Admit 4/14-4/22/19 with R Humerus Fx (Medically Manage) and R Femur Fx (ORIF/IM Nail 4/16/19, Dr Oscar Calvo), 2 Rib Fxs (Displaced L 7th and 9th) following Fall at Home (ETOH). Patient is now NWB on R arm and 50% WB on R leg. He improved today: bed moblity with SBA, sit<>stand to [place] with Min Assist. Recommend continued trial of skilled PT 3-5x/wk at discharge to optimize his functional mobility. Alexandre Fagan scored a 10/24 on the AM-PAC short mobility form. Initial research suggests that an AM-PAC score of 18 or greater may be associated with a discharge to patient's home setting. However in this initial research, cut off scores do not perfectly predict discharge location: 25% of patients with a score of 18 or greater actually went to a rehab facility and 20% of people with a score less than 18 actually went home. Based on my clinical judgement I recommend that the patient have 3-5 sessions per week of Physical Therapy at d/c to increase the patients independence.     Goals:  Time Frame for Short term goals: Upon d/c acute care setting.  - all goals ongoing 04/29/19 except as noted below  Short term goal 1: Bed Mob Mod assist x 2 -- MET 4/29 -- Progress to sup<>sit with SBA   Short term goal 2: Attempt Transfer to/from chair with lift equipt/assist device, NWB R UE/50% WB R LE with assist x 2-3 -- MET 4/29 -- Rudi transfer sit<>stand with SBA   Short term goal 3: Pt will stand x 60 seconds with L UE support alone and SBA/CGA            Safety devices:   Type of devices: Call light within reach, Nurse notified, Patient at risk for falls, Left in chair, Chair alarm in place, All fall risk precautions in place        PLAN:  Times per week: 3-5x week while in acute care setting. ;    Current Treatment Recommendations: Strengthening, Functional Mobility Training, Transfer Training, Safety Education & Training, Patient/Caregiver Education & Training    If patient is discharged prior to next treatment, this note will serve as the discharge summary.     Therapy Time    Individual Concurrent Group Co-treatment   Time In 9425            Time Out 1210          Minutes 34             Timed Code Treatment Minutes: 34 Minutes      Reji Bullock, PT

## 2019-04-29 NOTE — PROGRESS NOTES
4th bag of Magnesium started as per protocol. Pt resting quietly in bed. Sling in place to right arm. [place] catheter in place, draining clear yellow urine. Bed alarm on, call light in reach. Will monitor.

## 2019-04-30 ENCOUNTER — APPOINTMENT (OUTPATIENT)
Dept: GENERAL RADIOLOGY | Age: 64
DRG: 871 | End: 2019-04-30
Payer: MEDICARE

## 2019-04-30 LAB
ALBUMIN SERPL-MCNC: 1.5 G/DL (ref 3.4–5)
ANION GAP SERPL CALCULATED.3IONS-SCNC: 7 MMOL/L (ref 3–16)
ATYPICAL LYMPHOCYTE RELATIVE PERCENT: 2 % (ref 0–6)
BANDED NEUTROPHILS RELATIVE PERCENT: 6 % (ref 0–7)
BASOPHILS ABSOLUTE: 0 K/UL (ref 0–0.2)
BASOPHILS RELATIVE PERCENT: 0 %
BUN BLDV-MCNC: <2 MG/DL (ref 7–20)
CALCIUM SERPL-MCNC: 7.7 MG/DL (ref 8.3–10.6)
CHLORIDE BLD-SCNC: 104 MMOL/L (ref 99–110)
CO2: 27 MMOL/L (ref 21–32)
CREAT SERPL-MCNC: <0.5 MG/DL (ref 0.8–1.3)
EOSINOPHILS ABSOLUTE: 0.5 K/UL (ref 0–0.6)
EOSINOPHILS RELATIVE PERCENT: 6 %
GFR AFRICAN AMERICAN: >60
GFR NON-AFRICAN AMERICAN: >60
GLUCOSE BLD-MCNC: 113 MG/DL (ref 70–99)
HCT VFR BLD CALC: 25.9 % (ref 40.5–52.5)
HEMOGLOBIN: 8.6 G/DL (ref 13.5–17.5)
HYPOCHROMIA: ABNORMAL
LYMPHOCYTES ABSOLUTE: 2.8 K/UL (ref 1–5.1)
LYMPHOCYTES RELATIVE PERCENT: 30 %
MAGNESIUM: 1.5 MG/DL (ref 1.8–2.4)
MCH RBC QN AUTO: 33.2 PG (ref 26–34)
MCHC RBC AUTO-ENTMCNC: 33.3 G/DL (ref 31–36)
MCV RBC AUTO: 99.8 FL (ref 80–100)
METAMYELOCYTES RELATIVE PERCENT: 1 %
MICROCYTES: ABNORMAL
MONOCYTES ABSOLUTE: 0.3 K/UL (ref 0–1.3)
MONOCYTES RELATIVE PERCENT: 3 %
NEUTROPHILS ABSOLUTE: 5.3 K/UL (ref 1.7–7.7)
NEUTROPHILS RELATIVE PERCENT: 52 %
PDW BLD-RTO: 15.9 % (ref 12.4–15.4)
PHOSPHORUS: 3.3 MG/DL (ref 2.5–4.9)
PLATELET # BLD: 299 K/UL (ref 135–450)
PMV BLD AUTO: 9.9 FL (ref 5–10.5)
POTASSIUM SERPL-SCNC: 3.9 MMOL/L (ref 3.5–5.1)
PROCALCITONIN: 0.24 NG/ML (ref 0–0.15)
RBC # BLD: 2.6 M/UL (ref 4.2–5.9)
SODIUM BLD-SCNC: 138 MMOL/L (ref 136–145)
WBC # BLD: 8.9 K/UL (ref 4–11)

## 2019-04-30 PROCEDURE — 2580000003 HC RX 258: Performed by: INTERNAL MEDICINE

## 2019-04-30 PROCEDURE — 97127 HC SP THER IVNTJ W/FOCUS COG FUNCJ: CPT

## 2019-04-30 PROCEDURE — 6360000002 HC RX W HCPCS: Performed by: INTERNAL MEDICINE

## 2019-04-30 PROCEDURE — 92526 ORAL FUNCTION THERAPY: CPT

## 2019-04-30 PROCEDURE — 1200000000 HC SEMI PRIVATE

## 2019-04-30 PROCEDURE — 73502 X-RAY EXAM HIP UNI 2-3 VIEWS: CPT

## 2019-04-30 PROCEDURE — 6370000000 HC RX 637 (ALT 250 FOR IP): Performed by: INTERNAL MEDICINE

## 2019-04-30 PROCEDURE — 83735 ASSAY OF MAGNESIUM: CPT

## 2019-04-30 PROCEDURE — 84145 PROCALCITONIN (PCT): CPT

## 2019-04-30 PROCEDURE — 6360000002 HC RX W HCPCS: Performed by: NURSE PRACTITIONER

## 2019-04-30 PROCEDURE — 2500000003 HC RX 250 WO HCPCS: Performed by: INTERNAL MEDICINE

## 2019-04-30 PROCEDURE — 80069 RENAL FUNCTION PANEL: CPT

## 2019-04-30 PROCEDURE — 85025 COMPLETE CBC W/AUTO DIFF WBC: CPT

## 2019-04-30 RX ADMIN — MAGNESIUM SULFATE HEPTAHYDRATE 1 G: 1 INJECTION, SOLUTION INTRAVENOUS at 10:42

## 2019-04-30 RX ADMIN — Medication 10 ML: at 20:47

## 2019-04-30 RX ADMIN — POTASSIUM CHLORIDE, DEXTROSE MONOHYDRATE AND SODIUM CHLORIDE: 150; 5; 450 INJECTION, SOLUTION INTRAVENOUS at 03:35

## 2019-04-30 RX ADMIN — AMPICILLIN AND SULBACTAM 3 G: 1; 2 INJECTION, POWDER, FOR SOLUTION INTRAMUSCULAR; INTRAVENOUS at 05:17

## 2019-04-30 RX ADMIN — AMPICILLIN AND SULBACTAM 3 G: 1; 2 INJECTION, POWDER, FOR SOLUTION INTRAMUSCULAR; INTRAVENOUS at 00:19

## 2019-04-30 RX ADMIN — AMPICILLIN AND SULBACTAM 3 G: 1; 2 INJECTION, POWDER, FOR SOLUTION INTRAMUSCULAR; INTRAVENOUS at 20:46

## 2019-04-30 RX ADMIN — PHENYTOIN SODIUM 100 MG: 50 INJECTION INTRAMUSCULAR; INTRAVENOUS at 20:47

## 2019-04-30 RX ADMIN — PHENYTOIN SODIUM 100 MG: 50 INJECTION INTRAMUSCULAR; INTRAVENOUS at 05:17

## 2019-04-30 RX ADMIN — AMPICILLIN AND SULBACTAM 3 G: 1; 2 INJECTION, POWDER, FOR SOLUTION INTRAMUSCULAR; INTRAVENOUS at 14:06

## 2019-04-30 RX ADMIN — PHENYTOIN SODIUM 100 MG: 50 INJECTION INTRAMUSCULAR; INTRAVENOUS at 15:21

## 2019-04-30 RX ADMIN — ENOXAPARIN SODIUM 40 MG: 40 INJECTION SUBCUTANEOUS at 10:42

## 2019-04-30 RX ADMIN — MAGNESIUM SULFATE HEPTAHYDRATE 1 G: 1 INJECTION, SOLUTION INTRAVENOUS at 12:11

## 2019-04-30 RX ADMIN — POTASSIUM CHLORIDE, DEXTROSE MONOHYDRATE AND SODIUM CHLORIDE: 150; 5; 450 INJECTION, SOLUTION INTRAVENOUS at 15:19

## 2019-04-30 ASSESSMENT — PAIN SCALES - GENERAL
PAINLEVEL_OUTOF10: 0
PAINLEVEL_OUTOF10: 0

## 2019-04-30 ASSESSMENT — PAIN SCALES - WONG BAKER
WONGBAKER_NUMERICALRESPONSE: 0

## 2019-04-30 NOTE — PROGRESS NOTES
Speech Language Pathology  [facility]   Speech Therapy  Daily Dysphagia Treatment Note    Ressie All  AGE: 70 y.o. GENDER: male  : 1955  [id number]  EPISODE DATE:  2019    Patient Active Problem List   Diagnosis    Fall    Right humeral fracture    Right femoral fracture (HCC)    Alcohol intoxication ([address]    Hypomagnesemia    Epilepsia ([address]    Closed fracture of neck of right femur ([address]    Alcohol dependence ([facility]    Multiple rib fractures    Syncope    Shortness of breath    Bronchitis    Tobacco abuse    ETOH abuse    Severe sepsis (HCC)    HAP (hospital-acquired pneumonia)    Septicemia (HCC)    Cellulitis of left upper extremity    Hypokalemia    Leukemoid reaction    Moderate malnutrition (HCC)     No Known Allergies    Treatment Diagnosis: Dysphagia     Remote Chart Review:  4/15/2019 CXR:  Impression   Unchanged left-sided rib fractures.  No underlying pneumothorax or pleural   effusion.       Suspect lower right-sided rib fractures, age indeterminate.          2019 MBS: Moderate oral stage dysphagia characterized by decreased mastication and decreased lingual manipulation which are both compounded by lethargy.  Prolonged mastication with solids with concern for excess labor with regular solids which were not administered d/t concern for excess labor.  Prolonged, disorganized bolus formation and movement with all trials with reduced bolus cohesion and control and premature bolus loss to the pharynx.  Oral residue with all trials is minimized with cued repeat swallow.  Moderate pharyngeal stage dysphagia characterized by delayed swallow, decreased laryngeal elevation, decreased pharyngeal peristalsis.  Premature spillage to the valleculae with overflow to the hypopharynx with all trials.  Vallecular and pyriform residue with all trials is minimized with cued repeat swallow.  Flash penetration with thin.  Suspect significantly improved swallow function as mental status improves. 4/21/2019 Hospitalist documented hospital course:  70 y. o. male with a past medical history of alcohol dependence and withdrawal seizures  who presented to [facility] he had a mechanical fall. Patient was intoxicated on admission. He was admitted with a right femoral shaft fracture along with humerus fracture and rib fracture. Patient underwent ORIF on 4/16/2019. Hospital course was complicated by delirium tremens and acute blood loss anemia along with bronchitis and possible aspiration pneumonia. Chart Review:   04/22/2019: Re-admitted to hospital with AMS. Patient was just discharged today. Per SNF patient has been confused and found to have redness to his left arm. Significant cough productive of sputum as well. Patient recently discharged for ORIF after femoral fracture. Started on abx for bronchitis. Hx ETOH abuse with confusion. Currently confused. No other associated symptoms. 4/24/2019 BSE completed. Puree/pudding rec  4/25/2019 puree/honey rec  4/28/2019 Re-eval orders for swallowing received and patient downgraded to NPO  4/30/2019 patient declined MBS    Subjective:     Current Diet : Mech soft  Current Liquid Diet : Honey    Comments regarding tolerating Current Diet:   RN reports increased gurgling with PO     Objective:     Pain   Patient Currently in Pain: Denies    Cognitive/Behavior   Behavior/Cognition: Alert with eyes open entire session, Cooperative, Confused, Requires cueing, Verbally responsive with minimal delay    Presentations   Consistencies Administered: Puree    Positioning   Upright in bed    PO Trials:  Patient met at bedside. · Puree: prolonged oral transit, decrease lingual control, suspected premature loss, swallow delay, decreased laryngeal elevation. No immediate overt s/s of penetration/aspiration. · Honey Thick: NA.  · Nectar Thick: NA.  · Thin: NA  Delayed wet coughing with PO; concern for reduced PO tolerance.   Modified Barium swallow to be re-attempted 5/1/2019. Recommend NPO pending MBS    Dysphagia Tx:   · PO trials: NPO pending MBS recommended  · Comp strats:  dependent  · Dysphagia ex: patient unable to sustain attention to task for dysphagia ex this date    Goals: The patient will tolerate recommended diet without observed clinical signs of aspiration, ongoing  The patient/caregiver will demonstrate understanding of compensatory strategies for improved swallowing safety. , ongoing, pt needs max cues, impulsive, poor safety awareness  The patient will improve oral motor function via therapeutic oral motor exercises to 5/5 each trial., not addressed this date  The patient will tolerate honey-thick liquids without signs and symptoms of aspiration 10/10 via cup. ongoing    Assessment:   Impressions:   Dysphagia Diagnosis: Severe dysphagia  · Patient was cooperative yet confused for direct dysphagia tx this session. · Delayed wet coughing with PO; concern for reduced PO tolerance. Modified Barium swallow to be re-attempted 5/1/2019. Recommend NPO pending MBS  · Discussed current recommendations with RN.       Diet Recommendations:  NPO pending MBS  Recommended Form of Meds: Crushed in puree as able    Strategies:   Routine oral care    Education:  Patient Education: Completed on recommendations/plan  Patient Education Response: Verbalizes understanding, Needs reinforcement    Prognosis:   Guarded for safe PO tolerance    Plan:     Continue Dysphagia Therapy: YES    Interventions: Therapeutic Interventions: Therapeutic PO trials with SLP, Diet tolerance monitoring, Laryngeal exercises, Pharyngeal exercises, Oral motor exercises  Duration/Frequency of therapy while on unit: Duration/Frequency of Treatment  Duration/Frequency of Treatment: 3-5x/wk while on acute unit    Discharge Instructions:   Anticipate Yes for further skilled Speech Therapy for Dysphagia at discharge    This note serves as a D/C Summary in the event that this patient is discharged prior to the next therapy session. Coded treatment time:15  Total treatment time: [address] [place], [place] [address], [id number]  Speech-Language Pathologist  04/30/19 1:45 PM

## 2019-04-30 NOTE — PROGRESS NOTES
Pt becoming increasingly confused and attempting to get out of bed, call to family. Tele cam in place.

## 2019-04-30 NOTE — CARE COORDINATION
[facility]  Palliative Care   Progress Note    NAME:  Terry Cevallos RECORD NUMBER:  [id number]  AGE: 70 y.o. GENDER: male  : 1955  TODAY'S DATE:  2019    Subjective: Up in chair mentation about same as yesterday,     Objective:    Vitals:    19 1056   BP: 105/66   Pulse: 94   Resp: 17   Temp: 98.8 °F (37.1 °C)   SpO2: 92%     Lab Results   Component Value Date    WBC 8.9 2019    HGB 8.6 (L) 2019    HCT 25.9 (L) 2019    MCV 99.8 2019     2019     Lab Results   Component Value Date    CREATININE <0.5 (L) 2019    BUN <2 (L) 2019     2019    K 3.9 2019     2019    CO2 27 2019     Lab Results   Component Value Date    ALT 17 2019    AST 20 2019    ALKPHOS 83 2019    BILITOT 0.5 2019       Plan: He has refused to take MBS today states he will do it tomorrow. Code Status: Limited  Discharge Environment:  [] Hospice Consult Agency:  [] Inpatient Hospice    [] Home with [address] [place] [address]   [] ECF with Hospice  [] ECF skilled care with Hospice to follow   [] Other:    Teaching Time:  20 min     I will continue to follow Mr. Callahan's care as needed. Thank you for allowing me to participate in the care of Mr. Rosy Mallory .      Electronically signed by Shanel Campos RN, [address] on 2019 at 1:56 PM  [address] [place] [address]  Office: [phone]

## 2019-04-30 NOTE — PROGRESS NOTES
Hospitalist Progress Note      PCP: Magnolia Roth MD    Date of Admission: 4/22/2019    Chief Complaint: Dyspnea      Subjective: Intermittent cough. Denies dyspnea  Poor po intake with concerns for aspiration per nursing    Medications:  Reviewed    Infusion Medications    dextrose 5% and 0.45% NaCl with KCl 20 mEq 75 mL/hr at 04/30/19 1519     Scheduled Medications    ampicillin-sulbactam  3 g Intravenous Q6H    thiamine  100 mg Oral Daily    phenytoin  100 mg Intravenous Q8H    nicotine  1 patch Transdermal Q24H    sodium chloride flush  10 mL Intravenous 2 times per day    sodium chloride flush  10 mL Intravenous 2 times per day    enoxaparin  40 mg Subcutaneous Daily    multivitamin  1 tablet Oral Daily    folic acid  1 mg Oral Daily     PRN Meds: sodium chloride flush, potassium chloride, sodium chloride flush, magnesium sulfate, haloperidol lactate      Intake/Output Summary (Last 24 hours) at 4/30/2019 1523  Last data filed at 4/30/2019 1144  Gross per 24 hour   Intake 0 ml   Output 700 ml   Net -700 ml       Exam:    /70   Pulse 88   Temp 98.6 °F (37 °C) (Oral)   Resp 16   Ht 5' 9\" (1.753 m)   Wt 172 lb 6.4 oz (78.2 kg)   SpO2 94%   BMI 25.46 kg/m²     Gen/overall appearance: Not in acute distress. Alert. Head: Normocephalic, atraumatic  Eyes: EOMI, no scleral icterus  CVS: regular rate and rhythm, Normal S1S2  Pulm: Coarse breath sounds, no wheezing  Gastrointestinal: Soft, nontender, nondistended, no guarding or rebound  Extremities: R arm sling. L arm swelling and erythema.    Neuro: No gross focal deficits noted  Skin: Warm, dry    Labs:   Recent Labs     04/28/19  0645 04/29/19  0520 04/30/19  0600   WBC 8.3 9.3 8.9   HGB 6.4* 8.7* 8.6*   HCT 20.6* 25.7* 25.9*    283 299     Recent Labs     04/28/19  0645 04/29/19  0520 04/30/19  0600    136 138   K 5.2* 4.8 3.9    102 104   CO2 25 27 27   BUN 2* <2* <2*   CREATININE <0.5* <0.5* <0.5*   CALCIUM 6. 9* 7.2* 7.7*   PHOS 2.4* 2.6 3.3     No results for input(s): AST, ALT, BILIDIR, BILITOT, ALKPHOS in the last 72 hours. No results for input(s): INR in the last 72 hours. No results for input(s): Ramona Ka in the last 72 hours. Assessment/Plan:    Active Hospital Problems    Diagnosis Date Noted    Moderate malnutrition ([facility] [facility] [E44.0] 04/24/2019    Severe sepsis ([facility] [address] [A41.9, R65.20] 04/23/2019    HAP (hospital-acquired pneumonia) [J18.9]     Septicemia ([facility] [address] [A41.9]     Cellulitis of left upper extremity [L03.114]     Hypokalemia [E87.6]     Leukemoid reaction [D72.823]      Sepsis 2/2 aspiration PNA  Severe dysphagia  Acute metabolic encephalopathy likley sec to above; improved  Acute hypoxic respiratory failure - Resolved; now on room air  Acute on chronic macrocytic anemia. No gross signs of bleeding. Possibly hemodilution. S/p 1u PRBCs  L arm swelling with chronic superficial venous thrombosis  Right femoral fracture shaft and subtrochanteric s/p ORIF on 4/16/19   Recent non displaced  Humeral fracture  Alcohol dependence with abuse.   No clinical signs of withdrawal at this time  Hx of seizure d/o; on phenytoin    Pending barium swallow  C/w IV unasyn  Gentle IVF hydration  NPO for now  Respiratory care  Med nebs  PT/OT    DVT Prophylaxis:  lovenox  Diet: Diet NPO Effective Now  Code Status: Limited    Dispo - Inpt, 2-4 days    Margarita Wolfe MD

## 2019-04-30 NOTE — PROGRESS NOTES
Speech Language Pathology    Patient declining MBS at this time. ST to reschedule MBS. Tentative plan is for 1PM, tomorrow, 5/1/2019. Thank you. Nelsy Guerrero, #[id number]  Speech-Language Pathologist

## 2019-04-30 NOTE — PROGRESS NOTES
Staples removed from 3 sites on right hip, and 1 site on right outer knee. No complications, well approximated. Right hip sites are slightly red and warm to the touch. Steri strips applied per order.  Blanca Hernández

## 2019-04-30 NOTE — PROGRESS NOTES
Pt refused Modified barrium says he will try tomorrow I spoke at length with him why this test was important. Pt also spoke with sister in law Aniya Darby. Continues to refuse. Assisted back to bed.

## 2019-05-01 ENCOUNTER — APPOINTMENT (OUTPATIENT)
Dept: GENERAL RADIOLOGY | Age: 64
DRG: 871 | End: 2019-05-01
Payer: MEDICARE

## 2019-05-01 LAB
ALBUMIN SERPL-MCNC: 1.8 G/DL (ref 3.4–5)
ANION GAP SERPL CALCULATED.3IONS-SCNC: 8 MMOL/L (ref 3–16)
BANDED NEUTROPHILS RELATIVE PERCENT: 6 % (ref 0–7)
BASOPHILS ABSOLUTE: 0 K/UL (ref 0–0.2)
BASOPHILS RELATIVE PERCENT: 0 %
BUN BLDV-MCNC: <2 MG/DL (ref 7–20)
CALCIUM SERPL-MCNC: 7.4 MG/DL (ref 8.3–10.6)
CHLORIDE BLD-SCNC: 104 MMOL/L (ref 99–110)
CO2: 27 MMOL/L (ref 21–32)
CREAT SERPL-MCNC: <0.5 MG/DL (ref 0.8–1.3)
EOSINOPHILS ABSOLUTE: 0.8 K/UL (ref 0–0.6)
EOSINOPHILS RELATIVE PERCENT: 10 %
GFR AFRICAN AMERICAN: >60
GFR NON-AFRICAN AMERICAN: >60
GLUCOSE BLD-MCNC: 104 MG/DL (ref 70–99)
HCT VFR BLD CALC: 25.8 % (ref 40.5–52.5)
HEMOGLOBIN: 8.8 G/DL (ref 13.5–17.5)
LYMPHOCYTES ABSOLUTE: 1 K/UL (ref 1–5.1)
LYMPHOCYTES RELATIVE PERCENT: 12 %
MAGNESIUM: 1.7 MG/DL (ref 1.8–2.4)
MCH RBC QN AUTO: 34.2 PG (ref 26–34)
MCHC RBC AUTO-ENTMCNC: 34.1 G/DL (ref 31–36)
MCV RBC AUTO: 100.1 FL (ref 80–100)
MONOCYTES ABSOLUTE: 0.2 K/UL (ref 0–1.3)
MONOCYTES RELATIVE PERCENT: 2 %
NEUTROPHILS ABSOLUTE: 6.1 K/UL (ref 1.7–7.7)
NEUTROPHILS RELATIVE PERCENT: 70 %
PDW BLD-RTO: 15.3 % (ref 12.4–15.4)
PHOSPHORUS: 3.7 MG/DL (ref 2.5–4.9)
PLATELET # BLD: 300 K/UL (ref 135–450)
PMV BLD AUTO: 9.6 FL (ref 5–10.5)
POTASSIUM SERPL-SCNC: 3.9 MMOL/L (ref 3.5–5.1)
PROCALCITONIN: 0.18 NG/ML (ref 0–0.15)
RBC # BLD: 2.58 M/UL (ref 4.2–5.9)
SLIDE REVIEW: ABNORMAL
SODIUM BLD-SCNC: 139 MMOL/L (ref 136–145)
WBC # BLD: 8 K/UL (ref 4–11)

## 2019-05-01 PROCEDURE — 84145 PROCALCITONIN (PCT): CPT

## 2019-05-01 PROCEDURE — 2500000003 HC RX 250 WO HCPCS: Performed by: INTERNAL MEDICINE

## 2019-05-01 PROCEDURE — 80069 RENAL FUNCTION PANEL: CPT

## 2019-05-01 PROCEDURE — 1200000000 HC SEMI PRIVATE

## 2019-05-01 PROCEDURE — 2580000003 HC RX 258: Performed by: INTERNAL MEDICINE

## 2019-05-01 PROCEDURE — 92611 MOTION FLUOROSCOPY/SWALLOW: CPT

## 2019-05-01 PROCEDURE — 97530 THERAPEUTIC ACTIVITIES: CPT

## 2019-05-01 PROCEDURE — 6360000002 HC RX W HCPCS: Performed by: INTERNAL MEDICINE

## 2019-05-01 PROCEDURE — 74230 X-RAY XM SWLNG FUNCJ C+: CPT

## 2019-05-01 PROCEDURE — 83735 ASSAY OF MAGNESIUM: CPT

## 2019-05-01 PROCEDURE — 6370000000 HC RX 637 (ALT 250 FOR IP): Performed by: INTERNAL MEDICINE

## 2019-05-01 PROCEDURE — 85025 COMPLETE CBC W/AUTO DIFF WBC: CPT

## 2019-05-01 PROCEDURE — 92526 ORAL FUNCTION THERAPY: CPT

## 2019-05-01 PROCEDURE — 99231 SBSQ HOSP IP/OBS SF/LOW 25: CPT | Performed by: INTERNAL MEDICINE

## 2019-05-01 PROCEDURE — 94760 N-INVAS EAR/PLS OXIMETRY 1: CPT

## 2019-05-01 RX ADMIN — Medication 10 ML: at 20:58

## 2019-05-01 RX ADMIN — ENOXAPARIN SODIUM 40 MG: 40 INJECTION SUBCUTANEOUS at 08:24

## 2019-05-01 RX ADMIN — AMPICILLIN AND SULBACTAM 3 G: 1; 2 INJECTION, POWDER, FOR SOLUTION INTRAMUSCULAR; INTRAVENOUS at 02:56

## 2019-05-01 RX ADMIN — PHENYTOIN SODIUM 100 MG: 50 INJECTION INTRAMUSCULAR; INTRAVENOUS at 15:08

## 2019-05-01 RX ADMIN — AMPICILLIN AND SULBACTAM 3 G: 1; 2 INJECTION, POWDER, FOR SOLUTION INTRAMUSCULAR; INTRAVENOUS at 15:08

## 2019-05-01 RX ADMIN — Medication 10 ML: at 08:32

## 2019-05-01 RX ADMIN — POTASSIUM CHLORIDE, DEXTROSE MONOHYDRATE AND SODIUM CHLORIDE: 150; 5; 450 INJECTION, SOLUTION INTRAVENOUS at 03:40

## 2019-05-01 RX ADMIN — AMPICILLIN AND SULBACTAM 3 G: 1; 2 INJECTION, POWDER, FOR SOLUTION INTRAMUSCULAR; INTRAVENOUS at 08:27

## 2019-05-01 RX ADMIN — POTASSIUM CHLORIDE, DEXTROSE MONOHYDRATE AND SODIUM CHLORIDE: 150; 5; 450 INJECTION, SOLUTION INTRAVENOUS at 17:29

## 2019-05-01 RX ADMIN — AMPICILLIN AND SULBACTAM 3 G: 1; 2 INJECTION, POWDER, FOR SOLUTION INTRAMUSCULAR; INTRAVENOUS at 20:54

## 2019-05-01 RX ADMIN — PHENYTOIN SODIUM 100 MG: 50 INJECTION INTRAMUSCULAR; INTRAVENOUS at 05:29

## 2019-05-01 ASSESSMENT — PAIN SCALES - GENERAL
PAINLEVEL_OUTOF10: 0
PAINLEVEL_OUTOF10: 0

## 2019-05-01 NOTE — PLAN OF CARE
Problem: Falls - Risk of:  Goal: Will remain free from falls  Description  Will remain free from falls  Outcome: Ongoing  Note:   Pt free from falls this shift. Fall precautions in place at all times. Call light within reach. Pt able to and agreeable to contact for safety appropriately. Problem: Pain:  Goal: Patient's pain/discomfort is manageable  Description  Patient's pain/discomfort is manageable  Outcome: Ongoing  Note:   Pain/discomfort being managed with PRN analgesics per MD order. Pt able to express presence and absence of pain and rate pain appropriately using numerical scale       Problem: Skin Integrity:  Goal: Skin integrity will stabilize  Description  Skin integrity will stabilize  Outcome: Ongoing  Note:   Skin assessment completed every shift. Pt assessed for incontinence, appropriate barrier cream applied prn as needed. Pt encouraged to turn/rotate every 2 hours. Assistance provided if pt unable to do so themselves. Problem: Nutrition  Goal: Optimal nutrition therapy  Outcome: Ongoing     Problem: Safety:  Goal: Free from accidental physical injury  Description  Free from accidental physical injury  Note:   Measures were made to prevent accidental needle stick, friction, shear, etc. Environment kept free of clutter and adequate lighting provided. Will continue to monitor for physical injury.

## 2019-05-01 NOTE — PROGRESS NOTES
input(s): AMYLASE,  ALB  PT/INR: No results for input(s): PROTIME, INR in the last 72 hours. APTT: No results for input(s): APTT in the last 72 hours. UA:  No results for input(s): NITRITE, COLORU, PHUR, LABCAST, WBCUA, RBCUA, MUCUS, TRICHOMONAS, YEAST, BACTERIA, CLARITYU, SPECGRAV, LEUKOCYTESUR, UROBILINOGEN, BILIRUBINUR, BLOODU, GLUCOSEU, AMORPHOUS in the last 72 hours. Invalid input(s): Corinna Morrison  No results for input(s): PH, PCO2, PO2 in the last 72 hours. Cx:      Films:       Assessment:     Healthcare associated pneumonia, possible MRSA and gram negative pneumonia   Hx EtOH abuse  altered mental status  Cellulitis  Acute hypoxemia, saturation < 90% on room air   Bilateral pleural effusion   Anemia  Rib fracture, left status post fall. SVT in left upper ext. Plan:      hcap  -  Cefepime x 5 days. -   flagyl x 5 days             Will sign off at this time. Thanks for the consult. Please call with questions.         Jud Mckeon Pulmonary, [facility] [phone]

## 2019-05-01 NOTE — PROCEDURES
INSTRUMENTAL SWALLOW REPORT  MODIFIED BARIUM SWALLOW    NAME: Quinn Wilson   : 1955  MRN: [id number]       Date of Eval: 2019     Ordering Physician: Fede  Radiologist: Jas Khan     Referring Diagnosis(es): Referring Diagnosis: Oropharyngeal dysphagia; r 13.12    Quinn Wilson  has a past medical history of BPH (benign prostatic hyperplasia) and Seizures ([address] He  has a past surgical history that includes Femur fracture surgery (Right, 2019) and Skull fracture elevation. Current Diet Solid Consistency: NPO  Current Diet Liquid Consistency: NPO    Type of Study: Repeat MBS  Results of Prior Study: Recommend dental soft/thin    Remote Chart Review  4/15/2019 CXR:  Impression   Unchanged left-sided rib fractures.  No underlying pneumothorax or pleural   effusion.       Suspect lower right-sided rib fractures, age indeterminate.         2019 MBS: Moderate oral stage dysphagia characterized by decreased mastication and decreased lingual manipulation which are both compounded by lethargy.  Prolonged mastication with solids with concern for excess labor with regular solids which were not administered d/t concern for excess labor.  Prolonged, disorganized bolus formation and movement with all trials with reduced bolus cohesion and control and premature bolus loss to the pharynx.  Oral residue with all trials is minimized with cued repeat swallow. Moderate pharyngeal stage dysphagia characterized by delayed swallow, decreased laryngeal elevation, decreased pharyngeal peristalsis.  Premature spillage to the valleculae with overflow to the hypopharynx with all trials.  Vallecular and pyriform residue with all trials is minimized with cued repeat swallow.  Flash penetration with thin.  Suspect significantly improved swallow function as mental status improves. 2019 Hospitalist documented hospital course:  63 y. o. male with a past medical history of alcohol dependence and withdrawal seizures  who presented to [facility] he had a mechanical fall. Patient was intoxicated on admission. He was admitted with a right femoral shaft fracture along with humerus fracture and rib fracture. Patient underwent ORIF on 4/16/2019. Hospital course was complicated by delirium tremens and acute blood loss anemia along with bronchitis and possible aspiration pneumonia. Chart Review:   04/22/2019: Re-admitted to hospital with AMS. Patient was just discharged today. Per SNF patient has been confused and found to have redness to his left arm. Significant cough productive of sputum as well. Patient recently discharged for ORIF after femoral fracture.  Started on abx for bronchitis. Hx ETOH abuse with confusion. Currently confused. No other associated symptoms. 4/24/2019 BSE completed. Puree/pudding rec  4/25/2019 puree/honey rec  4/28/2019 Re-eval orders for swallowing received and patient downgraded to NPO  4/30/2019 patient declined MBS    Patient Complaints/Reason for Referral:  Ann Lo was referred for a MBS to assess the efficiency of his/her swallow function, assess for aspiration, and to make recommendations regarding safe dietary consistencies, effective compensatory strategies, and safe eating environment. Patient complaints: Patient unreliable historian; medical team continued concern for increased penetration/aspiration risk    Onset of problem: 04/19/2019    Subjective: Accepted and tolerated MBSS in fluoroscopy suite. Patient was alert, cooperative and pleasant. Concern for penetration/aspiration; risk d/t cognitive status continuously changing    Pain   Pain Level: 0    Behavior/Cognition/Vision/Hearing:  Behavior/Cognition: Alert; Cooperative;Confused; Impulsive; Requires cueing  Vision: Impaired  Vision Exceptions: Wears glasses at all times  Hearing: Within functional limits    Impressions:  Patient Position: Lateral and Patient Degrees: Fully upright in VSE chair    Consistencies Administered: Puree;Mechanical soft solid; Soft solid; Honey cup;Nectar cup; Thin cup; Thin straw    Oral Phase: Moderate oral stage dysphagia characterized by decreased mastication and decreased lingual manipulation which are both compounded by lethargy. Prolonged mastication with solids with concern for excess labor with solids. Prolonged, disorganized bolus formation and movement with all trials with reduced bolus cohesion and control and premature bolus loss to the pharynx. Oral residue with all trials is minimized with cued repeat swallow. Pharyngeal: Moderate pharyngeal stage dysphagia characterized by delayed swallow, decreased laryngeal elevation, decreased pharyngeal peristalsis. Premature spillage to the valleculae with overflow to the hypopharynx with all trials. Vallecular and pyriform residue with all trials is minimized with cued repeat swallow. Deep penetration with thin- cup and thin-straw. D/t the amount of residue that builds up in the pyriforms throughout study, patient is at an increased risk for penetration/aspiration of residue after the swallow. Upper Esophageal Screen: KOKI    Dysphagia Outcome Severity Scale: Level 3: Moderate dysphagia- Total assisstance, supervision or strategies. Two or more diet consistencies restricted     Penetration-Aspiration Scale (PAS): 3 - Material enters the airway, remains above the vocal folds, and is not ejected from airway    Recommended Diet:  Solid consistency: Dysphagia I Pureed  Liquid consistency: Nectar  Medication administration: PO    Safe Swallow Protocol:  Supervision: 1:1  Compensatory Swallowing Strategies: Upright as possible for all oral intake;Remain upright for 30-45 minutes after meals; Total feed;Eat/Feed slowly; Swallow 2 times per bite/sip;Small bites/sips    Recommendations/Treatment  Requires SLP Intervention: Yes  D/C Recommendations:  To be determined    Recommendations:  Therapeutic Interventions: Therapeutic PO trials with SLP;Diet tolerance monitoring; Laryngeal exercises; Pharyngeal exercises;Oral motor exercises    Education:  Patient Education: Completed on recommendations/plan  Patient Education Response: Verbalizes understanding;Needs reinforcement    Prognosis for safe diet advancement: good  Barriers to reach goals: cognitive deficits;severity of dysphagia;behavior  Duration/Frequency of Treatment: ST to treat 3-5 times a week for safe diet tolerance    Dysphagia Goals:  · The patient will tolerate recommended diet without observed clinical signs of aspiration  · The patient/caregiver will demonstrate understanding of compensatory strategies for improved swallowing safety. · The patient will tolerate mechanical soft foods 10/10. Oral Preparation / Oral Phase  Oral Phase - Major Contributing Deficits  Poor Mastication: Soft solid;Mechanical soft solid(prolonged; concern for excess labor with regular solid which was not administered d/t fatigue concerns)  Weak Lingual Manipulation: Soft solid;Mechanical soft solid;Puree  Reduced Posterior Propulsion: Puree;Mechanical soft solid; Soft solid; Honey cup;Nectar cup  Reduced Bolus Control: Mechanical soft solid; Soft solid;Puree; Honey cup;Nectar cup; Thin cup; Thin straw  Decreased Bolus Cohesion: Soft solid;Mechanical soft solid;Puree; Honey cup;Nectar cup; Thin cup; Thin straw  Lingual / Palatal Residue: Soft solid;Mechanical soft solid;Puree; Thin cup; Thin straw;Honey cup;Nectar cup  Premature Bolus Loss to Pharynx: Soft solid;Mechanical soft solid;Puree; Honey cup;Nectar cup; Thin straw  Reduced Tongue Base Retraction: Soft solid;Mechanical soft solid;Puree; Honey cup;Nectar cup; Thin cup; Thin straw    Pharyngeal Phase  Pharyngeal Phase - Major Contributing Deficits  Delayed Swallow Initiation: Soft solid;Mechanical soft solid;Puree; Honey cup;Nectar cup; Thin cup; Thin straw  Premature Spillage to Valleculae: Puree;Mechanical soft solid; Soft solid; Honey cup;Nectar cup  Reduced Pharyngeal

## 2019-05-01 NOTE — PROGRESS NOTES
Hospitalist Progress Note      PCP: Kaycee Patton MD    Date of Admission: 4/22/2019    Chief Complaint: Dyspnea      Subjective: Intermittent cough. Denies dyspnea at this time  Pending barium; apparently declined yesterday. Medications:  Reviewed    Infusion Medications    dextrose 5% and 0.45% NaCl with KCl 20 mEq 75 mL/hr at 05/01/19 0340     Scheduled Medications    ampicillin-sulbactam  3 g Intravenous Q6H    thiamine  100 mg Oral Daily    phenytoin  100 mg Intravenous Q8H    nicotine  1 patch Transdermal Q24H    sodium chloride flush  10 mL Intravenous 2 times per day    sodium chloride flush  10 mL Intravenous 2 times per day    enoxaparin  40 mg Subcutaneous Daily    multivitamin  1 tablet Oral Daily    folic acid  1 mg Oral Daily     PRN Meds: sodium chloride flush, potassium chloride, sodium chloride flush, magnesium sulfate, haloperidol lactate      Intake/Output Summary (Last 24 hours) at 5/1/2019 1336  Last data filed at 5/1/2019 1325  Gross per 24 hour   Intake 3060 ml   Output 1100 ml   Net 1960 ml       Exam:    BP (!) 110/59   Pulse 89   Temp 97.9 °F (36.6 °C) (Oral)   Resp 18   Ht 5' 9\" (1.753 m)   Wt 172 lb 6.4 oz (78.2 kg)   SpO2 98%   BMI 25.46 kg/m²     Gen/overall appearance: Not in acute distress. Alert. Head: Normocephalic, atraumatic  Eyes: EOMI, no scleral icterus  CVS: regular rate and rhythm, Normal S1S2  Pulm: Coarse breath sounds, no wheezing  Gastrointestinal: Soft, nontender, nondistended, no guarding or rebound  Extremities: R arm sling. L arm swelling and erythema.    Neuro: No gross focal deficits noted  Skin: Warm, dry    Labs:   Recent Labs     04/29/19 0520 04/30/19  0600 05/01/19  0529   WBC 9.3 8.9 8.0   HGB 8.7* 8.6* 8.8*   HCT 25.7* 25.9* 25.8*    299 300     Recent Labs     04/29/19  0520 04/30/19  0600 05/01/19  0529    138 139   K 4.8 3.9 3.9    104 104   CO2 27 27 27   BUN <2* <2* <2*   CREATININE <0.5* <0.5* <0.5* CALCIUM 7.2* 7.7* 7.4*   PHOS 2.6 3.3 3.7     No results for input(s): AST, ALT, BILIDIR, BILITOT, ALKPHOS in the last 72 hours. No results for input(s): INR in the last 72 hours. No results for input(s): Terryann Haste in the last 72 hours. Assessment/Plan:    Active Hospital Problems    Diagnosis Date Noted    Moderate malnutrition ([facility] [address] [E44.0] 04/24/2019    Severe sepsis ([facility] [address] [A41.9, R65.20] 04/23/2019    HAP (hospital-acquired pneumonia) [J18.9]     Septicemia ([facility] [address] [A41.9]     Cellulitis of left upper extremity [L03.114]     Hypokalemia [E87.6]     Leukemoid reaction [D72.823]      Sepsis 2/2 aspiration PNA  Severe dysphagia  Acute metabolic encephalopathy likley sec to above; improved  Acute hypoxic respiratory failure - Resolved; now on room air  Acute on chronic macrocytic anemia. No gross signs of bleeding. Possibly hemodilution. S/p 1u PRBCs  L arm swelling with chronic superficial venous thrombosis  Right femoral fracture shaft and subtrochanteric s/p ORIF on 4/16/19   Recent non displaced  Humeral fracture  Alcohol dependence with abuse.   No clinical signs of withdrawal at this time  Hx of seizure d/o; on phenytoin    Pending barium swallow  C/w IV unasyn  Gentle IVF hydration  NPO for now  Respiratory care  Med nebs  PT/OT    DVT Prophylaxis:  lovenox  Diet: Diet NPO Effective Now  Code Status: Limited    Dispo - Inpt, 2-4 days    Bebo Narayanan MD

## 2019-05-01 NOTE — PROGRESS NOTES
Physical Therapy  Facility/Department: [address] MED SURG  Daily Treatment Note/Cotx with OT   NAME: Norman Gill  : 1955  MRN: [id number]    Date of Service: 2019    Discharge Recommendations:  3-5 sessions per week, Patient would benefit from continued therapy after discharge   PT Equipment Recommendations  Other: Defer to next level of care. Assessment: 71 y/o male return to ER 19, few hrs after d/c to SNF setting, with Septicemia, Cellulitis L UE.  L UE Dopplers pending. Recent Hospital Admit -19 with R Humerus Fx (Medically Manage) and R Femur Fx (ORIF/IM Nail 19, Dr Elke Garber), 2 Rib Fxs (Displaced L 7th and 9th) following Fall at Home (ETOH). Patient continues with NWB on R arm and 50% WB on R leg. He is slowly imporving today with bed moblity with Min A, sit<>stand with Min Assist x 2 persons, but Mod A x 2 for ther pivot-transfer bed > recliner. Recommend continued skilled PT 3-5x/wk at discharge to optimize his functional mobility. Patient Diagnosis(es): The primary encounter diagnosis was Septicemia ([address] Diagnoses of Leukemoid reaction, Hypokalemia, Hypomagnesemia, Cellulitis of left upper extremity, and HAP (hospital-acquired pneumonia) were also pertinent to this visit. has a past medical history of BPH (benign prostatic hyperplasia) and Seizures ([address] has a past surgical history that includes Femur fracture surgery (Right, 2019) and Skull fracture elevation. Restrictions  Restrictions/Precautions  Restrictions/Precautions: Fall Risk, Weight Bearing  Lower Extremity Weight Bearing Restrictions  Right Lower Extremity Weight Bearing: Partial Weight Bearing(50% WB R LE)  Upper Extremity Weight Bearing Restrictions  Right Upper Extremity Weight Bearing: Non Weight Bearing  Position Activity Restriction  Other position/activity restrictions: R UE NWB immob with Sling (no ROM Exs), R LE 50% Wgt Bear. H/O ETOH. Diet : Dysphagia I, Pureed, Pudding Thick. patients independence. Goals  Short term goals  Time Frame for Short term goals: Upon d/c acute care setting. --goals updated & revised as maciej, 5/1/19   Short term goal 1: bed mobility sup<>sit with SBA  Short term goal 2: Transfer to/from chair via stand-pivot and equipt/assist device as able, NWB R UE/50% WB R LE, with Min A x 1-2. Short term goal 3: Pt will stand x 60 seconds with L UE support alone and SBA/CGA  Patient Goals   Patient goals : None stated. Plan    Plan  Times per week: 3-5x week while in acute care setting. Current Treatment Recommendations: Strengthening, Functional Mobility Training, Transfer Training, Safety Education & Training, Patient/Caregiver Education & Training  Safety Devices  Type of devices: Call light within reach, Nurse notified, Patient at risk for falls, Left in chair, Chair alarm in place, All fall risk precautions in place, Telesitter in use     Therapy Time   Individual Concurrent Group Co-treatment   Time In 0836         Time Out 0916         Minutes 40           If patient is discharged prior to the next Physical Therapy visit, please see last written PT note for discharge status.     Saqib Sanchez PT Electronically signed by Saqib Sanchez PT on 5/1/2019 at 9:29 AM

## 2019-05-01 NOTE — CARE COORDINATION
[facility] Wound Ostomy Continence Nurse  Follow-up Progress Note       NAME:  Dany Eagle  MEDICAL RECORD NUMBER:  [id number]  AGE:  70 y.o. GENDER:  male  :  1955  TODAY'S DATE:  2019    Subjective: Follow up to evaluate right heel deep tissue pressure injury. Right heel wound evolving into pale pink color full blister, but remains intact at this time. See wound details, measurements, photos, and plan of care below. Wound Identification:  Wound Type: pressure  Contributing Factors: edema, chronic pressure, decreased mobility, shear force, malnutrition and non-adherence        Patient Goal of Care:  [x] Wound Healing  [] Odor Control  [] Palliative Care  [] Pain Control   [] Other:     Objective:    BP (!) 110/59   Pulse 89   Temp 97.9 °F (36.6 °C) (Oral)   Resp 18   Ht 5' 9\" (1.753 m)   Wt 172 lb 6.4 oz (78.2 kg)   SpO2 98%   BMI 25.46 kg/m²   Mahesh Risk Score: Mahesh Scale Score: 13 at risk   Assessment:   Measurements:  Wound 19 Heel Right (Active)   Wound Image    2019 10:30 AM   Wound Deep tissue/Injury 2019 10:30 AM   Dressing Status Changed 2019 10:30 AM   Dressing Changed Changed/New 2019 10:30 AM   Dressing/Treatment Barrier Film 2019 10:30 AM   Wound Cleansed Not Cleansed 2019  9:42 PM   Dressing Change Due 19 10:30 AM   Wound Length (cm) 3.5 cm 2019 10:30 AM   Wound Width (cm) 4 cm 2019 10:30 AM   Wound Depth (cm) 0 cm 2019 10:30 AM   Wound Surface Area (cm^2) 14 cm^2 2019 10:30 AM   Change in Wound Size % (l*w) 37.78 2019 10:30 AM   Wound Volume (cm^3) 0 cm^3 2019 10:30 AM   Wound Assessment Dry; Intact;Pale;Pink 2019 10:30 AM   Drainage Amount None 2019 10:30 AM   Odor None 2019 10:30 AM   Margins Attached edges 2019 10:30 AM   [place]-wound Assessment Dry; Intact; Full blisters;Fragile 2019 10:30 AM   [place]%Wound Bed 100 2019 10:30 AM   Red%Wound Bed 50 4/24/2019 10:01 AM   Purple%Wound Bed 50 4/24/2019 10:01 AM   Number of days: 8     Right heel evolving deep tissue pressure injury POA     Right heel evolving deep tissue pressure injury POA     Response to treatment:  Well tolerated by patient. Pain Assessment:  Severity:  0 / 10  Quality of pain: N/A  Wound Pain Timing/Severity: none  Premedicated: No  Plan:   Plan of Care: Incision 04/16/19 Hip Right-Dressing/Treatment: Open to air  Wound 04/23/19 Heel Right-Dressing/Treatment: Barrier Film     1. Implement Mahesh prevention orders. See orders for prevention order details   2. Implement Wound Care orders. See wound care orders for dressing change instructions   Right heel: Apply SurePrep Rapid Dry No Sting Barrier Film wipes to right heel twice a day. Do Not Cover with dressing leave open to air. 3. Will continue to follow   4. Apply Medline Boots (black) to BLE while in bed at all times and/ or when heels resting on surface. (ex. recliner chair foot rest). Check heel placement in heel protector boot every 2 hours   5. Use the Wedge Pillow to reposition the patient while in bed. Keep Patient OFF BACK while in bed   Turn patient to left and right lying positions while in bed     Specialty Bed Required : Yes   [x] Low Air Loss   [] Pressure Redistribution  [] Fluid Immersion  [] Bariatric  [] Total Pressure Relief  [] Other:     Current Diet: DIET DYSPHAGIA I PUREED; Dysphagia I Pureed;  Nectar Thick  Dietician consult:  Yes    Patient/Caregiver Teaching:  Level of patient/caregiver understanding able to: Spoke with bedside nurse Samson Cole about plan of care   [x] Indicates understanding       [x] Needs reinforcement  [] Unsuccessful      [] Verbal Understanding  [] Demonstrated understanding       [] No evidence of learning  [] Refused teaching         [] N/A       Electronically signed by Romero Lawler RN, [id number] on 5/1/2019 at 2:24 PM

## 2019-05-01 NOTE — PROGRESS NOTES
Speech Language Pathology      Speech Therapy note regarding Modified Barium Swallow Orders  · Pt seen briefly. Pt is agreeable to MBSS today. · This SLP discussed with RN    Plan:  MBSS is scheduled 5/1/2019 at 1:00 pm    \[facility]\"" SHAMIKA Hoffmann MS,CCC,SLP [id number]  Speech and Language Pathologist

## 2019-05-02 ENCOUNTER — APPOINTMENT (OUTPATIENT)
Dept: GENERAL RADIOLOGY | Age: 64
DRG: 871 | End: 2019-05-02
Payer: MEDICARE

## 2019-05-02 LAB
ALBUMIN SERPL-MCNC: 1.8 G/DL (ref 3.4–5)
ANION GAP SERPL CALCULATED.3IONS-SCNC: 9 MMOL/L (ref 3–16)
BASOPHILS ABSOLUTE: 0.1 K/UL (ref 0–0.2)
BASOPHILS RELATIVE PERCENT: 1.1 %
BUN BLDV-MCNC: <2 MG/DL (ref 7–20)
CALCIUM SERPL-MCNC: 7.4 MG/DL (ref 8.3–10.6)
CHLORIDE BLD-SCNC: 101 MMOL/L (ref 99–110)
CO2: 26 MMOL/L (ref 21–32)
CREAT SERPL-MCNC: <0.5 MG/DL (ref 0.8–1.3)
EOSINOPHILS ABSOLUTE: 0.7 K/UL (ref 0–0.6)
EOSINOPHILS RELATIVE PERCENT: 9 %
GFR AFRICAN AMERICAN: >60
GFR NON-AFRICAN AMERICAN: >60
GLUCOSE BLD-MCNC: 97 MG/DL (ref 70–99)
HCT VFR BLD CALC: 26.7 % (ref 40.5–52.5)
HEMOGLOBIN: 8.9 G/DL (ref 13.5–17.5)
LYMPHOCYTES ABSOLUTE: 1.5 K/UL (ref 1–5.1)
LYMPHOCYTES RELATIVE PERCENT: 18.7 %
MAGNESIUM: 1.4 MG/DL (ref 1.8–2.4)
MCH RBC QN AUTO: 32.8 PG (ref 26–34)
MCHC RBC AUTO-ENTMCNC: 33.2 G/DL (ref 31–36)
MCV RBC AUTO: 99 FL (ref 80–100)
MONOCYTES ABSOLUTE: 1.1 K/UL (ref 0–1.3)
MONOCYTES RELATIVE PERCENT: 14.2 %
NEUTROPHILS ABSOLUTE: 4.6 K/UL (ref 1.7–7.7)
NEUTROPHILS RELATIVE PERCENT: 57 %
PDW BLD-RTO: 15.2 % (ref 12.4–15.4)
PHOSPHORUS: 3.9 MG/DL (ref 2.5–4.9)
PLATELET # BLD: 291 K/UL (ref 135–450)
PMV BLD AUTO: 10.1 FL (ref 5–10.5)
POTASSIUM SERPL-SCNC: 3.8 MMOL/L (ref 3.5–5.1)
PROCALCITONIN: 0.17 NG/ML (ref 0–0.15)
RBC # BLD: 2.7 M/UL (ref 4.2–5.9)
SODIUM BLD-SCNC: 136 MMOL/L (ref 136–145)
WBC # BLD: 8 K/UL (ref 4–11)

## 2019-05-02 PROCEDURE — 1200000000 HC SEMI PRIVATE

## 2019-05-02 PROCEDURE — 94760 N-INVAS EAR/PLS OXIMETRY 1: CPT

## 2019-05-02 PROCEDURE — 6360000002 HC RX W HCPCS: Performed by: INTERNAL MEDICINE

## 2019-05-02 PROCEDURE — 84145 PROCALCITONIN (PCT): CPT

## 2019-05-02 PROCEDURE — 80069 RENAL FUNCTION PANEL: CPT

## 2019-05-02 PROCEDURE — 6370000000 HC RX 637 (ALT 250 FOR IP): Performed by: NURSE PRACTITIONER

## 2019-05-02 PROCEDURE — 6370000000 HC RX 637 (ALT 250 FOR IP): Performed by: INTERNAL MEDICINE

## 2019-05-02 PROCEDURE — 85025 COMPLETE CBC W/AUTO DIFF WBC: CPT

## 2019-05-02 PROCEDURE — 97530 THERAPEUTIC ACTIVITIES: CPT

## 2019-05-02 PROCEDURE — 2580000003 HC RX 258: Performed by: INTERNAL MEDICINE

## 2019-05-02 PROCEDURE — 6360000002 HC RX W HCPCS: Performed by: NURSE PRACTITIONER

## 2019-05-02 PROCEDURE — 36592 COLLECT BLOOD FROM PICC: CPT

## 2019-05-02 PROCEDURE — 2700000000 HC OXYGEN THERAPY PER DAY

## 2019-05-02 PROCEDURE — 71045 X-RAY EXAM CHEST 1 VIEW: CPT

## 2019-05-02 PROCEDURE — 2500000003 HC RX 250 WO HCPCS: Performed by: INTERNAL MEDICINE

## 2019-05-02 PROCEDURE — 83735 ASSAY OF MAGNESIUM: CPT

## 2019-05-02 PROCEDURE — 97535 SELF CARE MNGMENT TRAINING: CPT

## 2019-05-02 RX ADMIN — AMPICILLIN AND SULBACTAM 3 G: 1; 2 INJECTION, POWDER, FOR SOLUTION INTRAMUSCULAR; INTRAVENOUS at 02:58

## 2019-05-02 RX ADMIN — AMPICILLIN AND SULBACTAM 3 G: 1; 2 INJECTION, POWDER, FOR SOLUTION INTRAMUSCULAR; INTRAVENOUS at 08:38

## 2019-05-02 RX ADMIN — THERA TABS 1 TABLET: TAB at 09:32

## 2019-05-02 RX ADMIN — PHENYTOIN SODIUM 100 MG: 50 INJECTION INTRAMUSCULAR; INTRAVENOUS at 00:03

## 2019-05-02 RX ADMIN — PHENYTOIN SODIUM 100 MG: 50 INJECTION INTRAMUSCULAR; INTRAVENOUS at 21:36

## 2019-05-02 RX ADMIN — Medication 10 ML: at 07:55

## 2019-05-02 RX ADMIN — PHENYTOIN SODIUM 100 MG: 50 INJECTION INTRAMUSCULAR; INTRAVENOUS at 06:31

## 2019-05-02 RX ADMIN — FOLIC ACID 1 MG: 1 TABLET ORAL at 09:32

## 2019-05-02 RX ADMIN — POTASSIUM CHLORIDE, DEXTROSE MONOHYDRATE AND SODIUM CHLORIDE: 150; 5; 450 INJECTION, SOLUTION INTRAVENOUS at 06:31

## 2019-05-02 RX ADMIN — MAGNESIUM SULFATE HEPTAHYDRATE 1 G: 1 INJECTION, SOLUTION INTRAVENOUS at 09:24

## 2019-05-02 RX ADMIN — PHENYTOIN SODIUM 100 MG: 50 INJECTION INTRAMUSCULAR; INTRAVENOUS at 14:25

## 2019-05-02 RX ADMIN — MAGNESIUM SULFATE HEPTAHYDRATE 1 G: 1 INJECTION, SOLUTION INTRAVENOUS at 07:54

## 2019-05-02 RX ADMIN — AMPICILLIN AND SULBACTAM 3 G: 1; 2 INJECTION, POWDER, FOR SOLUTION INTRAMUSCULAR; INTRAVENOUS at 14:25

## 2019-05-02 RX ADMIN — Medication 100 MG: at 09:32

## 2019-05-02 RX ADMIN — AMPICILLIN AND SULBACTAM 3 G: 1; 2 INJECTION, POWDER, FOR SOLUTION INTRAMUSCULAR; INTRAVENOUS at 21:36

## 2019-05-02 RX ADMIN — ENOXAPARIN SODIUM 40 MG: 40 INJECTION SUBCUTANEOUS at 07:54

## 2019-05-02 ASSESSMENT — PAIN SCALES - GENERAL
PAINLEVEL_OUTOF10: 0
PAINLEVEL_OUTOF10: 0

## 2019-05-02 NOTE — PROGRESS NOTES
Hospitalist Progress Note      PCP: Chad Weston MD    Date of Admission: 4/22/2019    Chief Complaint: Dyspnea      Subjective: Intermittent dyspnea. Continues to have copious secretions. Barium yesterday with modified diet. Medications:  Reviewed    Infusion Medications    dextrose 5% and 0.45% NaCl with KCl 20 mEq 75 mL/hr at 05/02/19 0631     Scheduled Medications    ampicillin-sulbactam  3 g Intravenous Q6H    thiamine  100 mg Oral Daily    phenytoin  100 mg Intravenous Q8H    nicotine  1 patch Transdermal Q24H    sodium chloride flush  10 mL Intravenous 2 times per day    sodium chloride flush  10 mL Intravenous 2 times per day    enoxaparin  40 mg Subcutaneous Daily    multivitamin  1 tablet Oral Daily    folic acid  1 mg Oral Daily     PRN Meds: sodium chloride flush, potassium chloride, sodium chloride flush, magnesium sulfate, haloperidol lactate      Intake/Output Summary (Last 24 hours) at 5/2/2019 1255  Last data filed at 5/2/2019 1141  Gross per 24 hour   Intake 3637.5 ml   Output 1000 ml   Net 2637.5 ml       Exam:    /67   Pulse 98   Temp 97.9 °F (36.6 °C) (Oral)   Resp 18   Ht 5' 9\" (1.753 m)   Wt 173 lb 1 oz (78.5 kg)   SpO2 99%   BMI 25.56 kg/m²     Gen/overall appearance: Not in acute distress. Alert. Head: Normocephalic, atraumatic  Eyes: EOMI, no scleral icterus  CVS: regular rate and rhythm, Normal S1S2  Pulm: Coarse breath sounds, no wheezing  Gastrointestinal: Soft, nontender, nondistended, no guarding or rebound  Extremities: R arm sling. L arm swelling and erythema.    Neuro: No gross focal deficits noted  Skin: Warm, dry    Labs:   Recent Labs     04/30/19  0600 05/01/19  0529 05/02/19  0613   WBC 8.9 8.0 8.0   HGB 8.6* 8.8* 8.9*   HCT 25.9* 25.8* 26.7*    300 291     Recent Labs     04/30/19  0600 05/01/19  0529 05/02/19  0613    139 136   K 3.9 3.9 3.8    104 101   CO2 27 27 26   BUN <2* <2* <2*   CREATININE <0.5* <0.5* <0.5* CALCIUM 7.7* 7.4* 7.4*   PHOS 3.3 3.7 3.9     No results for input(s): AST, ALT, BILIDIR, BILITOT, ALKPHOS in the last 72 hours. No results for input(s): INR in the last 72 hours. No results for input(s): Evern Searcy in the last 72 hours. Assessment/Plan:    Active Hospital Problems    Diagnosis Date Noted    Moderate malnutrition ([facility] [address] [E44.0] 04/24/2019    Severe sepsis ([facility] [address] [A41.9, R65.20] 04/23/2019    HAP (hospital-acquired pneumonia) [J18.9]     Septicemia ([facility] [address] [A41.9]     Cellulitis of left upper extremity [L03.114]     Hypokalemia [E87.6]     Leukemoid reaction [D72.823]      Sepsis 2/2 aspiration PNA  Severe dysphagia  Acute metabolic encephalopathy likley sec to above; improved  Acute hypoxic respiratory failure; improved  Acute on chronic macrocytic anemia. No gross signs of bleeding. Possibly hemodilution. S/p 1u PRBCs  L arm swelling with chronic superficial venous thrombosis  Right femoral fracture shaft and subtrochanteric s/p ORIF on 4/16/19   Recent non displaced  Humeral fracture  Alcohol dependence with abuse. No clinical signs of withdrawal at this time  Hx of seizure d/o; on phenytoin    Advance to dysphagia diet per ST recs  C/w course of IV unasyn  Gentle IVF hydration  Respiratory care  Med nebs  PT/OT/OOB    DVT Prophylaxis:  lovenox  Diet: DIET DYSPHAGIA I PUREED; Dysphagia I Pureed;  Nectar Thick  Code Status: Limited    Dispo - Inpt, 2-4 days    Jamilah Schmitt MD

## 2019-05-02 NOTE — PLAN OF CARE
Problem: Falls - Risk of:  Goal: Will remain free from falls  Description  Will remain free from falls  5/2/2019 0803 by Randell Norton RN  Outcome: Ongoing   Pt free from falls this shift. Fall precautions in place at all times. Call light within reach. Pt able to and agreeable to contact for safety appropriately. Problem: Falls - Risk of:  Goal: Absence of physical injury  Description  Absence of physical injury  5/2/2019 0803 by Randell Norton RN  Outcome: Ongoing     Problem: Safety:  Goal: Free from accidental physical injury  Description  Free from accidental physical injury  5/2/2019 0803 by Randell Norton RN  Outcome: Ongoing   Measures were made to prevent accidental needle stick, friction, shear, etc. Environment kept free of clutter and adequate lighting provided. Will continue to monitor for physical injury. Problem: Pain:  Goal: Patient's pain/discomfort is manageable  Description  Patient's pain/discomfort is manageable  5/2/2019 0803 by Randell Norton RN  Outcome: Ongoing   Pain/discomfort being managed with PRN analgesics per MD order. Pt able to express presence and absence of pain and rate pain appropriately using numerical scale    Problem: Skin Integrity:  Goal: Skin integrity will stabilize  Description  Skin integrity will stabilize  5/2/2019 0803 by Randell Norton RN  Outcome: Ongoing   Skin assessment completed every shift. Pt assessed for incontinence, appropriate barrier cream applied prn as needed. Pt encouraged to turn/rotate every 2 hours. Assistance provided if pt unable to do so themselves. Problem: Risk for Impaired Skin Integrity  Goal: Tissue integrity - skin and mucous membranes  Description  Structural intactness and normal physiological function of skin and  mucous membranes.   5/2/2019 0803 by Randell Norton RN  Outcome: Ongoing     Problem: Nutrition  Goal: Optimal nutrition therapy  5/2/2019 0803 by Randell Norton RN  Outcome: Ongoing

## 2019-05-02 NOTE — PROGRESS NOTES
Occupational Therapy Attempt Note  Geneva Mario  [id number]  5/2/2019    With patient x 10 minutes attempting to engage patient in therapy session. Patient adamantly refuses therapy at this time and becomes more herbert with refusal as this writer encourages more. Patient immediately falls back into sound sleep as this writer is reapplying blankets, pillows, etc.  Will continue to follow and treat as able. If patient is d/c prior to next treat, refer to last OT note on 5/2/19 for last status.     Eve Olvera, [address]

## 2019-05-02 NOTE — FLOWSHEET NOTE
Mag 1.4. Protocol orders initiated per [facility]  Pt to receive 2g total.  Electronically signed by Jamir Ch RN on 5/2/2019 at 8:02 AM

## 2019-05-02 NOTE — PROGRESS NOTES
Occupational Therapy  Facility/Department: [address] MED SURG  Daily Treatment Note  NAME: Shamika Callahan  : 1955  MRN: [id number]    Date of Service: 2019    Discharge Recommendations:  3-5 sessions per week       Assessment   Performance deficits / Impairments: Decreased strength;Decreased endurance;Decreased ADL status; Decreased safe awareness;Decreased ROM; Decreased cognition;Decreased balance  Assessment: Pt with decreased engagement in therapy this date and decreased cognitive status. Pt able to complete stand pivot transfer from bed > chair requiring Max A for fxl transfer and bed mobility. Pt remains limited by NWB status on R UE and sling and 50% WB status on R LE. Cont per POC  Prognosis: Fair  Patient Education: Pt educated on POC, role of OT, importance of OOB activity, WB precautions  Barriers to Learning: delayed processing, decreased arousal  REQUIRES OT FOLLOW UP: Yes  Activity Tolerance  Activity Tolerance: Treatment limited secondary to agitation;Patient limited by fatigue;Treatment limited secondary to decreased cognition  Safety Devices  Type of devices: All fall risk precautions in place;Nurse notified;Gait belt;Call light within reach; Chair alarm in place; Left in chair         Patient Diagnosis(es): The primary encounter diagnosis was Septicemia ([facility] [address] Diagnoses of Leukemoid reaction, Hypokalemia, Hypomagnesemia, Cellulitis of left upper extremity, and HAP (hospital-acquired pneumonia) were also pertinent to this visit. has a past medical history of BPH (benign prostatic hyperplasia) and Seizures ([address] has a past surgical history that includes Femur fracture surgery (Right, 2019) and Skull fracture elevation.     Restrictions  Restrictions/Precautions  Restrictions/Precautions: Fall Risk, Weight Bearing  Lower Extremity Weight Bearing Restrictions  Right Lower Extremity Weight Bearing: Partial Weight Bearing(50% WB R LE)  Upper Extremity Weight Bearing Restrictions  Right Upper Extremity Weight Bearing: Non Weight Bearing  Position Activity Restriction  Other position/activity restrictions: R UE NWB immob with Sling (no ROM Exs), R LE 50% Wgt Bear. H/O ETOH. Diet : Dysphagia I, Pureed, Pudding Thick. Subjective   General  Chart Reviewed: Yes  Patient assessed for rehabilitation services?: Yes  Additional Pertinent Hx: HPI initial admission Per Leighton Ash MD 4/14/19: Pt is \"77 y.o. male who presented to [facility] after he had a mechanical fall, admitted to have right hip pain, 10 out of 10 intensity, nonradiating, sharp, worse with any movement, to note that patient is poor historian and he was intoxicated on presentation. Found to have right femoral and right humeral fracture along was 2 broken ribs. Hold on the right side, patient denies nausea, vomiting, abdominal pain or chest pain when examined. Denies any hallucination at this time, drinks on a daily basis. \" 4/23 admitted from [address] [place] [address] w/ [address] [place] [address], HCAP ,encephalopathy,cellulitis  Response to previous treatment: Patient with no complaints from previous session  Family / Caregiver Present: No  Referring Practitioner: Keren Lockwood  Diagnosis: 4/23 admitted from [address] [place] [address] w/ septicemia, HCAP w/aspiration ,encephalopathy,cellulitis, s/p ORIF of R femur fx (50% WB) and conservative tx of R humeral fx (NWB) sling and swath  Subjective  Subjective: Patient met b/s as co-treat with PT, agreeable to OOB, indicated some pain in R LE but does not rate, stated he needs medication for \"itching\"  General Comment  Comments: Did not attempt any EOB  this visit as pt needs assist x 2 at least w/ 2 fx extremeties and 2 fx ribs. Expect pt will be able to assist more as his medical issues resolve. Orientation  Orientation  Overall Orientation Status: Impaired  Orientation Level: Oriented to place; Disoriented to situation;Oriented to person  Objective    ADL  Grooming: Setup; Other (Comment)(pt washed face in sitting with cues and encouragement, poor quality. refused combing hair in sitting)  Additional Comments: pt required max cues to participate in seated grooming task. anticipate set up for feeding, total assist for bathing, total assist for dressing, total assist for toileting based on endurance, strength, balance, and level of arousal this date        Balance  Sitting Balance: Contact guard assistance(pt initially required Mod A for sitting balance EOB but improved to CGA with feet on floor)  Standing Balance: Maximum assistance  Bed mobility  Supine to Sit: Maximum assistance  Scooting: Maximal assistance  Transfers  Stand Pivot Transfers: Maximum assistance(Max A for stand pivot from bed > chair)  Sit to stand: Maximum assistance(to stand from EOB)                       Cognition  Overall Cognitive Status: Exceptions  Arousal/Alertness: Delayed responses to stimuli  Following Commands: Follows one step commands with increased time; Follows one step commands with repetition; Inconsistently follows commands  Attention Span: Attends with cues to redirect  Memory: Decreased recall of recent events(pt unable to remember what brought him to hospital, when/how his injuries occured)  Safety Judgement: Decreased awareness of need for safety  Insights: Decreased awareness of deficits  Initiation: Requires cues for all  Sequencing: Requires cues for some  Cognition Comment: Pt with decreased cognitive status this date, requiring max cues to initiate movement and participate in activities                                         Plan   Plan  Times per week: 3-5  Times per day: Daily  Current Treatment Recommendations: Strengthening, Functional Mobility Training, Endurance Training, Balance Training, Safety Education & Training, Equipment Evaluation, Education, & procurement, Patient/Caregiver Education & Training, Self-Care / ADL, Positioning, ROM                        AM-PAC Score    Sherlyn Mendez scored a 8/24 on the AM-PAC ADL Inpatient form. Current research shows that an AM-PAC score of 17 or less is typically not associated with a discharge to the patient's home setting. Based on the patients AM-PAC score and their current ADL deficits, it is recommended that the patient have 3-5 sessions per week of Occupational Therapy at d/c to increase the patients independence.        AM-PAC Inpatient Daily Activity Raw Score: 8  AM-PAC Inpatient ADL T-Scale Score : 22.86  ADL Inpatient CMS 0-100% Score: 85.69  ADL Inpatient CMS G-Code Modifier : CM    Goals  Short term goals  Time Frame for Short term goals: prior to d/c: status as of 5/2/19: all goals ongoing  Short term goal 1: Pt will complete sit <> stand transfer with mod A  Short term goal 2: Pt will feed self w/ Supervision after set up when swallow improves  Short term goal 3: Pt will complete dressing /bathing with modA  Short term goal 4: Pt will complete toileting with max A  Short term goal 5: Pt will complete grooming with setup  Patient Goals   Patient goals : Pt did not formulate goal at this time ,is aware need for ongoing therapy       Therapy Time   Individual Concurrent Group Co-treatment   Time In       1440   Time Out       1515   Minutes       35   Timed Code Treatment Minutes: Brittni Gerard 104, [address]

## 2019-05-02 NOTE — PROGRESS NOTES
Speech Language Pathology  Dysphagia Followup Attempt #2: Upon arrival, nurse in room and is reporting that pt took only a few bites of pudding with meds this AM and won't take any PO since then, and has been very tired. Pt awoke to respond to simple questions from SLP with very delayed responses, slurred speech, and repeated refusal to trial a variety of pureed textures and liquids offered to him. Pt stated 'come back later, leave me alone,' and became increasingly agitated with SLP cues/prompts/encouragement. Will retry on 5/3.     Saurav Tirado, MS, CCC-SLP #[id number]

## 2019-05-02 NOTE — PLAN OF CARE
Problem: Falls - Risk of:  Goal: Will remain free from falls  Description  Will remain free from falls  5/2/2019 0252 by Pedro Hensley RN  Outcome: Ongoing  Note:   Pt remains free from falls this shift. 5/1/2019 1329 by Curly Sherman RN  Outcome: Ongoing  Note:   Pt free from falls this shift. Fall precautions in place at all times. Call light within reach. Pt able to and agreeable to contact for safety appropriately. Goal: Absence of physical injury  Description  Absence of physical injury  Outcome: Ongoing     Problem: Safety:  Goal: Free from accidental physical injury  Description  Free from accidental physical injury  5/2/2019 0252 by Pedro Hensley RN  Outcome: Ongoing  5/1/2019 1329 by Curly Sherman RN  Note:   Measures were made to prevent accidental needle stick, friction, shear, etc. Environment kept free of clutter and adequate lighting provided. Will continue to monitor for physical injury. Goal: Free from intentional harm  Description  Free from intentional harm  Outcome: Ongoing     Problem: Pain:  Goal: Patient's pain/discomfort is manageable  Description  Patient's pain/discomfort is manageable  5/2/2019 0252 by Pedro Hensley RN  Outcome: Ongoing  5/1/2019 1329 by Curly Sherman RN  Outcome: Ongoing  Note:   Pain/discomfort being managed with PRN analgesics per MD order. Pt able to express presence and absence of pain and rate pain appropriately using numerical scale       Problem: Skin Integrity:  Goal: Skin integrity will stabilize  Description  Skin integrity will stabilize  5/2/2019 0252 by Pedro Hensley RN  Outcome: Ongoing  5/1/2019 1329 by Curly Sherman RN  Outcome: Ongoing  Note:   Skin assessment completed every shift. Pt assessed for incontinence, appropriate barrier cream applied prn as needed. Pt encouraged to turn/rotate every 2 hours. Assistance provided if pt unable to do so themselves.         Problem: Risk for Impaired Skin Integrity  Goal: Tissue integrity

## 2019-05-02 NOTE — PROGRESS NOTES
Physical Therapy    Daily Treatment Note    Patient Name: Evelia Cochran  Medical Record Number: [id number]  Room Number: F4L-1049/4661-67    ASSESSMENT: Today patient was more somnolent but able to waken with maximal encouragement. Pt required Total Assist for bed mobility, Max Assist for stand-pivot transfer bed<>chair. Pt clearly functioning below his baseline at this time. Recommend skilled PT 3-5x/wk at discharge to optimize his functional mobility. Discharge Recommendations: 3-5 sessions per week, Patient would benefit from continued therapy after discharge  Equipment Needs: Equipment Needed: No(Defer to next level of care. )  Other: Defer to next level of care. Admission Date: 4/22/2019  8:09 PM    Additional Pertinent Hx: 71 y/o male return to ER 4/22/19, few hrs after d/c to SNF setting, with Septicemia, Cellulitis L UE.  L UE Dopplers pending. Recent Hospital Admit 4/14-4/22/19 with R Humerus Fx (Medically Manage) and R Femur Fx (ORIF/IM Nail 4/16/19, Dr Marine Denny), 2 Rib Fxs (Displaced L 7th and 9th) following Fall at Home (ETOH). PMH as noted also including H/O Skull Fx/Elevation, ETOH. Diagnosis: Septicemia, Pneumonia, Cellulitis L UE. H/O Recent R Humerus/Femur Fxs and Rib Fxs due to Fall. Restrictions/Precautions: Fall Risk, Weight Bearing Other position/activity restrictions: R UE NWB immob with Sling (no ROM Exs), R LE 50% Wgt Bear. H/O ETOH. Diet : Dysphagia I, Pureed, Pudding Thick. PMHx:  has a past medical history of BPH (benign prostatic hyperplasia) and Seizures ([address]   PSgHx:   Past Surgical History:   Procedure Laterality Date    FEMUR FRACTURE SURGERY Right 4/16/2019    INTRAMEDULLARY NAILING RIGHT FEMUR performed by Jon Jerez MD at [facility] / Functional History  Social/Functional History  Lives With: Alone  Type of Home: Mobile home  Home Layout: One level  Home Access: Stairs to enter with rails  Entrance Stairs - Number of Steps: 2 SANG per pt(?)  Bathroom Toilet: Standard  ADL Assistance: Independent  Homemaking Assistance: Independent  Ambulation Assistance: Independent  Transfer Assistance: Independent  Active : Yes  Additional Comments: Pt able to provide above information to PT, unsure extent of accuracy. PTA pt d/c from acute care to SNF setting. Per SS note pt's hx of PLOF was correct according to his brother. Pt's brother also reports pt has been some what  reclusive and has drank ETOH since age 13. Restrictions  Restrictions/Precautions: Fall Risk, Weight Bearing  Right Lower Extremity Weight Bearing: Partial Weight Bearing(50% WB R LE)  Right Upper Extremity Weight Bearing: Non Weight Bearing  Other position/activity restrictions: R UE NWB immob with Sling (no ROM Exs), R LE 50% Wgt Bear. H/O ETOH. Diet : Dysphagia I, Pureed, Pudding Thick. SUBJECTIVE: Pt supine in bed sleeping but wakens to name. Pt initially adamantly declining to get out of bed. Agreeable with total assist to edge of bed. Pt c/o unrated right hip pain and itching over his back. Pt confused/lethargic. not following cues for combing his hair. Pain: Patient Currently in Pain: Denies      OBJECTIVE:    OBSERVATIONS  Observation: Patient's sling forward to wrist and patient with no swath on. Bed mobility  Rolling to Left: Unable to assess  Rolling to Right: Unable to assess  Supine to Sit: Maximum assistance  Sit to Supine: Unable to assess  Scooting: Maximal assistance    Transfers  Sit to Stand: Maximum Assistance(NWB on R UE, 50% WB R LE)  Stand to sit: Maximum Assistance(NWB on R UE, 50% WB R LE)  Bed to Chair: Moderate assistance, 2 Person Assistance(via stand-pivot to pt's Left side, cues for techniqe and for sequencing task.   Pt seemed to maintain 50% WB R LE for transfer.  )  Stand Pivot Transfers: Maximum Assistance(NWB on R UE, 50% WB R LE)    Ambulation  Ambulation  Ambulation?: No(non-ambulatory at this time)    Stairs  Stairs/Curb  Stairs?: No    Exercises   SEATED: LAQ x 10 reps. Other Activities:  Comment: LE's elevated onto pillow at end of session. Call light & needs in reach. Neuro/balance  Balance  Sitting - Static: Fair(initially Mod Assist sitting up with feet not down on ground, progressed to CGA once both feet on ground -- ~4-5 minutes.)  Standing - Static: Poor(Max Assist to stand with no device)  Comments: . Patient Education: Role of PT, POC, Need to call for assist.        Safety Devices: Type of devices: Call light within reach, Nurse notified, Patient at risk for falls, Left in chair, Chair alarm in place, All fall risk precautions in place, Telesitter in use      ASSESSMENT:   Today patient was more somnolent but able to waken with maximal encouragement. Pt required Total Assist for bed mobility, Max Assist for stand-pivot transfer bed<>chair. Pt clearly functioning below his baseline at this time. Recommend skilled PT 3-5x/wk at discharge to optimize his functional mobility. Hao Shin scored a 12/24 on the AM-PAC short mobility form. Initial research suggests that an AM-PAC score of 18 or greater may be associated with a discharge to patient's home setting. However in this initial research, cut off scores do not perfectly predict discharge location: 25% of patients with a score of 18 or greater actually went to a rehab facility and 20% of people with a score less than 18 actually went home. Based on my clinical judgement I recommend that the patient have 3-5 sessions per week of Physical Therapy at d/c to increase the patients independence.     Goals:  Time Frame for Short term goals: Upon d/c acute care setting. --goals updated & revised as below, 5/1/19  - all goals ongoing 05/02/19 except as noted below  Short term goal 1: bed mobility sup<>sit with SBA   Short term goal 2: Transfer to/from chair via stand-pivot and equipt/assist device as able, NWB R UE/50% WB R LE, with Min A x 1-2   Short term goal 3: Pt will stand x 60 seconds with L UE support alone and SBA/CGA            Safety devices:   Type of devices: Call light within reach, Nurse notified, Patient at risk for falls, Left in chair, Chair alarm in place, All fall risk precautions in place, Telesitter in use        PLAN:  Times per week: 3-5x week while in acute care setting. ;    Current Treatment Recommendations: Strengthening, Functional Mobility Training, Transfer Training, Safety Education & Training, Patient/Caregiver Education & Training    If patient is discharged prior to next treatment, this note will serve as the discharge summary.     Therapy Time    Individual Concurrent Group Co-treatment   Time In        1440   Time Out      1515   Minutes        35    Timed Code Treatment Minutes: [address]  [id number], PT

## 2019-05-03 ENCOUNTER — APPOINTMENT (OUTPATIENT)
Dept: GENERAL RADIOLOGY | Age: 64
DRG: 871 | End: 2019-05-03
Payer: MEDICARE

## 2019-05-03 PROBLEM — E43 SEVERE MALNUTRITION (HCC): Chronic | Status: ACTIVE | Noted: 2019-05-03

## 2019-05-03 LAB
ALBUMIN SERPL-MCNC: 1.8 G/DL (ref 3.4–5)
ANION GAP SERPL CALCULATED.3IONS-SCNC: 8 MMOL/L (ref 3–16)
BANDED NEUTROPHILS RELATIVE PERCENT: 6 % (ref 0–7)
BASOPHILS ABSOLUTE: 0 K/UL (ref 0–0.2)
BASOPHILS RELATIVE PERCENT: 0 %
BUN BLDV-MCNC: <2 MG/DL (ref 7–20)
CALCIUM SERPL-MCNC: 7.7 MG/DL (ref 8.3–10.6)
CHLORIDE BLD-SCNC: 103 MMOL/L (ref 99–110)
CO2: 26 MMOL/L (ref 21–32)
CREAT SERPL-MCNC: <0.5 MG/DL (ref 0.8–1.3)
EOSINOPHILS ABSOLUTE: 0.3 K/UL (ref 0–0.6)
EOSINOPHILS RELATIVE PERCENT: 4 %
GFR AFRICAN AMERICAN: >60
GFR NON-AFRICAN AMERICAN: >60
GLUCOSE BLD-MCNC: 176 MG/DL (ref 70–99)
HCT VFR BLD CALC: 27.9 % (ref 40.5–52.5)
HEMOGLOBIN: 9.5 G/DL (ref 13.5–17.5)
LYMPHOCYTES ABSOLUTE: 0.9 K/UL (ref 1–5.1)
LYMPHOCYTES RELATIVE PERCENT: 10 %
MAGNESIUM: 1.5 MG/DL (ref 1.8–2.4)
MCH RBC QN AUTO: 33.9 PG (ref 26–34)
MCHC RBC AUTO-ENTMCNC: 34 G/DL (ref 31–36)
MCV RBC AUTO: 99.7 FL (ref 80–100)
METAMYELOCYTES RELATIVE PERCENT: 2 %
MONOCYTES ABSOLUTE: 0.8 K/UL (ref 0–1.3)
MONOCYTES RELATIVE PERCENT: 9 %
MYELOCYTE PERCENT: 3 %
NEUTROPHILS ABSOLUTE: 6.5 K/UL (ref 1.7–7.7)
NEUTROPHILS RELATIVE PERCENT: 66 %
PDW BLD-RTO: 14.8 % (ref 12.4–15.4)
PHOSPHORUS: 4.1 MG/DL (ref 2.5–4.9)
PLATELET # BLD: 336 K/UL (ref 135–450)
PLATELET SLIDE REVIEW: ADEQUATE
PMV BLD AUTO: 9.8 FL (ref 5–10.5)
POTASSIUM SERPL-SCNC: 4.7 MMOL/L (ref 3.5–5.1)
PROCALCITONIN: 0.17 NG/ML (ref 0–0.15)
RBC # BLD: 2.8 M/UL (ref 4.2–5.9)
RBC # BLD: NORMAL 10*6/UL
SLIDE REVIEW: ABNORMAL
SODIUM BLD-SCNC: 137 MMOL/L (ref 136–145)
WBC # BLD: 8.5 K/UL (ref 4–11)

## 2019-05-03 PROCEDURE — 6360000002 HC RX W HCPCS: Performed by: INTERNAL MEDICINE

## 2019-05-03 PROCEDURE — 6370000000 HC RX 637 (ALT 250 FOR IP)

## 2019-05-03 PROCEDURE — 36569 INSJ PICC 5 YR+ W/O IMAGING: CPT

## 2019-05-03 PROCEDURE — 6360000002 HC RX W HCPCS: Performed by: NURSE PRACTITIONER

## 2019-05-03 PROCEDURE — 93971 EXTREMITY STUDY: CPT

## 2019-05-03 PROCEDURE — 83735 ASSAY OF MAGNESIUM: CPT

## 2019-05-03 PROCEDURE — 36415 COLL VENOUS BLD VENIPUNCTURE: CPT

## 2019-05-03 PROCEDURE — 2580000003 HC RX 258: Performed by: INTERNAL MEDICINE

## 2019-05-03 PROCEDURE — C1751 CATH, INF, PER/CENT/MIDLINE: HCPCS

## 2019-05-03 PROCEDURE — 85025 COMPLETE CBC W/AUTO DIFF WBC: CPT

## 2019-05-03 PROCEDURE — 1200000000 HC SEMI PRIVATE

## 2019-05-03 PROCEDURE — 71045 X-RAY EXAM CHEST 1 VIEW: CPT

## 2019-05-03 PROCEDURE — 6370000000 HC RX 637 (ALT 250 FOR IP): Performed by: INTERNAL MEDICINE

## 2019-05-03 PROCEDURE — 80069 RENAL FUNCTION PANEL: CPT

## 2019-05-03 PROCEDURE — 92526 ORAL FUNCTION THERAPY: CPT

## 2019-05-03 PROCEDURE — 94760 N-INVAS EAR/PLS OXIMETRY 1: CPT

## 2019-05-03 PROCEDURE — 02HV33Z INSERTION OF INFUSION DEVICE INTO SUPERIOR VENA CAVA, PERCUTANEOUS APPROACH: ICD-10-PCS | Performed by: INTERNAL MEDICINE

## 2019-05-03 PROCEDURE — 97530 THERAPEUTIC ACTIVITIES: CPT

## 2019-05-03 PROCEDURE — 2500000003 HC RX 250 WO HCPCS: Performed by: INTERNAL MEDICINE

## 2019-05-03 PROCEDURE — 84145 PROCALCITONIN (PCT): CPT

## 2019-05-03 PROCEDURE — 76937 US GUIDE VASCULAR ACCESS: CPT

## 2019-05-03 PROCEDURE — 6370000000 HC RX 637 (ALT 250 FOR IP): Performed by: NURSE PRACTITIONER

## 2019-05-03 RX ORDER — MAGNESIUM SULFATE IN WATER 40 MG/ML
2 INJECTION, SOLUTION INTRAVENOUS ONCE
Status: COMPLETED | OUTPATIENT
Start: 2019-05-03 | End: 2019-05-03

## 2019-05-03 RX ORDER — HYDROCODONE BITARTRATE AND ACETAMINOPHEN 5; 325 MG/1; MG/1
TABLET ORAL
Status: COMPLETED
Start: 2019-05-03 | End: 2019-05-03

## 2019-05-03 RX ORDER — SODIUM CHLORIDE 0.9 % (FLUSH) 0.9 %
10 SYRINGE (ML) INJECTION EVERY 12 HOURS SCHEDULED
Status: DISCONTINUED | OUTPATIENT
Start: 2019-05-03 | End: 2019-05-07 | Stop reason: HOSPADM

## 2019-05-03 RX ORDER — LIDOCAINE HYDROCHLORIDE 10 MG/ML
5 INJECTION, SOLUTION EPIDURAL; INFILTRATION; INTRACAUDAL; PERINEURAL ONCE
Status: DISCONTINUED | OUTPATIENT
Start: 2019-05-03 | End: 2019-05-07 | Stop reason: HOSPADM

## 2019-05-03 RX ORDER — SODIUM CHLORIDE 0.9 % (FLUSH) 0.9 %
10 SYRINGE (ML) INJECTION PRN
Status: DISCONTINUED | OUTPATIENT
Start: 2019-05-03 | End: 2019-05-07 | Stop reason: HOSPADM

## 2019-05-03 RX ORDER — HYDROCODONE BITARTRATE AND ACETAMINOPHEN 5; 325 MG/1; MG/1
1 TABLET ORAL EVERY 6 HOURS PRN
Status: DISCONTINUED | OUTPATIENT
Start: 2019-05-03 | End: 2019-05-07 | Stop reason: HOSPADM

## 2019-05-03 RX ADMIN — POTASSIUM CHLORIDE, DEXTROSE MONOHYDRATE AND SODIUM CHLORIDE: 150; 5; 450 INJECTION, SOLUTION INTRAVENOUS at 13:16

## 2019-05-03 RX ADMIN — PHENYTOIN SODIUM 100 MG: 50 INJECTION INTRAMUSCULAR; INTRAVENOUS at 13:32

## 2019-05-03 RX ADMIN — Medication 10 ML: at 20:37

## 2019-05-03 RX ADMIN — PHENYTOIN SODIUM 100 MG: 50 INJECTION INTRAMUSCULAR; INTRAVENOUS at 20:39

## 2019-05-03 RX ADMIN — Medication 10 ML: at 10:13

## 2019-05-03 RX ADMIN — AMPICILLIN AND SULBACTAM 3 G: 1; 2 INJECTION, POWDER, FOR SOLUTION INTRAMUSCULAR; INTRAVENOUS at 10:19

## 2019-05-03 RX ADMIN — HYDROCODONE BITARTRATE AND ACETAMINOPHEN 1 TABLET: 5; 325 TABLET ORAL at 20:37

## 2019-05-03 RX ADMIN — THERA TABS 1 TABLET: TAB at 10:11

## 2019-05-03 RX ADMIN — HYDROCODONE BITARTRATE AND ACETAMINOPHEN 1 TABLET: 5; 325 TABLET ORAL at 12:32

## 2019-05-03 RX ADMIN — Medication 100 MG: at 10:10

## 2019-05-03 RX ADMIN — MAGNESIUM SULFATE HEPTAHYDRATE 2 G: 40 INJECTION, SOLUTION INTRAVENOUS at 17:21

## 2019-05-03 RX ADMIN — FOLIC ACID 1 MG: 1 TABLET ORAL at 10:11

## 2019-05-03 RX ADMIN — PHENYTOIN SODIUM 100 MG: 50 INJECTION INTRAMUSCULAR; INTRAVENOUS at 07:42

## 2019-05-03 RX ADMIN — AMPICILLIN AND SULBACTAM 3 G: 1; 2 INJECTION, POWDER, FOR SOLUTION INTRAMUSCULAR; INTRAVENOUS at 13:43

## 2019-05-03 RX ADMIN — ENOXAPARIN SODIUM 40 MG: 40 INJECTION SUBCUTANEOUS at 10:11

## 2019-05-03 RX ADMIN — AMPICILLIN AND SULBACTAM 3 G: 1; 2 INJECTION, POWDER, FOR SOLUTION INTRAMUSCULAR; INTRAVENOUS at 20:29

## 2019-05-03 RX ADMIN — AMPICILLIN AND SULBACTAM 3 G: 1; 2 INJECTION, POWDER, FOR SOLUTION INTRAMUSCULAR; INTRAVENOUS at 02:30

## 2019-05-03 ASSESSMENT — PAIN DESCRIPTION - ORIENTATION
ORIENTATION: LEFT
ORIENTATION: LEFT

## 2019-05-03 ASSESSMENT — PAIN DESCRIPTION - PROGRESSION: CLINICAL_PROGRESSION: NOT CHANGED

## 2019-05-03 ASSESSMENT — PAIN DESCRIPTION - FREQUENCY: FREQUENCY: INTERMITTENT

## 2019-05-03 ASSESSMENT — PAIN SCALES - GENERAL
PAINLEVEL_OUTOF10: 6
PAINLEVEL_OUTOF10: 7
PAINLEVEL_OUTOF10: 8
PAINLEVEL_OUTOF10: 7

## 2019-05-03 ASSESSMENT — PAIN - FUNCTIONAL ASSESSMENT: PAIN_FUNCTIONAL_ASSESSMENT: PREVENTS OR INTERFERES SOME ACTIVE ACTIVITIES AND ADLS

## 2019-05-03 ASSESSMENT — PAIN DESCRIPTION - PAIN TYPE
TYPE: ACUTE PAIN
TYPE: ACUTE PAIN

## 2019-05-03 ASSESSMENT — PAIN DESCRIPTION - LOCATION
LOCATION: ARM
LOCATION: ARM

## 2019-05-03 ASSESSMENT — PAIN DESCRIPTION - ONSET: ONSET: ON-GOING

## 2019-05-03 ASSESSMENT — PAIN DESCRIPTION - DESCRIPTORS
DESCRIPTORS: ACHING;DISCOMFORT
DESCRIPTORS: ACHING

## 2019-05-03 NOTE — PROGRESS NOTES
Pt agreeable to wear sling for Right arm; sling in place. Will continue to monitor.  Electronically signed by Flor De La Vega RN on 5/3/2019 at 11:32 AM

## 2019-05-03 NOTE — PROGRESS NOTES
[facility]   Speech Therapy  Daily Dysphagia Treatment Note     Babak Mehta  AGE: 70 y.o. GENDER: male  : 1955  [id number]  EPISODE DATE:  2019         Patient Active Problem List   Diagnosis    Fall    Right humeral fracture    Right femoral fracture (HCC)    Alcohol intoxication ([address]    Hypomagnesemia    [facility]    Closed fracture of neck of right femur [facility]    Alcohol dependence ([facility]    Multiple rib fractures    Syncope    Shortness of breath    Bronchitis    Tobacco abuse    ETOH abuse    Severe sepsis (HCC)    HAP (hospital-acquired pneumonia)    Septicemia (HCC)    Cellulitis of left upper extremity    Hypokalemia    Leukemoid reaction    Moderate malnutrition (HCC)      No Known Allergies     Treatment Diagnosis: Dysphagia      Remote Chart Review:  4/15/2019 CXR:  Impression   Unchanged left-sided rib fractures.  No underlying pneumothorax or pleural   effusion.       Suspect lower right-sided rib fractures, age indeterminate.          2019 MBS: Moderate oral stage dysphagia characterized by decreased mastication and decreased lingual manipulation which are both compounded by lethargy.  Prolonged mastication with solids with concern for excess labor with regular solids which were not administered d/t concern for excess labor.  Prolonged, disorganized bolus formation and movement with all trials with reduced bolus cohesion and control and premature bolus loss to the pharynx.  Oral residue with all trials is minimized with cued repeat swallow.  Moderate pharyngeal stage dysphagia characterized by delayed swallow, decreased laryngeal elevation, decreased pharyngeal peristalsis.  Premature spillage to the valleculae with overflow to the hypopharynx with all trials.  Vallecular and pyriform residue with all trials is minimized with cued repeat swallow.  Flash penetration with thin.  Suspect significantly improved swallow function as mental status improves. 4/21/2019 Hospitalist documented hospital course:  63 y. o. male with a past medical history of alcohol dependence and withdrawal seizures  who presented to [facility] he had a mechanical fall. Patient was intoxicated on admission. He was admitted with a right femoral shaft fracture along with humerus fracture and rib fracture. Patient underwent ORIF on 4/16/2019. Hospital course was complicated by delirium tremens and acute blood loss anemia along with bronchitis and possible aspiration pneumonia. 05/01/19 MBS: Oral Phase: Moderate oral stage dysphagia characterized by decreased mastication and decreased lingual manipulation which are both compounded by lethargy. Prolonged mastication with solids with concern for excess labor with solids. Prolonged, disorganized bolus formation and movement with all trials with reduced bolus cohesion and control and premature bolus loss to the pharynx. Oral residue with all trials is minimized with cued repeat swallow.     Pharyngeal: Moderate pharyngeal stage dysphagia characterized by delayed swallow, decreased laryngeal elevation, decreased pharyngeal peristalsis. Premature spillage to the valleculae with overflow to the hypopharynx with all trials. Vallecular and pyriform residue with all trials is minimized with cued repeat swallow. Deep penetration with thin- cup and thin-straw. D/t the amount of residue that builds up in the pyriforms throughout study, patient is at an increased risk for penetration/aspiration of residue after the swallow.     Upper Esophageal Screen: KOKI    Recommended Diet:  Solid consistency: Dysphagia I Pureed  Liquid consistency: Nectar  Medication administration: PO         Chart Review:   04/22/2019: Re-admitted to hospital with AMS. Patient was just discharged today. Per SNF patient has been confused and found to have redness to his left arm. Significant cough productive of sputum as well.  Patient recently discharged for ORIF after femoral fracture.  Started on abx for bronchitis. Hx ETOH abuse with confusion. Currently confused. No other associated symptoms. 4/24/2019 BSE completed. Puree/pudding rec  4/25/2019 puree/honey rec  4/28/2019 Re-eval orders for swallowing received and patient downgraded to NPO  4/30/2019 patient declined MBS     Subjective:      Current Diet : Puree  Current Liquid Diet : Nectar     Comments regarding tolerating Current Diet:   RN reports minimal intake. Wet/congested vocal quality noted upon SLP arrival.      Objective:      Pain   Patient Currently in Pain: Denies     Cognitive/Behavior   Behavior/Cognition: Alert with eyes open entire session, Cooperative, Confused, Requires cueing, Verbally responsive with minimal delay    Positioning   Upright in bed     PO Trials:  · Puree     Dysphagia Tx:   Dysphagia therapy today targeted assessment of diet tolerance for recommended diet. Pt refusing the majority of PO intake with congested/wet vocal quality noted throughout. Pt consumed x3 trials of mashed potatoes and x1 trial of puree with crushed meds. Pt refused any further intake. AP transit is delayed and s/s of premature bolus loss to pharynx prior to swallow are noted. Congested delayed cough present.      Goals: The patient will tolerate recommended diet without observed clinical signs of aspiration, ongoing  The patient/caregiver will demonstrate understanding of compensatory strategies for improved swallowing safety. , ongoing  The patient will improve oral motor function via therapeutic oral motor exercises to 5/5 each trial., not addressed this date  The patient will tolerate nectar thick liquids without signs and symptoms of aspiration 10/10 via cup. ongoing     Assessment:   Impressions:   Dysphagia Diagnosis: Severe dysphagia  · Pt minimally cooperative with acceptance of PO  · Continues to show decreased pharyngeal clearing with pureed solids  · Continue cuing for upright positioning and clearance of congestion  · Continue to encourage PO intake     Diet Recommendations:  Dysphagia I (puree) Diet with Nectar thick liquids, meds crushed in puree  Strategies:   Routine oral care     Education:  Patient Education: Completed on recommendations/plan  Patient Education Response: Verbalizes understanding, Needs reinforcement     Prognosis:   Guarded for safe PO tolerance     Plan:      Continue Dysphagia Therapy: YES     Interventions: Therapeutic Interventions: Therapeutic PO trials with SLP, Diet tolerance monitoring, Laryngeal exercises, Pharyngeal exercises, Oral motor exercises  Duration/Frequency of therapy while on unit: Duration/Frequency of Treatment  Duration/Frequency of Treatment: 3-5x/wk while on acute unit     Discharge Instructions:   Anticipate Yes for further skilled Speech Therapy for Dysphagia at discharge     This note serves as a D/C Summary in the event that this patient is discharged prior to the next therapy session.     Coded treatment time:0  Total treatment time: [address] LOJA MA CCC-SLP #[id number]  Speech Language Pathologist

## 2019-05-03 NOTE — PROGRESS NOTES
Physical Therapy  Co-Treat with [facility]  Daily Treatment Note    Patient Name: Abdias Urban  Medical Record Number: [id number]  Room Number: W0U-1919/1325-39    ASSESSMENT: Pt continues to be disagreeable to mobility. Pt was able to get to edge of bed with Max Assist; unable to stand due to c/o left wrist pain -- able to clear buttock and scoot up in bed with Max Assist x 2 person. Recommend continued skilled PT 3-5x/wk while in the hospital.    Discharge Recommendations: 3-5 sessions per week, Patient would benefit from continued therapy after discharge  Equipment Needs: Equipment Needed: No(Defer to next level of care. )  Other: Defer to next level of care. Admission Date: 4/22/2019  8:09 PM    Additional Pertinent Hx: 71 y/o male return to ER 4/22/19, few hrs after d/c to SNF setting, with Septicemia, Cellulitis L UE.  L UE Dopplers pending. Recent Hospital Admit 4/14-4/22/19 with R Humerus Fx (Medically Manage) and R Femur Fx (ORIF/IM Nail 4/16/19, Dr Chio Bergman), 2 Rib Fxs (Displaced L 7th and 9th) following Fall at Home (ETOH). PMH as noted also including H/O Skull Fx/Elevation, ETOH. Diagnosis: Septicemia, Pneumonia, Cellulitis L UE. H/O Recent R Humerus/Femur Fxs and Rib Fxs due to Fall. Restrictions/Precautions: Fall Risk, Weight Bearing Other position/activity restrictions: R UE NWB immob with Sling (no ROM Exs), R LE 50% Wgt Bear. H/O ETOH. Diet : Dysphagia I, Pureed, Pudding Thick. PMHx:  has a past medical history of BPH (benign prostatic hyperplasia) and Seizures ([address]   PSgHx:   Past Surgical History:   Procedure Laterality Date    FEMUR FRACTURE SURGERY Right 4/16/2019    INTRAMEDULLARY NAILING RIGHT FEMUR performed by Franco Ferrell MD at [address] [place] [address] / Functional History  Social/Functional History  Lives With: Alone  Type of Home: Mobile home  Home Layout: One level  Home Access: Stairs to enter with rails  Entrance Stairs - Number of Steps: 2 SANG per pt(?)  Bathroom Toilet: Standard  ADL Assistance: Independent  Homemaking Assistance: Independent  Ambulation Assistance: Independent  Transfer Assistance: Independent  Active : Yes  Additional Comments: Pt able to provide above information to PT, unsure extent of accuracy. PTA pt d/c from acute care to SNF setting. Per SS note pt's hx of PLOF was correct according to his brother. Pt's brother also reports pt has been some what  reclusive and has drank ETOH since age 13. Restrictions  Restrictions/Precautions: Fall Risk, Weight Bearing  Right Lower Extremity Weight Bearing: Partial Weight Bearing(50% WB R LE)  Right Upper Extremity Weight Bearing: Non Weight Bearing  Other position/activity restrictions: R UE NWB immob with Sling (no ROM Exs), R LE 50% Wgt Bear. H/O ETOH. Diet : Dysphagia I, Pureed, Pudding Thick. SUBJECTIVE: Pt supine in bed with RN in room providing care. Pt c/o uncontrolled pain in left wrist. Adamantly declines any mobility, but able to participate with assist. Pt adamantly declines any attempt to use left arm. Pain: Patient Currently in Pain: Yes      OBJECTIVE:    OBSERVATIONS  Observation: Patient's sling forward to wrist and patient with no swath on. Bed mobility  Rolling to Left: Unable to assess  Rolling to Right: Unable to assess  Supine to Sit: Dependent/Total  Sit to Supine: Maximum assistance  Scooting: Maximal assistance    Transfers  Sit to Stand: Unable to assess  Stand to sit: Unable to assess  Bed to Chair: Unable to assess  Stand Pivot Transfers: Unable to assess    Ambulation  Ambulation  Ambulation?: No    Stairs  Stairs/Curb  Stairs?: No    Other Activities:  Comment: LE's elevated onto pillow at end of session. Call light & needs in reach.       Neuro/balance  Balance  Sitting - Static: Fair(initially Mod Assist sitting up with feet not down on ground, progressed to CGA once both feet on ground -- ~4-5 minutes.)  Standing - Static: Poor(Max Assist to stand with no device)  Comments: . Patient Education: Role of PT, POC, Need to call for assist.        Safety Devices: Type of devices: Call light within reach, Nurse notified, Patient at risk for falls, Left in chair, Chair alarm in place, All fall risk precautions in place, Telesitter in use      ASSESSMENT:   Pt continues to be disagreeable to mobility. Pt was able to get to edge of bed with Max Assist; unable to stand due to c/o left wrist pain -- able to clear buttock and scoot up in bed with Max Assist x 2 person. Recommend continued skilled PT 3-5x/wk while in the hospital.    Michelle Giang scored a 12/24 on the AM-PAC short mobility form. Initial research suggests that an AM-PAC score of 18 or greater may be associated with a discharge to patient's home setting. However in this initial research, cut off scores do not perfectly predict discharge location: 25% of patients with a score of 18 or greater actually went to a rehab facility and 20% of people with a score less than 18 actually went home. Based on my clinical judgement I recommend that the patient have 3-5 sessions per week of Physical Therapy at d/c to increase the patients independence.     Goals:  Time Frame for Short term goals: Upon d/c acute care setting. --goals updated & revised as below, 5/1/19  - all goals ongoing 05/03/19 except as noted below  Short term goal 1: bed mobility sup<>sit with SBA   Short term goal 2: Transfer to/from chair via stand-pivot and equipt/assist device as able, NWB R UE/50% WB R LE, with Min A x 1-2   Short term goal 3: Pt will stand x 60 seconds with L UE support alone and SBA/CGA            Safety devices:   Type of devices: Call light within reach, Nurse notified, Patient at risk for falls, Left in chair, Chair alarm in place, All fall risk precautions in place, Telesitter in use        PLAN:  Times per week: 3-5x week while in acute care setting. ; Current Treatment Recommendations: Strengthening, Functional Mobility Training, Transfer Training, Safety Education & Training, Patient/Caregiver Education & Training    If patient is discharged prior to next treatment, this note will serve as the discharge summary.     Therapy Time    Individual Concurrent Group Co-treatment   Time In        1038   Time Out      1110   Minutes        32    Timed Code Treatment Minutes: 28 Minutes      Reji Bullock, PT

## 2019-05-03 NOTE — PROGRESS NOTES
Hospitalist Progress Note      PCP: Guillermo Viera MD    Date of Admission: 4/22/2019    Chief Complaint: Dyspnea      Subjective: Intermittent dyspnea. Continues to have copious secretions. Poor historian at times. Pulled out PICC overnight. Medications:  Reviewed    Infusion Medications    dextrose 5% and 0.45% NaCl with KCl 20 mEq 75 mL/hr at 05/03/19 1316     Scheduled Medications    magnesium sulfate  2 g Intravenous Once    lidocaine 1 % injection  5 mL Intradermal Once    sodium chloride flush  10 mL Intravenous 2 times per day    ampicillin-sulbactam  3 g Intravenous Q6H    thiamine  100 mg Oral Daily    phenytoin  100 mg Intravenous Q8H    nicotine  1 patch Transdermal Q24H    sodium chloride flush  10 mL Intravenous 2 times per day    sodium chloride flush  10 mL Intravenous 2 times per day    enoxaparin  40 mg Subcutaneous Daily    multivitamin  1 tablet Oral Daily    folic acid  1 mg Oral Daily     PRN Meds: HYDROcodone 5 mg - acetaminophen, sodium chloride flush, sodium chloride flush, potassium chloride, sodium chloride flush, magnesium sulfate, haloperidol lactate      Intake/Output Summary (Last 24 hours) at 5/3/2019 1431  Last data filed at 5/3/2019 1200  Gross per 24 hour   Intake 686.25 ml   Output 1450 ml   Net -763.75 ml       Exam:    /78   Pulse 80   Temp 97.8 °F (36.6 °C) (Oral)   Resp 18   Ht 5' 9\" (1.753 m)   Wt 180 lb 1.9 oz (81.7 kg)   SpO2 90%   BMI 26.60 kg/m²     Gen/overall appearance: Not in acute distress. Alert. Head: Normocephalic, atraumatic  Eyes: EOMI, no scleral icterus  CVS: regular rate and rhythm, Normal S1S2  Pulm: Coarse breath sounds, no wheezing  Gastrointestinal: Soft, nontender, nondistended, no guarding or rebound  Extremities: R arm sling. L arm swelling and erythema.    Neuro: No gross focal deficits noted  Skin: Warm, dry    Labs:   Recent Labs     05/01/19  0529 05/02/19  0613 05/03/19  0832   WBC 8.0 8.0 8.5   HGB 8.8*

## 2019-05-03 NOTE — PROGRESS NOTES
Occupational Therapy Daily Note  Angy Britton  [id number]  5/3/2019    Patient met b/s, working with PT. This writer assisted. Patient already sitting EOB, yet limited by left wrist pain with patient not being able to push through left UE and remains NWB on right UE. Patient completes side scooting towards HOB with Max A x 2. Once supine, patient dependent x 2 to position towards HOB. Patient demanding with set up once supine. Patient left in bed with bed alarm on and call light in reach. Cont per POC. DANA Cohen/L [id number]    AM-PAC  Angy Britton scored a 9/24 on the AM-PAC ADL Inpatient form. Current research shows that an AM-PAC score of 17 or less is typically not associated with a discharge to the patient's home setting. Based on the patients AM-PAC score and their current ADL deficits, it is recommended that the patient have 3-5 sessions per week of Occupational Therapy at d/c to increase the patients independence.     Therapy Time     Individual Co-treatment   Time In  1100   Time Out  1111   Minutes  11

## 2019-05-03 NOTE — PROGRESS NOTES
Nutrition Assessment    Type and Reason for Visit: Reassess    Nutrition Recommendations:   Dysphagia l, NTL per MBS. Start Ensure TID (thicken to nectar). Start Magic Cup BID. Nutrition Assessment: Pt continues with poor PO intake since admit. Pt's diet downgraded to pureed texture per MBS results which could be further reason for continued poor PO intakes. Pt remains at risk for nutrition compromise d/t increased needs, altered mentation, altered GI, fracture, ETOH abuse, Werneckes syndrome with baseline confusion and increased risk for inability to sense hunger cues. Family doesn't want TF at this time. Will restart supplements. Malnutrition Assessment:  · Malnutrition Status: Meets the criteria for severe malnutrition  · Context: Acute illness or injury  · Findings of the 6 clinical characteristics of malnutrition (Minimum of 2 out of 6 clinical characteristics is required to make the diagnosis of moderate or severe Protein Calorie Malnutrition based on AND/ASPEN Guidelines):  1. Energy Intake-Less than or equal to 50% of estimated energy requirement, Greater than or equal to 7 days    2. Weight Loss-7.5% loss or greater, (less than 1 month)  3. Fat Loss-No significant subcutaneous fat loss,    4. Muscle Loss-No significant muscle mass loss,    5. Fluid Accumulation-No significant fluid accumulation, Generalized  6.   Strength-Not measured    Nutrition Risk Level: High    Nutrient Needs:  · Estimated Daily Total Kcal: 8775-2045 (25-30 x ABW of 64 kg)  · Estimated Daily Protein (g):  (1.2-1.5 x ABW)  · Estimated Daily Total Fluid (ml/day): 1 ml per kcal    Nutrition Diagnosis:   · Problem: Inadequate oral intake  · Etiology: related to Insufficient energy/nutrient consumption     Signs and symptoms:  as evidenced by Diet history of poor intake, Weight loss    Objective Information:  · Nutrition-Focused Physical Findings: +1/+2 edema BLE and generalized; +3 L fluid  · Wound Type: Multiple(SI right hip (no issues noted), DTI to right heel )  · Current Nutrition Therapies:  · Oral Diet Orders: Dysphagia 1 (Pureed), Nectar Thick   · Oral Diet intake: 0%, 1-25%  · Oral Nutrition Supplement (ONS) Orders: None  · ONS intake: NPO  · Anthropometric Measures:  · Ht: 5' 9\" (175.3 cm)   · Current Body Wt: 180 lb (81.6 kg)  · Admission Body Wt: 162 lb (73.5 kg)  · % Weight Change:   8.7% loss with wt on 4/14/19 at 182 # 12.2 oz.  Loss likely a combination of fluid shifts & decreased po  · Ideal Body Wt: 160 lb (72.6 kg)   · BMI Classification: BMI 18.5 - 24.9 Normal Weight    Nutrition Interventions:   Continue current diet, Start ONS  Continued Inpatient Monitoring    Nutrition Evaluation:   · Evaluation: Goals set   · Goals: consume >50% of meals and supplement during admission    · Monitoring: Meal Intake, Supplement Intake, Diet Tolerance, Weight, Pertinent Labs, Skin Integrity, Mental Status/Confusion, Constipation      Electronically signed by Jonathan Schuler RD, LD on 5/3/19 at 12:15 PM    Contact Number: [phone]

## 2019-05-03 NOTE — PLAN OF CARE
Problem: Falls - Risk of:  Goal: Will remain free from falls  Description  Will remain free from falls  Outcome: Ongoing  Goal: Absence of physical injury  Description  Absence of physical injury  Outcome: Ongoing     Problem: Safety:  Goal: Free from accidental physical injury  Description  Free from accidental physical injury  Outcome: Ongoing  Goal: Free from intentional harm  Description  Free from intentional harm  Outcome: Ongoing     Problem: Pain:  Goal: Patient's pain/discomfort is manageable  Description  Patient's pain/discomfort is manageable  Outcome: Ongoing  Goal: Pain level will decrease  Description  Pain level will decrease  Outcome: Ongoing  Goal: Control of acute pain  Description  Control of acute pain  Outcome: Ongoing  Goal: Control of chronic pain  Description  Control of chronic pain  Outcome: Ongoing     Problem: Skin Integrity:  Goal: Skin integrity will stabilize  Description  Skin integrity will stabilize  Outcome: Ongoing     Problem: Risk for Impaired Skin Integrity  Goal: Tissue integrity - skin and mucous membranes  Description  Structural intactness and normal physiological function of skin and  mucous membranes.   Outcome: Ongoing     Problem: Nutrition  Goal: Optimal nutrition therapy  Outcome: Ongoing

## 2019-05-03 NOTE — FLOWSHEET NOTE
Patient pulled out picc line in LAC. picc tip intact, no bleeding or bruising. Dressing and pressure applied, willl continue to monitor.

## 2019-05-03 NOTE — PROGRESS NOTES
Order was place for PICC line placement today after previous PICC Line was pulled out by pt. Pt is complaining of pain to the left arm and the left forearm is very swollen, hard and warm. Left upper arm where PICC was removed is larger by 1.5 cm than it was when the PICC line was placed, it is pink and also slightly warm. Called and spoke to nurse about the arm and suggested getting a vascular study to ensure there is not a clot in the arm before trying to place a new PICC line.

## 2019-05-04 LAB
ALBUMIN SERPL-MCNC: 1.6 G/DL (ref 3.4–5)
ANION GAP SERPL CALCULATED.3IONS-SCNC: 7 MMOL/L (ref 3–16)
BASE EXCESS ARTERIAL: 3 MMOL/L (ref -3–3)
BASOPHILS ABSOLUTE: 0 K/UL (ref 0–0.2)
BASOPHILS RELATIVE PERCENT: 0.3 %
BILIRUBIN URINE: NEGATIVE
BLOOD, URINE: ABNORMAL
BUN BLDV-MCNC: <2 MG/DL (ref 7–20)
CALCIUM SERPL-MCNC: 7.9 MG/DL (ref 8.3–10.6)
CARBOXYHEMOGLOBIN ARTERIAL: 1.3 % (ref 0–1.5)
CHLORIDE BLD-SCNC: 102 MMOL/L (ref 99–110)
CLARITY: ABNORMAL
CO2: 27 MMOL/L (ref 21–32)
COLOR: YELLOW
CREAT SERPL-MCNC: <0.5 MG/DL (ref 0.8–1.3)
EOSINOPHILS ABSOLUTE: 0.7 K/UL (ref 0–0.6)
EOSINOPHILS RELATIVE PERCENT: 7.4 %
EPITHELIAL CELLS, UA: 2 /HPF (ref 0–5)
GFR AFRICAN AMERICAN: >60
GFR NON-AFRICAN AMERICAN: >60
GLUCOSE BLD-MCNC: 107 MG/DL (ref 70–99)
GLUCOSE URINE: NEGATIVE MG/DL
HCO3 ARTERIAL: 27.1 MMOL/L (ref 21–29)
HCT VFR BLD CALC: 27.1 % (ref 40.5–52.5)
HEMOGLOBIN, ART, EXTENDED: 9 G/DL (ref 13.5–17.5)
HEMOGLOBIN: 9 G/DL (ref 13.5–17.5)
HYALINE CASTS: 0 /LPF (ref 0–8)
KETONES, URINE: NEGATIVE MG/DL
LEUKOCYTE ESTERASE, URINE: ABNORMAL
LYMPHOCYTES ABSOLUTE: 1.4 K/UL (ref 1–5.1)
LYMPHOCYTES RELATIVE PERCENT: 15.1 %
MAGNESIUM: 1.8 MG/DL (ref 1.8–2.4)
MCH RBC QN AUTO: 32.7 PG (ref 26–34)
MCHC RBC AUTO-ENTMCNC: 33.2 G/DL (ref 31–36)
MCV RBC AUTO: 98.6 FL (ref 80–100)
METHEMOGLOBIN ARTERIAL: 0.4 %
MICROSCOPIC EXAMINATION: YES
MONOCYTES ABSOLUTE: 1 K/UL (ref 0–1.3)
MONOCYTES RELATIVE PERCENT: 11.4 %
NEUTROPHILS ABSOLUTE: 5.9 K/UL (ref 1.7–7.7)
NEUTROPHILS RELATIVE PERCENT: 65.8 %
NITRITE, URINE: NEGATIVE
O2 CONTENT ARTERIAL: 12 ML/DL
O2 SAT, ARTERIAL: 97.3 %
O2 THERAPY: ABNORMAL
PCO2 ARTERIAL: 41.8 MMHG (ref 35–45)
PDW BLD-RTO: 14.8 % (ref 12.4–15.4)
PH ARTERIAL: 7.43 (ref 7.35–7.45)
PH UA: 8 (ref 5–8)
PHOSPHORUS: 3.8 MG/DL (ref 2.5–4.9)
PLATELET # BLD: 280 K/UL (ref 135–450)
PMV BLD AUTO: 9.7 FL (ref 5–10.5)
PO2 ARTERIAL: 84.2 MMHG (ref 75–108)
POTASSIUM SERPL-SCNC: 3.9 MMOL/L (ref 3.5–5.1)
PROCALCITONIN: 0.17 NG/ML (ref 0–0.15)
PROTEIN UA: NEGATIVE MG/DL
RBC # BLD: 2.75 M/UL (ref 4.2–5.9)
RBC UA: 251 /HPF (ref 0–4)
SODIUM BLD-SCNC: 136 MMOL/L (ref 136–145)
SPECIFIC GRAVITY UA: 1.01 (ref 1–1.03)
T4 FREE: 0.8 NG/DL (ref 0.9–1.8)
TCO2 ARTERIAL: 28.4 MMOL/L
TSH REFLEX: 4.34 UIU/ML (ref 0.27–4.2)
URINE REFLEX TO CULTURE: YES
URINE TYPE: ABNORMAL
UROBILINOGEN, URINE: 0.2 E.U./DL
WBC # BLD: 9 K/UL (ref 4–11)
WBC UA: 5 /HPF (ref 0–5)

## 2019-05-04 PROCEDURE — 85025 COMPLETE CBC W/AUTO DIFF WBC: CPT

## 2019-05-04 PROCEDURE — 80069 RENAL FUNCTION PANEL: CPT

## 2019-05-04 PROCEDURE — 94760 N-INVAS EAR/PLS OXIMETRY 1: CPT

## 2019-05-04 PROCEDURE — 82803 BLOOD GASES ANY COMBINATION: CPT

## 2019-05-04 PROCEDURE — 2500000003 HC RX 250 WO HCPCS: Performed by: INTERNAL MEDICINE

## 2019-05-04 PROCEDURE — 1200000000 HC SEMI PRIVATE

## 2019-05-04 PROCEDURE — 6370000000 HC RX 637 (ALT 250 FOR IP): Performed by: INTERNAL MEDICINE

## 2019-05-04 PROCEDURE — 36600 WITHDRAWAL OF ARTERIAL BLOOD: CPT

## 2019-05-04 PROCEDURE — 36415 COLL VENOUS BLD VENIPUNCTURE: CPT

## 2019-05-04 PROCEDURE — 6360000002 HC RX W HCPCS: Performed by: INTERNAL MEDICINE

## 2019-05-04 PROCEDURE — 2580000003 HC RX 258: Performed by: INTERNAL MEDICINE

## 2019-05-04 PROCEDURE — 84145 PROCALCITONIN (PCT): CPT

## 2019-05-04 PROCEDURE — 83735 ASSAY OF MAGNESIUM: CPT

## 2019-05-04 PROCEDURE — 36592 COLLECT BLOOD FROM PICC: CPT

## 2019-05-04 PROCEDURE — 87086 URINE CULTURE/COLONY COUNT: CPT

## 2019-05-04 PROCEDURE — 84443 ASSAY THYROID STIM HORMONE: CPT

## 2019-05-04 PROCEDURE — 84439 ASSAY OF FREE THYROXINE: CPT

## 2019-05-04 PROCEDURE — 81001 URINALYSIS AUTO W/SCOPE: CPT

## 2019-05-04 PROCEDURE — 51702 INSERT TEMP BLADDER CATH: CPT

## 2019-05-04 PROCEDURE — 2700000000 HC OXYGEN THERAPY PER DAY

## 2019-05-04 RX ADMIN — PHENYTOIN SODIUM 100 MG: 50 INJECTION INTRAMUSCULAR; INTRAVENOUS at 13:43

## 2019-05-04 RX ADMIN — PHENYTOIN SODIUM 100 MG: 50 INJECTION INTRAMUSCULAR; INTRAVENOUS at 22:28

## 2019-05-04 RX ADMIN — ENOXAPARIN SODIUM 40 MG: 40 INJECTION SUBCUTANEOUS at 20:02

## 2019-05-04 RX ADMIN — POTASSIUM CHLORIDE, DEXTROSE MONOHYDRATE AND SODIUM CHLORIDE: 150; 5; 450 INJECTION, SOLUTION INTRAVENOUS at 06:57

## 2019-05-04 RX ADMIN — PHENYTOIN SODIUM 100 MG: 50 INJECTION INTRAMUSCULAR; INTRAVENOUS at 06:46

## 2019-05-04 RX ADMIN — POTASSIUM CHLORIDE, DEXTROSE MONOHYDRATE AND SODIUM CHLORIDE: 150; 5; 450 INJECTION, SOLUTION INTRAVENOUS at 19:55

## 2019-05-04 RX ADMIN — Medication 10 ML: at 20:09

## 2019-05-04 NOTE — PROGRESS NOTES
Patient confused, tele cam alarming. This writer and PCA responded. Patient had PICC line dressing off and was about to pull PICC out of his VANNESSA. Area cleansed and new dressing applied. Coban applied to area to prevent patient from picking at dressing in future. Will cont to monitor. Repositioned. Call light and belongings within reach.  Electronically signed by Irma Herrera RN on 3/2/8176 at 9:13 AM

## 2019-05-04 NOTE — PROGRESS NOTES
Patient refusing to get up in chair. Stated, \"I just want to sleep. \" Electronically signed by Juliana Merritt RN on 5/1/7283 at 3:08 PM

## 2019-05-04 NOTE — PLAN OF CARE
Problem: Falls - Risk of:  Goal: Will remain free from falls  Description  Will remain free from falls  Outcome: Ongoing  Note:   Patient free from falls this shift. Fall precautions in place at all times. Bed in lowest position with two side rails up and wheels locked. Call light within reach. Patient able and agreeable to contact for safety appropriately. Goal: Absence of physical injury  Description  Absence of physical injury  Outcome: Ongoing     Problem: Safety:  Goal: Free from accidental physical injury  Description  Free from accidental physical injury  Outcome: Ongoing  Goal: Free from intentional harm  Description  Free from intentional harm  Outcome: Ongoing     Problem: Pain:  Goal: Patient's pain/discomfort is manageable  Description  Patient's pain/discomfort is manageable  Outcome: Ongoing  Note:   Pt able to express presence/absence of pain and rate pain appropriately using numerical scale. Pain/discomfort being managed with PRN analgesics per MD orders (see MAR). Pain assessed every shift and after interventions. Goal: Pain level will decrease  Description  Pain level will decrease  Outcome: Ongoing  Goal: Control of acute pain  Description  Control of acute pain  Outcome: Ongoing  Goal: Control of chronic pain  Description  Control of chronic pain  Outcome: Ongoing     Problem: Skin Integrity:  Goal: Skin integrity will stabilize  Description  Skin integrity will stabilize  Outcome: Ongoing     Problem: Risk for Impaired Skin Integrity  Goal: Tissue integrity - skin and mucous membranes  Description  Structural intactness and normal physiological function of skin and  mucous membranes.   Outcome: Ongoing

## 2019-05-04 NOTE — PROGRESS NOTES
Hospitalist Progress Note      PCP: Chad Weston MD    Date of Admission: 4/22/2019    Chief Complaint: Dyspnea      Subjective:   Somnolent, but arousable in am.  Intermittent confusion at times  Continues to have copious secretions. Medications:  Reviewed    Infusion Medications    dextrose 5% and 0.45% NaCl with KCl 20 mEq 75 mL/hr at 05/04/19 0657     Scheduled Medications    lidocaine 1 % injection  5 mL Intradermal Once    sodium chloride flush  10 mL Intravenous 2 times per day    thiamine  100 mg Oral Daily    phenytoin  100 mg Intravenous Q8H    nicotine  1 patch Transdermal Q24H    enoxaparin  40 mg Subcutaneous Daily    multivitamin  1 tablet Oral Daily    folic acid  1 mg Oral Daily     PRN Meds: HYDROcodone 5 mg - acetaminophen, sodium chloride flush, potassium chloride, magnesium sulfate, haloperidol lactate      Intake/Output Summary (Last 24 hours) at 5/4/2019 1429  Last data filed at 5/4/2019 0656  Gross per 24 hour   Intake 1318.15 ml   Output 800 ml   Net 518.15 ml       Exam:    /69   Pulse 96   Temp 97.4 °F (36.3 °C) (Axillary)   Resp 20   Ht 5' 9\" (1.753 m)   Wt 179 lb 14.3 oz (81.6 kg)   SpO2 97%   BMI 26.57 kg/m²     Gen/overall appearance: Not in acute distress. Alert. Poor historian at times  Head: Normocephalic, atraumatic  Eyes: EOMI, no scleral icterus  CVS: regular rate and rhythm, Normal S1S2  Pulm: Coarse breath sounds, no wheezing  Gastrointestinal: Soft, nontender, nondistended, no guarding or rebound  Extremities: R arm sling. L arm swelling and erythema.    Neuro: No gross focal deficits noted  Skin: Warm, dry    Labs:   Recent Labs     05/02/19  0613 05/03/19  0832 05/04/19  0640   WBC 8.0 8.5 9.0   HGB 8.9* 9.5* 9.0*   HCT 26.7* 27.9* 27.1*    336 280     Recent Labs     05/02/19  0613 05/03/19  0832 05/04/19  0640    137 136   K 3.8 4.7 3.9    103 102   CO2 26 26 27   BUN <2* <2* <2*   CREATININE <0.5* <0.5* <0.5*   CALCIUM

## 2019-05-04 NOTE — PROGRESS NOTES
Patient's family here at this time, requesting to have assistance to put patient in chair. This writer & PCA into assess. Patient refusing to get up stating, \"I don't want to. Get away from me. \" Patient also yelling at his family members telling them he does not want to get up to the chair or eat. Educated family that if the patient refuses staff is unable to force patient to get up. Verbalized understanding but family looked displeased with staff. Call light and belongings within reach of patient. Will attempt in another hour.  Electronically signed by Neema Vance RN on 8/6/9099 at 2:28 PM

## 2019-05-05 LAB
ALBUMIN SERPL-MCNC: 1.7 G/DL (ref 3.4–5)
ANION GAP SERPL CALCULATED.3IONS-SCNC: 8 MMOL/L (ref 3–16)
BASOPHILS ABSOLUTE: 0.1 K/UL (ref 0–0.2)
BASOPHILS RELATIVE PERCENT: 0.7 %
BUN BLDV-MCNC: <2 MG/DL (ref 7–20)
CALCIUM SERPL-MCNC: 7.9 MG/DL (ref 8.3–10.6)
CHLORIDE BLD-SCNC: 99 MMOL/L (ref 99–110)
CO2: 26 MMOL/L (ref 21–32)
CREAT SERPL-MCNC: <0.5 MG/DL (ref 0.8–1.3)
EOSINOPHILS ABSOLUTE: 0.7 K/UL (ref 0–0.6)
EOSINOPHILS RELATIVE PERCENT: 8 %
GFR AFRICAN AMERICAN: >60
GFR NON-AFRICAN AMERICAN: >60
GLUCOSE BLD-MCNC: 101 MG/DL (ref 70–99)
HCT VFR BLD CALC: 26.7 % (ref 40.5–52.5)
HEMOGLOBIN: 9 G/DL (ref 13.5–17.5)
LYMPHOCYTES ABSOLUTE: 1.4 K/UL (ref 1–5.1)
LYMPHOCYTES RELATIVE PERCENT: 17.6 %
MAGNESIUM: 1.4 MG/DL (ref 1.8–2.4)
MCH RBC QN AUTO: 32.9 PG (ref 26–34)
MCHC RBC AUTO-ENTMCNC: 33.6 G/DL (ref 31–36)
MCV RBC AUTO: 97.8 FL (ref 80–100)
MONOCYTES ABSOLUTE: 1.3 K/UL (ref 0–1.3)
MONOCYTES RELATIVE PERCENT: 15.4 %
NEUTROPHILS ABSOLUTE: 4.8 K/UL (ref 1.7–7.7)
NEUTROPHILS RELATIVE PERCENT: 58.3 %
PDW BLD-RTO: 14.6 % (ref 12.4–15.4)
PHOSPHORUS: 3.9 MG/DL (ref 2.5–4.9)
PLATELET # BLD: 292 K/UL (ref 135–450)
PMV BLD AUTO: 10 FL (ref 5–10.5)
POTASSIUM SERPL-SCNC: 4 MMOL/L (ref 3.5–5.1)
PROCALCITONIN: 0.17 NG/ML (ref 0–0.15)
RBC # BLD: 2.72 M/UL (ref 4.2–5.9)
SODIUM BLD-SCNC: 133 MMOL/L (ref 136–145)
WBC # BLD: 8.2 K/UL (ref 4–11)

## 2019-05-05 PROCEDURE — 2500000003 HC RX 250 WO HCPCS: Performed by: INTERNAL MEDICINE

## 2019-05-05 PROCEDURE — 36592 COLLECT BLOOD FROM PICC: CPT

## 2019-05-05 PROCEDURE — 84145 PROCALCITONIN (PCT): CPT

## 2019-05-05 PROCEDURE — 94760 N-INVAS EAR/PLS OXIMETRY 1: CPT

## 2019-05-05 PROCEDURE — 85025 COMPLETE CBC W/AUTO DIFF WBC: CPT

## 2019-05-05 PROCEDURE — 6360000002 HC RX W HCPCS: Performed by: NURSE PRACTITIONER

## 2019-05-05 PROCEDURE — 6370000000 HC RX 637 (ALT 250 FOR IP): Performed by: INTERNAL MEDICINE

## 2019-05-05 PROCEDURE — 6360000002 HC RX W HCPCS: Performed by: INTERNAL MEDICINE

## 2019-05-05 PROCEDURE — 1200000000 HC SEMI PRIVATE

## 2019-05-05 PROCEDURE — 80069 RENAL FUNCTION PANEL: CPT

## 2019-05-05 PROCEDURE — 83735 ASSAY OF MAGNESIUM: CPT

## 2019-05-05 RX ORDER — DIPHENHYDRAMINE HYDROCHLORIDE 50 MG/ML
25 INJECTION INTRAMUSCULAR; INTRAVENOUS EVERY 6 HOURS PRN
Status: DISCONTINUED | OUTPATIENT
Start: 2019-05-05 | End: 2019-05-05

## 2019-05-05 RX ORDER — DIPHENHYDRAMINE HYDROCHLORIDE 50 MG/ML
25 INJECTION INTRAMUSCULAR; INTRAVENOUS EVERY 6 HOURS PRN
Status: DISCONTINUED | OUTPATIENT
Start: 2019-05-05 | End: 2019-05-07 | Stop reason: HOSPADM

## 2019-05-05 RX ADMIN — ENOXAPARIN SODIUM 40 MG: 40 INJECTION SUBCUTANEOUS at 08:37

## 2019-05-05 RX ADMIN — PHENYTOIN SODIUM 100 MG: 50 INJECTION INTRAMUSCULAR; INTRAVENOUS at 22:43

## 2019-05-05 RX ADMIN — PHENYTOIN SODIUM 100 MG: 50 INJECTION INTRAMUSCULAR; INTRAVENOUS at 06:15

## 2019-05-05 RX ADMIN — MAGNESIUM SULFATE HEPTAHYDRATE 1 G: 1 INJECTION, SOLUTION INTRAVENOUS at 09:35

## 2019-05-05 RX ADMIN — MAGNESIUM SULFATE HEPTAHYDRATE 1 G: 1 INJECTION, SOLUTION INTRAVENOUS at 08:38

## 2019-05-05 RX ADMIN — POTASSIUM CHLORIDE, DEXTROSE MONOHYDRATE AND SODIUM CHLORIDE: 150; 5; 450 INJECTION, SOLUTION INTRAVENOUS at 06:48

## 2019-05-05 RX ADMIN — DIPHENHYDRAMINE HYDROCHLORIDE 25 MG: 50 INJECTION, SOLUTION INTRAMUSCULAR; INTRAVENOUS at 15:46

## 2019-05-05 RX ADMIN — POTASSIUM CHLORIDE, DEXTROSE MONOHYDRATE AND SODIUM CHLORIDE: 150; 5; 450 INJECTION, SOLUTION INTRAVENOUS at 21:42

## 2019-05-05 RX ADMIN — PHENYTOIN SODIUM 100 MG: 50 INJECTION INTRAMUSCULAR; INTRAVENOUS at 14:59

## 2019-05-05 ASSESSMENT — PAIN SCALES - GENERAL
PAINLEVEL_OUTOF10: 0
PAINLEVEL_OUTOF10: 0

## 2019-05-05 NOTE — PROGRESS NOTES
Patient asleep in bed. Noted with O2 off. Sats assessed at 95%RA. Patient refuses to have O2 back on. Patient states he is not hungry and wants to sleep for awhile before trying to eat or taking meds. Patient seems more alert this morning than yesterday morning. Oriented to person, and place. Will cont to monitor. Call light and belongings within reach.  Electronically signed by Cheli Olvera RN on 7/0/0916 at 8:56 AM

## 2019-05-05 NOTE — PROGRESS NOTES
Patient c/o back itching. Noted with a rash on VANNESSA/ L shoulder/ & L back. MD notified. Lotion applied to areas for comfort. Awaiting order for PRN benadryl. Patient has not started any new medications and still has not ate or drank anything.  Electronically signed by Sharri Sood RN on 9/0/8828 at 3:18 PM

## 2019-05-05 NOTE — PROGRESS NOTES
Hospitalist Progress Note      PCP: Waldemar Severin, MD    Date of Admission: 4/22/2019    Chief Complaint: Dyspnea      Subjective:   More alert in am today  Continues to have copious secretions. Poor appetite so not eating. Medications:  Reviewed    Infusion Medications    dextrose 5% and 0.45% NaCl with KCl 20 mEq 75 mL/hr at 05/05/19 0648     Scheduled Medications    lidocaine 1 % injection  5 mL Intradermal Once    sodium chloride flush  10 mL Intravenous 2 times per day    thiamine  100 mg Oral Daily    phenytoin  100 mg Intravenous Q8H    nicotine  1 patch Transdermal Q24H    enoxaparin  40 mg Subcutaneous Daily    multivitamin  1 tablet Oral Daily    folic acid  1 mg Oral Daily     PRN Meds: HYDROcodone 5 mg - acetaminophen, sodium chloride flush, potassium chloride, magnesium sulfate, haloperidol lactate      Intake/Output Summary (Last 24 hours) at 5/5/2019 1323  Last data filed at 5/5/2019 0859  Gross per 24 hour   Intake 1764.99 ml   Output 650 ml   Net 1114.99 ml       Exam:    BP (!) 121/55   Pulse 93   Temp 98 °F (36.7 °C) (Oral)   Resp 16   Ht 5' 9\" (1.753 m)   Wt 175 lb 0.7 oz (79.4 kg)   SpO2 95%   BMI 25.85 kg/m²     Gen/overall appearance: Not in acute distress. Alert. Poor historian at times  Head: Normocephalic, atraumatic  Eyes: EOMI, no scleral icterus  CVS: regular rate and rhythm, Normal S1S2  Pulm: Coarse breath sounds, no wheezing  Gastrointestinal: Soft, nontender, nondistended, no guarding or rebound  Extremities: R arm sling. L arm swelling and erythema.    Neuro: No gross focal deficits noted  Skin: Warm, dry    Labs:   Recent Labs     05/03/19  0832 05/04/19  0640 05/05/19  0645   WBC 8.5 9.0 8.2   HGB 9.5* 9.0* 9.0*   HCT 27.9* 27.1* 26.7*    280 292     Recent Labs     05/03/19  0832 05/04/19  0640 05/05/19  0645    136 133*   K 4.7 3.9 4.0    102 99   CO2 26 27 26   BUN <2* <2* <2*   CREATININE <0.5* <0.5* <0.5*   CALCIUM 7.7* 7.9* 7.9* PHOS 4.1 3.8 3.9     No results for input(s): AST, ALT, BILIDIR, BILITOT, ALKPHOS in the last 72 hours. No results for input(s): INR in the last 72 hours. No results for input(s): Esmer Shadn in the last 72 hours. Assessment/Plan:    Active Hospital Problems    Diagnosis Date Noted    Severe malnutrition ([facility] [E43] 05/03/2019    Severe sepsis ([facility] [A41.9, R65.20] 04/23/2019    HAP (hospital-acquired pneumonia) [J18.9]     Septicemia ([facility] [A41.9]     Cellulitis of left upper extremity [L03.114]     Hypokalemia [E87.6]     Leukemoid reaction [D72.823]      Sepsis 2/2 aspiration PNA  Severe dysphagia  Acute metabolic encephalopathy likley sec to above; improved  Acute hypoxic respiratory failure; improved  Acute on chronic macrocytic anemia. No gross signs of bleeding. Possibly hemodilution. S/p 1u PRBCs  L arm swelling with chronic superficial venous thrombosis  Right femoral fracture shaft and subtrochanteric s/p ORIF on 4/16/19   Recent non displaced  Humeral fracture  Alcohol dependence with abuse. No clinical signs of withdrawal at this time  Hx of seizure d/o; on phenytoin    Nutrition consultation  Monitor on dysphagia diet  C/w course of IV unasyn  Respiratory care  Med nebs  PT/OT/OOB    DVT Prophylaxis:  lovenox  Diet: DIET DYSPHAGIA I PUREED; Dysphagia I Pureed;  Nectar Thick  Dietary Nutrition Supplements: Frozen Oral Supplement  Dietary Nutrition Supplements: Standard High Calorie Oral Supplement  Code Status: Limited    Dispo - Inpt, 2-4 days    Matt Goss MD

## 2019-05-05 NOTE — PROGRESS NOTES
Patient still refusing food or PO fluids.  Electronically signed by Sharri Sood RN on 2/2/1521 at 6:00 PM

## 2019-05-05 NOTE — PROGRESS NOTES
Order for urine specimen. Pts burnette removed and new burnette inserted without difficulty, 10 mL saline inserted into balloon, 100 mL clear yellow urine returned, specimen collected. Pt tolerated well, no complaints at this time. Will continue to monitor and update as needed.

## 2019-05-06 ENCOUNTER — APPOINTMENT (OUTPATIENT)
Dept: GENERAL RADIOLOGY | Age: 64
DRG: 871 | End: 2019-05-06
Payer: MEDICARE

## 2019-05-06 LAB
ALBUMIN SERPL-MCNC: 1.6 G/DL (ref 3.4–5)
ANION GAP SERPL CALCULATED.3IONS-SCNC: 8 MMOL/L (ref 3–16)
BASOPHILS ABSOLUTE: 0.1 K/UL (ref 0–0.2)
BASOPHILS RELATIVE PERCENT: 0.8 %
BUN BLDV-MCNC: <2 MG/DL (ref 7–20)
CALCIUM SERPL-MCNC: 7.5 MG/DL (ref 8.3–10.6)
CHLORIDE BLD-SCNC: 103 MMOL/L (ref 99–110)
CO2: 25 MMOL/L (ref 21–32)
CREAT SERPL-MCNC: <0.5 MG/DL (ref 0.8–1.3)
EOSINOPHILS ABSOLUTE: 0.6 K/UL (ref 0–0.6)
EOSINOPHILS RELATIVE PERCENT: 7.6 %
GFR AFRICAN AMERICAN: >60
GFR NON-AFRICAN AMERICAN: >60
GLUCOSE BLD-MCNC: 96 MG/DL (ref 70–99)
HCT VFR BLD CALC: 26.9 % (ref 40.5–52.5)
HEMOGLOBIN: 9.1 G/DL (ref 13.5–17.5)
LYMPHOCYTES ABSOLUTE: 1.3 K/UL (ref 1–5.1)
LYMPHOCYTES RELATIVE PERCENT: 15.5 %
MAGNESIUM: 1.5 MG/DL (ref 1.8–2.4)
MCH RBC QN AUTO: 33.6 PG (ref 26–34)
MCHC RBC AUTO-ENTMCNC: 33.9 G/DL (ref 31–36)
MCV RBC AUTO: 99.1 FL (ref 80–100)
MONOCYTES ABSOLUTE: 1.2 K/UL (ref 0–1.3)
MONOCYTES RELATIVE PERCENT: 14 %
NEUTROPHILS ABSOLUTE: 5.1 K/UL (ref 1.7–7.7)
NEUTROPHILS RELATIVE PERCENT: 62.1 %
PDW BLD-RTO: 14.6 % (ref 12.4–15.4)
PHOSPHORUS: 4.1 MG/DL (ref 2.5–4.9)
PLATELET # BLD: 252 K/UL (ref 135–450)
PMV BLD AUTO: 9.7 FL (ref 5–10.5)
POTASSIUM SERPL-SCNC: 4.1 MMOL/L (ref 3.5–5.1)
PROCALCITONIN: 0.16 NG/ML (ref 0–0.15)
RBC # BLD: 2.71 M/UL (ref 4.2–5.9)
SODIUM BLD-SCNC: 136 MMOL/L (ref 136–145)
URINE CULTURE, ROUTINE: NORMAL
WBC # BLD: 8.3 K/UL (ref 4–11)

## 2019-05-06 PROCEDURE — 6360000002 HC RX W HCPCS: Performed by: INTERNAL MEDICINE

## 2019-05-06 PROCEDURE — 6370000000 HC RX 637 (ALT 250 FOR IP): Performed by: INTERNAL MEDICINE

## 2019-05-06 PROCEDURE — 97530 THERAPEUTIC ACTIVITIES: CPT

## 2019-05-06 PROCEDURE — 80069 RENAL FUNCTION PANEL: CPT

## 2019-05-06 PROCEDURE — 6370000000 HC RX 637 (ALT 250 FOR IP): Performed by: NURSE PRACTITIONER

## 2019-05-06 PROCEDURE — 97535 SELF CARE MNGMENT TRAINING: CPT

## 2019-05-06 PROCEDURE — 2500000003 HC RX 250 WO HCPCS: Performed by: INTERNAL MEDICINE

## 2019-05-06 PROCEDURE — 94760 N-INVAS EAR/PLS OXIMETRY 1: CPT

## 2019-05-06 PROCEDURE — 6360000002 HC RX W HCPCS: Performed by: NURSE PRACTITIONER

## 2019-05-06 PROCEDURE — 84145 PROCALCITONIN (PCT): CPT

## 2019-05-06 PROCEDURE — 92526 ORAL FUNCTION THERAPY: CPT

## 2019-05-06 PROCEDURE — 71045 X-RAY EXAM CHEST 1 VIEW: CPT

## 2019-05-06 PROCEDURE — 85025 COMPLETE CBC W/AUTO DIFF WBC: CPT

## 2019-05-06 PROCEDURE — 2580000003 HC RX 258: Performed by: INTERNAL MEDICINE

## 2019-05-06 PROCEDURE — 1200000000 HC SEMI PRIVATE

## 2019-05-06 PROCEDURE — 36592 COLLECT BLOOD FROM PICC: CPT

## 2019-05-06 PROCEDURE — 83735 ASSAY OF MAGNESIUM: CPT

## 2019-05-06 RX ADMIN — PHENYTOIN SODIUM 100 MG: 50 INJECTION INTRAMUSCULAR; INTRAVENOUS at 13:25

## 2019-05-06 RX ADMIN — DIPHENHYDRAMINE HYDROCHLORIDE 25 MG: 50 INJECTION, SOLUTION INTRAMUSCULAR; INTRAVENOUS at 19:17

## 2019-05-06 RX ADMIN — POTASSIUM CHLORIDE, DEXTROSE MONOHYDRATE AND SODIUM CHLORIDE: 150; 5; 450 INJECTION, SOLUTION INTRAVENOUS at 13:22

## 2019-05-06 RX ADMIN — Medication 10 ML: at 20:45

## 2019-05-06 RX ADMIN — PHENYTOIN SODIUM 100 MG: 50 INJECTION INTRAMUSCULAR; INTRAVENOUS at 05:56

## 2019-05-06 RX ADMIN — MAGNESIUM SULFATE HEPTAHYDRATE 1 G: 1 INJECTION, SOLUTION INTRAVENOUS at 06:57

## 2019-05-06 RX ADMIN — THERA TABS 1 TABLET: TAB at 08:58

## 2019-05-06 RX ADMIN — ENOXAPARIN SODIUM 40 MG: 40 INJECTION SUBCUTANEOUS at 08:58

## 2019-05-06 RX ADMIN — PHENYTOIN SODIUM 100 MG: 50 INJECTION INTRAMUSCULAR; INTRAVENOUS at 20:44

## 2019-05-06 RX ADMIN — Medication 100 MG: at 08:58

## 2019-05-06 RX ADMIN — MAGNESIUM SULFATE HEPTAHYDRATE 1 G: 1 INJECTION, SOLUTION INTRAVENOUS at 08:59

## 2019-05-06 RX ADMIN — FOLIC ACID 1 MG: 1 TABLET ORAL at 08:58

## 2019-05-06 ASSESSMENT — PAIN SCALES - GENERAL
PAINLEVEL_OUTOF10: 0
PAINLEVEL_OUTOF10: 0

## 2019-05-06 ASSESSMENT — PAIN DESCRIPTION - PAIN TYPE: TYPE: ACUTE PAIN

## 2019-05-06 NOTE — PROGRESS NOTES
[facility]   Speech Therapy  Daily Dysphagia Treatment Note     Trista Clifford  AGE: 70 y.o. GENDER: male  : 1955  [id number]  EPISODE DATE:  2019         Patient Active Problem List   Diagnosis    Fall    Right humeral fracture    Right femoral fracture (HCC)    Alcohol intoxication ([address]    Hypomagnesemia    Epilepsia ([address]    Closed fracture of neck of right femur ([address]    Alcohol dependence ([address]    Multiple rib fractures    Syncope    Shortness of breath    Bronchitis    Tobacco abuse    ETOH abuse    Severe sepsis (HCC)    HAP (hospital-acquired pneumonia)    Septicemia (HCC)    Cellulitis of left upper extremity    Hypokalemia    Leukemoid reaction    Moderate malnutrition (HCC)      No Known Allergies     Treatment Diagnosis: Dysphagia      Remote Chart Review:  4/15/2019 CXR:  Impression   Unchanged left-sided rib fractures.  No underlying pneumothorax or pleural   effusion.       Suspect lower right-sided rib fractures, age indeterminate.          2019 MBS: Moderate oral stage dysphagia characterized by decreased mastication and decreased lingual manipulation which are both compounded by lethargy.  Prolonged mastication with solids with concern for excess labor with regular solids which were not administered d/t concern for excess labor.  Prolonged, disorganized bolus formation and movement with all trials with reduced bolus cohesion and control and premature bolus loss to the pharynx.  Oral residue with all trials is minimized with cued repeat swallow.  Moderate pharyngeal stage dysphagia characterized by delayed swallow, decreased laryngeal elevation, decreased pharyngeal peristalsis.  Premature spillage to the valleculae with overflow to the hypopharynx with all trials.  Vallecular and pyriform residue with all trials is minimized with cued repeat swallow.  Flash penetration with thin.  Suspect significantly improved swallow function as mental status improves. 4/21/2019 Hospitalist documented hospital course:  63 y. o. male with a past medical history of alcohol dependence and withdrawal seizures  who presented to [facility] he had a mechanical fall. Patient was intoxicated on admission. He was admitted with a right femoral shaft fracture along with humerus fracture and rib fracture. Patient underwent ORIF on 4/16/2019. Hospital course was complicated by delirium tremens and acute blood loss anemia along with bronchitis and possible aspiration pneumonia. 05/01/19 MBS: Oral Phase: Moderate oral stage dysphagia characterized by decreased mastication and decreased lingual manipulation which are both compounded by lethargy. Prolonged mastication with solids with concern for excess labor with solids. Prolonged, disorganized bolus formation and movement with all trials with reduced bolus cohesion and control and premature bolus loss to the pharynx. Oral residue with all trials is minimized with cued repeat swallow.     Pharyngeal: Moderate pharyngeal stage dysphagia characterized by delayed swallow, decreased laryngeal elevation, decreased pharyngeal peristalsis. Premature spillage to the valleculae with overflow to the hypopharynx with all trials. Vallecular and pyriform residue with all trials is minimized with cued repeat swallow. Deep penetration with thin- cup and thin-straw. D/t the amount of residue that builds up in the pyriforms throughout study, patient is at an increased risk for penetration/aspiration of residue after the swallow.     Upper Esophageal Screen: KOKI    Recommended Diet:  Solid consistency: Dysphagia I Pureed  Liquid consistency: Nectar  Medication administration: PO         Chart Review:   04/22/2019: Re-admitted to hospital with AMS. Patient was just discharged today. Per SNF patient has been confused and found to have redness to his left arm. Significant cough productive of sputum as well.  Patient recently swallowing safety. ,5/6 ongoing  The patient will improve oral motor function via therapeutic oral motor exercises to 5/5 each trial., 5/6 ongoing  The patient will tolerate nectar thick liquids without signs and symptoms of aspiration 10/10 via cup. 5/6 ongoing     Assessment:   Impressions:   Dysphagia Diagnosis: Severe dysphagia  · Pt looking much more alert, but continues with poor appetite  · Inconsistent vocal quality changes and s/s of decreased pharyngeal clearing  · Pt with request for boost/ensure shake with meals     Diet Recommendations:  Dysphagia I (puree) Diet with Nectar thick liquids, meds crushed in puree  Strategies:   Routine oral care   Small bites/sips  Slow rate  Upright positioning     Education:  Patient Education: Completed on recommendations/plan  Patient Education Response: Verbalizes understanding, Needs reinforcement     Prognosis:   Guarded for safe PO tolerance     Plan:      Continue Dysphagia Therapy: YES     Interventions: Therapeutic Interventions: Therapeutic PO trials with SLP, Diet tolerance monitoring, Laryngeal exercises, Pharyngeal exercises, Oral motor exercises  Duration/Frequency of therapy while on unit: Duration/Frequency of Treatment  Duration/Frequency of Treatment: 3-5x/wk while on acute unit     Discharge Instructions:   Anticipate Yes for further skilled Speech Therapy for Dysphagia at discharge     This note serves as a D/C Summary in the event that this patient is discharged prior to the next therapy session.     Coded treatment time:0  Total treatment time: 520 [address] CCC-SLP #[id number]  Speech Language Pathologist

## 2019-05-06 NOTE — PROGRESS NOTES
Pt still has no appetite and took three sips of a drink after heavy encouragement, other than that has had no fluid intake other than IV fluids. Will continue to encourage pt to eat and drink.

## 2019-05-06 NOTE — PLAN OF CARE
Problem: Falls - Risk of:  Goal: Will remain free from falls  Description  Will remain free from falls  5/6/2019 0949 by Nargis Ayers RN  Outcome: Ongoing  5/6/2019 0525 by Wu Sam RN  Outcome: Ongoing  Note:   Patient free from falls this shift. Fall precautions in place at all times. Bed in lowest position with two side rails up and wheels locked. Call light within reach. Patient able and agreeable to contact for safety appropriately. Goal: Absence of physical injury  Description  Absence of physical injury  5/6/2019 0949 by Nargis Ayers RN  Outcome: Ongoing  5/6/2019 0525 by Wu Sam RN  Outcome: Ongoing     Problem: Safety:  Goal: Free from accidental physical injury  Description  Free from accidental physical injury  5/6/2019 0949 by Nargis Ayers RN  Outcome: Ongoing  5/6/2019 0525 by Wu Sam RN  Outcome: Ongoing  Goal: Free from intentional harm  Description  Free from intentional harm  5/6/2019 0949 by Nargis Ayers RN  Outcome: Ongoing  5/6/2019 0525 by Wu Sam RN  Outcome: Ongoing     Problem: Pain:  Goal: Patient's pain/discomfort is manageable  Description  Patient's pain/discomfort is manageable  5/6/2019 0949 by Nargis Ayers RN  Outcome: Ongoing  5/6/2019 0525 by Wu Sam RN  Outcome: Ongoing  Note:   Pt able to express presence/absence of pain and rate pain appropriately using numerical scale. Pain/discomfort being managed with PRN analgesics per MD orders (see MAR). Pain assessed every shift and after interventions.     Goal: Pain level will decrease  Description  Pain level will decrease  5/6/2019 0949 by Nargis Ayers RN  Outcome: Ongoing  5/6/2019 0525 by Wu Sam RN  Outcome: Ongoing  Goal: Control of acute pain  Description  Control of acute pain  5/6/2019 0949 by Nargis Ayers RN  Outcome: Ongoing  5/6/2019 0525 by Wu Sam RN  Outcome: Ongoing  Goal: Control of chronic pain  Description  Control of chronic pain  5/6/2019 0949 by Queta Zheng RN  Outcome: Ongoing  5/6/2019 0525 by Kylah Crowley RN  Outcome: Ongoing     Problem: Skin Integrity:  Goal: Skin integrity will stabilize  Description  Skin integrity will stabilize  5/6/2019 0949 by Queta Zheng RN  Outcome: Ongoing  5/6/2019 0525 by Kylah Crowley RN  Outcome: Ongoing     Problem: Risk for Impaired Skin Integrity  Goal: Tissue integrity - skin and mucous membranes  Description  Structural intactness and normal physiological function of skin and  mucous membranes.   5/6/2019 0949 by Queta Zheng RN  Outcome: Ongoing  5/6/2019 0525 by Kylah Crowley RN  Outcome: Ongoing     Problem: Nutrition  Goal: Optimal nutrition therapy  Outcome: Ongoing

## 2019-05-06 NOTE — PROGRESS NOTES
Hospitalist Progress Note      PCP: Brinton Spurling, MD    Date of Admission: 4/22/2019    Chief Complaint: Dyspnea      Subjective:   Alert and oriented in am.  Secretions seem to be improving  Dyspnea resolved. Still poor po intake    Medications:  Reviewed    Infusion Medications    dextrose 5% and 0.45% NaCl with KCl 20 mEq 75 mL/hr at 05/06/19 1322     Scheduled Medications    lidocaine 1 % injection  5 mL Intradermal Once    sodium chloride flush  10 mL Intravenous 2 times per day    thiamine  100 mg Oral Daily    phenytoin  100 mg Intravenous Q8H    nicotine  1 patch Transdermal Q24H    enoxaparin  40 mg Subcutaneous Daily    multivitamin  1 tablet Oral Daily    folic acid  1 mg Oral Daily     PRN Meds: diphenhydrAMINE, HYDROcodone 5 mg - acetaminophen, sodium chloride flush, potassium chloride, magnesium sulfate, haloperidol lactate      Intake/Output Summary (Last 24 hours) at 5/6/2019 1357  Last data filed at 5/6/2019 0612  Gross per 24 hour   Intake 1262.47 ml   Output 1900 ml   Net -637.53 ml       Exam:    BP (!) 150/85   Pulse 102   Temp 98.2 °F (36.8 °C)   Resp 17   Ht 5' 9\" (1.753 m)   Wt 176 lb 12.9 oz (80.2 kg)   SpO2 98%   BMI 26.11 kg/m²     Gen/overall appearance: Not in acute distress. Alert. Poor historian at times  Head: Normocephalic, atraumatic  Eyes: EOMI, no scleral icterus  CVS: regular rate and rhythm, Normal S1S2  Pulm: diminished, no wheezing, no resp distress  Gastrointestinal: Soft, nontender, nondistended, no guarding or rebound  Extremities: R arm sling. L arm swelling and erythema.    Neuro: No gross focal deficits noted  Skin: Warm, dry    Labs:   Recent Labs     05/04/19  0640 05/05/19  0645 05/06/19  0540   WBC 9.0 8.2 8.3   HGB 9.0* 9.0* 9.1*   HCT 27.1* 26.7* 26.9*    292 252     Recent Labs     05/04/19  0640 05/05/19  0645 05/06/19  0540    133* 136   K 3.9 4.0 4.1    99 103   CO2 27 26 25   BUN <2* <2* <2*   CREATININE <0.5* <0.5* <0.5*   CALCIUM 7.9* 7.9* 7.5*   PHOS 3.8 3.9 4.1     No results for input(s): AST, ALT, BILIDIR, BILITOT, ALKPHOS in the last 72 hours. No results for input(s): INR in the last 72 hours. No results for input(s): [place] in the last 72 hours. Assessment/Plan:    Active Hospital Problems    Diagnosis Date Noted    Severe malnutrition ([facility] [E43] 05/03/2019    Severe sepsis ([facility] [A41.9, R65.20] 04/23/2019    HAP (hospital-acquired pneumonia) [J18.9]     Septicemia ([facility] [A41.9]     Cellulitis of left upper extremity [L03.114]     Hypokalemia [E87.6]     Leukemoid reaction [D72.823]      Sepsis 2/2 aspiration PNA  Severe dysphagia  Acute metabolic encephalopathy likley sec to above; improved  Acute hypoxic respiratory failure; improved  Acute on chronic macrocytic anemia. No gross signs of bleeding. Possibly hemodilution. S/p 1u PRBCs  L arm swelling with chronic superficial venous thrombosis  Right femoral fracture shaft and subtrochanteric s/p ORIF on 4/16/19   Recent non displaced  Humeral fracture  Alcohol dependence with abuse. No clinical signs of withdrawal at this time  Hx of seizure d/o; on phenytoin    Repeat CXR  Nutrition consulted for supplementation  Monitor on dysphagia diet  S/p course of IV unasyn  Respiratory care  Med nebs  PT/OT/OOB  Ambulatory pulse ox  Discharge planning    DVT Prophylaxis:  lovenox  Diet: DIET DYSPHAGIA I PUREED; Dysphagia I Pureed;  Nectar Thick  Dietary Nutrition Supplements: Frozen Oral Supplement  Dietary Nutrition Supplements: Standard High Calorie Oral Supplement  Code Status: Limited    Dispo - Inpt, 1-2 days    Alix Grubbs MD

## 2019-05-06 NOTE — FLOWSHEET NOTE
05/06/19 1434   Encounter Summary   Services provided to: Patient and family together   Referral/Consult From: [address] Family members   Continue Visiting   (5/6 Support - NAILA Jay blessing SM)   Complexity of Encounter Moderate   Length of Encounter 1 hour   Grief and Life Adjustment   Type Adjustment to illness;Palliative care   Assessment Coping   Intervention Explored feelings, thoughts, concerns;Explored coping resources;Nurtured hope   Outcome Shared life review;Expressed feelings/needs/concerns   Advance Directives (For Healthcare)   Healthcare Directive Yes, patient has an advance directive for healthcare treatment   Type of Healthcare Directive Durable power of  for health care;Living will   Copy in Chart Yes, copy in chart   Chart Copy Status : Current   Date Reviewed and Current: 05/06/19   [address] power of    Healthcare Agent's Name Bairon/Yolanda Claire  (brother and sister in law)   Healthcare Agent's Phone Number [phone] cell

## 2019-05-06 NOTE — PROGRESS NOTES
Physical Therapy  Co-Treat with DANA  Brief Note    Patient Name: Kerri Tang   Patient : 1955  MRN: [id number]   Room Number: Z0O-3283/0742-37      I went to patient's bedside for treatment today. Pt required Minimal Assistance for supine>sit with HOB up. Minimal Assistance to transfer to standing from bed to Clarinda; Moderate Assistance off toilet to Clarinda. Pt requires verbal cues to avoid using right hand. Pt unable to take steps due to 50% WB R LE and NWB on R UE. Pt also assisted to toilet for bowel movement. He required total assist for olivier-care hygiene. Pt also required total assist to don/doff his right arm sling/swath multiple times for gown changes. Recommend continued trial of skilled therapy. Time In: 1152  Time Out: 1215  Treatment Time: 32 minutes.         Marino Bullock, PT

## 2019-05-06 NOTE — PROGRESS NOTES
Occupational Therapy  Facility/Department: [address] MED SURG  Daily Treatment Note  Let this serve as discharge note if patient is discharged prior to next OT session  NAME: Alyce Cleaning  : 1955  MRN: [id number]    Date of Service: 2019    Discharge Recommendations:  3-5 sessions per week    AM-PAC Score  [facility] scored a 9/24 on the AM-PAC ADL Inpatient form. Current research shows that an AM-PAC score of 17 or less is typically not associated with a discharge to the patient's home setting. Based on the patients AM-PAC score and their current ADL deficits, it is recommended that the patient have 3-5 sessions per week of Occupational Therapy at d/c to increase the patients independence. Assessment   Performance deficits / Impairments: Decreased strength;Decreased endurance;Decreased ADL status; Decreased safe awareness;Decreased ROM; Decreased cognition;Decreased balance  Assessment: Patient remains limited by decreased cognition and decreased activity tolerance. Patient will continue to require the use of lift equipment or stand-pivots only due to NWB on right UE, 50% WB on right LE, and patient with decreased understanding/carry over of information. Cont per POC. Treatment Diagnosis: impaired func mob, transfers, and ADL status   Prognosis: Fair  Patient Education: WB status, orientation   REQUIRES OT FOLLOW UP: Yes  Activity Tolerance: Patient limited by fatigue;Treatment limited secondary to decreased cognition  Safety Devices in place: Yes  Type of devices: Call light within reach; Chair alarm in place; Left in chair;Gait belt;Nurse notified(sitter cam present )         Patient Diagnosis(es): The primary encounter diagnosis was Septicemia ([address] Diagnoses of Leukemoid reaction, Hypokalemia, Hypomagnesemia, Cellulitis of left upper extremity, and HAP (hospital-acquired pneumonia) were also pertinent to this visit.       has a past medical history of BPH (benign prostatic hyperplasia) and Seizures ([facility] has a past surgical history that includes Femur fracture surgery (Right, 4/16/2019) and Skull fracture elevation. Restrictions  Restrictions/Precautions: Fall Risk, Weight Bearing  Right Lower Extremity Weight Bearing: Partial Weight Bearing(50% WB R LE)  Right Upper Extremity Weight Bearing: Non Weight Bearing  Other position/activity restrictions: R UE NWB immob with Sling (no ROM Exs), R LE 50% Wgt Bear. H/O ETOH. Diet : Dysphagia I, Pureed, Pudding Thick. Subjective   Chart Reviewed: Yes  Patient assessed for rehabilitation services?: Yes  Additional Pertinent Hx: HPI initial admission Per Ryne Tapia MD 4/14/19: Pt is \"77 y.o. male who presented to [facility] after he had a mechanical fall, admitted to have right hip pain, 10 out of 10 intensity, nonradiating, sharp, worse with any movement, to note that patient is poor historian and he was intoxicated on presentation. Found to have right femoral and right humeral fracture along was 2 broken ribs. Hold on the right side, patient denies nausea, vomiting, abdominal pain or chest pain when examined. Denies any hallucination at this time, drinks on a daily basis. \" 4/23 admitted from [address] [place] [address] w/ septcemia, HCAP ,encephalopathy,cellulitis  Response to previous treatment: Patient with no complaints from previous session  Family / Caregiver Present: No  Referring Practitioner: Carito Gardiner  Diagnosis: 4/23 admitted from [address] [place] [address] w/ septicemia, HCAP w/aspiration ,encephalopathy,cellulitis, s/p ORIF of R femur fx (50% WB) and conservative tx of R humeral fx (NWB) sling and swath  Subjective: Patient met b/s as co-treat with PT, patient agreeable to OOB, does not report or indicate any pain     Objective    ADL  Feeding: (patient refuses to eat once set up with lunch tray )  UE Dressing: (gown changed due to BM on gown, dependent to doff/don sling )  Toileting: Dependent/Total(catheter urine, dependent hygiene following BM, patient begins with a smear on gown and then needed encouragement to try to use toilet, patient has success with a large BM once on commode )    Balance  Sitting Balance: Stand by assistance  Standing Balance: Minimal assistance(in silvia brink with cues for upright posture )    Transfers  Sit to stand: (Min A from EOB and recliner in silvia brink, Mod A from toilet )  Stand to sit: (Min A )    Functional Mobility  Assist Level: Dependent/Total  Functional Mobility Comments: bed-recliner-toilet-recliner with silvia brink     Bed mobility  Supine to Sit: Minimal assistance  Scooting: Stand by assistance(cues )    Cognition  Overall Cognitive Status: Exceptions  Arousal/Alertness: Delayed responses to stimuli  Following Commands:  Follows one step commands with increased time  Attention Span: Attends with cues to redirect  Memory: Decreased recall of recent events  Safety Judgement: Decreased awareness of need for safety;Decreased awareness of need for assistance  Problem Solving: Assistance required to identify errors made;Assistance required to generate solutions;Assistance required to implement solutions  Insights: Not aware of deficits  Initiation: Requires cues for some  Sequencing: Requires cues for some    Plan   Times per week: 3-5  Times per day: Daily  Current Treatment Recommendations: Strengthening, Functional Mobility Training, Endurance Training, Balance Training, Safety Education & Training, Equipment Evaluation, Education, & procurement, Patient/Caregiver Education & Training, Self-Care / ADL, Positioning, ROM    Goals  Short term goals  Time Frame for Short term goals: prior to d/c: status as of 5/6/19: all goals ongoing  Short term goal 1: Pt will complete sit <> stand transfer with mod A  Short term goal 2: Pt will feed self w/ Supervision after set up when swallow improves  Short term goal 3: Pt will complete dressing /bathing with modA  Short term goal 4: Pt will complete toileting with max A  Short term goal 5: Pt will complete grooming with setup  Patient Goals   Patient goals : Pt did not formulate goal at this time ,is aware need for ongoing therapy       Therapy Time   Individual Concurrent Group Co-treatment   Time In       1152   Time Out       1215   Minutes       23   Timed Code Treatment Minutes: Remy Gutierrez [address] [facility] [id number] [place] [address]

## 2019-05-07 VITALS
DIASTOLIC BLOOD PRESSURE: 68 MMHG | HEART RATE: 109 BPM | BODY MASS INDEX: 25.93 KG/M2 | TEMPERATURE: 98.5 F | OXYGEN SATURATION: 94 % | WEIGHT: 175.04 LBS | HEIGHT: 69 IN | RESPIRATION RATE: 19 BRPM | SYSTOLIC BLOOD PRESSURE: 104 MMHG

## 2019-05-07 LAB
ALBUMIN SERPL-MCNC: 1.5 G/DL (ref 3.4–5)
ANION GAP SERPL CALCULATED.3IONS-SCNC: 8 MMOL/L (ref 3–16)
BASOPHILS ABSOLUTE: 0.1 K/UL (ref 0–0.2)
BASOPHILS RELATIVE PERCENT: 1.3 %
BUN BLDV-MCNC: <2 MG/DL (ref 7–20)
CALCIUM SERPL-MCNC: 7.7 MG/DL (ref 8.3–10.6)
CHLORIDE BLD-SCNC: 100 MMOL/L (ref 99–110)
CO2: 25 MMOL/L (ref 21–32)
CREAT SERPL-MCNC: <0.5 MG/DL (ref 0.8–1.3)
EOSINOPHILS ABSOLUTE: 0.7 K/UL (ref 0–0.6)
EOSINOPHILS RELATIVE PERCENT: 8 %
GFR AFRICAN AMERICAN: >60
GFR NON-AFRICAN AMERICAN: >60
GLUCOSE BLD-MCNC: 99 MG/DL (ref 70–99)
HCT VFR BLD CALC: 26.9 % (ref 40.5–52.5)
HEMOGLOBIN: 9.2 G/DL (ref 13.5–17.5)
LYMPHOCYTES ABSOLUTE: 1.4 K/UL (ref 1–5.1)
LYMPHOCYTES RELATIVE PERCENT: 17 %
MAGNESIUM: 1.5 MG/DL (ref 1.8–2.4)
MCH RBC QN AUTO: 33.4 PG (ref 26–34)
MCHC RBC AUTO-ENTMCNC: 34.2 G/DL (ref 31–36)
MCV RBC AUTO: 97.6 FL (ref 80–100)
MONOCYTES ABSOLUTE: 1.2 K/UL (ref 0–1.3)
MONOCYTES RELATIVE PERCENT: 14.6 %
NEUTROPHILS ABSOLUTE: 4.9 K/UL (ref 1.7–7.7)
NEUTROPHILS RELATIVE PERCENT: 59.1 %
PDW BLD-RTO: 14.5 % (ref 12.4–15.4)
PHOSPHORUS: 4.2 MG/DL (ref 2.5–4.9)
PLATELET # BLD: 242 K/UL (ref 135–450)
PMV BLD AUTO: 9.8 FL (ref 5–10.5)
POTASSIUM SERPL-SCNC: 4 MMOL/L (ref 3.5–5.1)
PROCALCITONIN: 0.18 NG/ML (ref 0–0.15)
RBC # BLD: 2.75 M/UL (ref 4.2–5.9)
SODIUM BLD-SCNC: 133 MMOL/L (ref 136–145)
WBC # BLD: 8.4 K/UL (ref 4–11)

## 2019-05-07 PROCEDURE — 2580000003 HC RX 258: Performed by: INTERNAL MEDICINE

## 2019-05-07 PROCEDURE — 2500000003 HC RX 250 WO HCPCS: Performed by: INTERNAL MEDICINE

## 2019-05-07 PROCEDURE — 80069 RENAL FUNCTION PANEL: CPT

## 2019-05-07 PROCEDURE — 97110 THERAPEUTIC EXERCISES: CPT

## 2019-05-07 PROCEDURE — 94760 N-INVAS EAR/PLS OXIMETRY 1: CPT

## 2019-05-07 PROCEDURE — 85025 COMPLETE CBC W/AUTO DIFF WBC: CPT

## 2019-05-07 PROCEDURE — 6360000002 HC RX W HCPCS: Performed by: INTERNAL MEDICINE

## 2019-05-07 PROCEDURE — 6370000000 HC RX 637 (ALT 250 FOR IP): Performed by: INTERNAL MEDICINE

## 2019-05-07 PROCEDURE — 6360000002 HC RX W HCPCS: Performed by: NURSE PRACTITIONER

## 2019-05-07 PROCEDURE — 83735 ASSAY OF MAGNESIUM: CPT

## 2019-05-07 PROCEDURE — 84145 PROCALCITONIN (PCT): CPT

## 2019-05-07 PROCEDURE — 6370000000 HC RX 637 (ALT 250 FOR IP): Performed by: NURSE PRACTITIONER

## 2019-05-07 PROCEDURE — 97530 THERAPEUTIC ACTIVITIES: CPT

## 2019-05-07 RX ORDER — CYCLOBENZAPRINE HCL 10 MG
10 TABLET ORAL 3 TIMES DAILY PRN
Qty: 30 TABLET | Refills: 0 | Status: SHIPPED | OUTPATIENT
Start: 2019-05-07 | End: 2019-05-17

## 2019-05-07 RX ORDER — OXYCODONE HYDROCHLORIDE AND ACETAMINOPHEN 5; 325 MG/1; MG/1
1 TABLET ORAL EVERY 4 HOURS PRN
Qty: 12 TABLET | Refills: 0 | Status: SHIPPED | OUTPATIENT
Start: 2019-05-07 | End: 2019-05-10

## 2019-05-07 RX ORDER — FUROSEMIDE 10 MG/ML
20 INJECTION INTRAMUSCULAR; INTRAVENOUS ONCE
Status: COMPLETED | OUTPATIENT
Start: 2019-05-07 | End: 2019-05-07

## 2019-05-07 RX ORDER — MULTIVITAMIN WITH FOLIC ACID 400 MCG
1 TABLET ORAL DAILY
Qty: 30 TABLET | Refills: 0 | Status: SHIPPED | OUTPATIENT
Start: 2019-05-07

## 2019-05-07 RX ORDER — GABAPENTIN 300 MG/1
300 CAPSULE ORAL 3 TIMES DAILY
COMMUNITY

## 2019-05-07 RX ORDER — GABAPENTIN 300 MG/1
300 CAPSULE ORAL 3 TIMES DAILY
Status: DISCONTINUED | OUTPATIENT
Start: 2019-05-07 | End: 2019-05-07 | Stop reason: HOSPADM

## 2019-05-07 RX ORDER — NICOTINE 21 MG/24HR
1 PATCH, TRANSDERMAL 24 HOURS TRANSDERMAL EVERY 24 HOURS
Qty: 30 PATCH | Refills: 3 | Status: SHIPPED | OUTPATIENT
Start: 2019-05-07

## 2019-05-07 RX ORDER — CHLORDIAZEPOXIDE HYDROCHLORIDE 5 MG/1
5 CAPSULE, GELATIN COATED ORAL 3 TIMES DAILY PRN
Qty: 9 CAPSULE | Refills: 0 | Status: SHIPPED | OUTPATIENT
Start: 2019-05-07 | End: 2019-05-10

## 2019-05-07 RX ORDER — PHENYTOIN SODIUM 100 MG/1
100 CAPSULE, EXTENDED RELEASE ORAL 3 TIMES DAILY
Qty: 60 CAPSULE | Refills: 3 | Status: SHIPPED | OUTPATIENT
Start: 2019-05-07

## 2019-05-07 RX ORDER — PHENYTOIN SODIUM 100 MG/1
100 CAPSULE, EXTENDED RELEASE ORAL ONCE
Status: DISCONTINUED | OUTPATIENT
Start: 2019-05-07 | End: 2019-05-07 | Stop reason: HOSPADM

## 2019-05-07 RX ADMIN — Medication 100 MG: at 11:21

## 2019-05-07 RX ADMIN — PHENYTOIN SODIUM 100 MG: 50 INJECTION INTRAMUSCULAR; INTRAVENOUS at 06:02

## 2019-05-07 RX ADMIN — GABAPENTIN 300 MG: 300 CAPSULE ORAL at 20:08

## 2019-05-07 RX ADMIN — FOLIC ACID 1 MG: 1 TABLET ORAL at 11:21

## 2019-05-07 RX ADMIN — POTASSIUM CHLORIDE, DEXTROSE MONOHYDRATE AND SODIUM CHLORIDE: 150; 5; 450 INJECTION, SOLUTION INTRAVENOUS at 02:23

## 2019-05-07 RX ADMIN — MAGNESIUM SULFATE HEPTAHYDRATE 1 G: 1 INJECTION, SOLUTION INTRAVENOUS at 12:49

## 2019-05-07 RX ADMIN — MAGNESIUM SULFATE HEPTAHYDRATE 1 G: 1 INJECTION, SOLUTION INTRAVENOUS at 14:00

## 2019-05-07 RX ADMIN — THERA TABS 1 TABLET: TAB at 11:21

## 2019-05-07 RX ADMIN — ENOXAPARIN SODIUM 40 MG: 40 INJECTION SUBCUTANEOUS at 11:24

## 2019-05-07 RX ADMIN — FUROSEMIDE 20 MG: 10 INJECTION, SOLUTION INTRAMUSCULAR; INTRAVENOUS at 11:26

## 2019-05-07 RX ADMIN — PHENYTOIN SODIUM 100 MG: 50 INJECTION INTRAMUSCULAR; INTRAVENOUS at 14:44

## 2019-05-07 RX ADMIN — Medication 10 ML: at 11:21

## 2019-05-07 ASSESSMENT — PAIN SCALES - GENERAL: PAINLEVEL_OUTOF10: 0

## 2019-05-07 NOTE — PROGRESS NOTES
Physical Therapy  Brief Note    Patient Name: Alexandre Fagan   Patient : 1955  MRN: [id number]   Room Number: M4D-0342/8411-67      I went to patient's bedside to attempt treatment this afternoon. Pt initially agreeable to attempting to get up to edge of bed but then changed his mind and wanted to stay in bed to view TV. Pt willing to participate in bed exercise. SLR x 10 reps, heel slide x 10 reps (Mod Assist on R LE). I encouraged patient to eat; placed bedside table with lunch tray in his reach. I encouraged patient to mobilize again however he adamantly declined. Pt left with call light in reach, bed alarm activated, RN in room.       Time In: 1238  Time Out: 1250  Treatment Time: 12 minutes        Pam Bullock, PT

## 2019-05-07 NOTE — PROGRESS NOTES
Physical Therapy  Attempt Note    Patient Name: Michelle Giang   Patient : 1955  MRN: [id number]   Room Number: V4P-9995/5245-60      I heard telesiter alarm sounding and went with RN to patient's room. Pt seated partially to edge of bed attempting to get a blanket around his shoulders. Pt willing to get up to recliner chair at this time. He required Stand-by Assistance for transition supine to sit with HOB up and bed rail use. He stood up in [employer] with [employer], NWB on R UE; [employer] to transfer [employer] to [facility]. Pt performed the following chair exercise: LAQ x 10 reps (Min Assist on R LE). Pt set up with meal tray and encouraged to increase p.o. Intake; pt agreed to drink his Ensure but declined all else. Pt left with chair alarm on, call light in reach, personal belongings in reach. RN notified. This note also serves as a D/C Summary in the event that this patient is discharged prior to the next therapy session. Please refer to last PT note for goal status, discharge recommendations and functional status.       Time In: 1305  Time Out: 1330  Treatment Time: 25 minutes    Mayda Bullock, PT

## 2019-05-07 NOTE — CARE COORDINATION
SOCIAL WORK DISCHARGE SUMMARY:      DISCHARGE DATE:                 5/7/19      DISCHARGE PLACE:                [facility]               REPORT NUMBER:       [id number]               FAX NUMBER:                [phone]      TRANSPORTATION:                [facility] [phone]             TIME:                              3pm      PREFERRED PHARMACY:     At facility      INSURANCE PRECERT OBTAINED:       Not needed    HENS/PASAAR COMPLETED:                  Not needed, returning    Bedside RN, family, facility aware. IMM presented to [facility].      Electronically signed by CARLY Gary on 5/7/2019 at 11:08 AM

## 2019-05-07 NOTE — DISCHARGE SUMMARY
rebound  Extremities: R arm sling. L arm swelling and erythema, trace LE edema  Neuro: No gross focal deficits noted  Skin: Warm, dry        Consults:     IP CONSULT TO SOCIAL WORK  IP CONSULT TO PALLIATIVE CARE  IP CONSULT TO PALLIATIVE CARE  IP CONSULT TO DIETITIAN  IP CONSULT TO DIETITIAN  IP CONSULT TO DIETITIAN    Disposition:  SNF     Condition:  Stable    Discharge Instructions/Follow-up:   Pt to follow up with PCP within 1 week  Consultants as scheduled    Code Status:  Limited    Activity: activity as tolerated    Diet: dysphagia    Labs: For convenience and continuity at follow-up the following most recent labs are provided:      CBC:    Lab Results   Component Value Date    WBC 8.4 05/07/2019    HGB 9.2 05/07/2019    HCT 26.9 05/07/2019     05/07/2019       Renal:    Lab Results   Component Value Date     05/07/2019    K 4.0 05/07/2019    K 2.9 04/24/2019     05/07/2019    CO2 25 05/07/2019    BUN <2 05/07/2019    CREATININE <0.5 05/07/2019    CALCIUM 7.7 05/07/2019    PHOS 4.2 05/07/2019       Discharge Medications:     Current Discharge Medication List           Details   nicotine (NICODERM CQ) 21 MG/24HR Place 1 patch onto the skin every 24 hours  Qty: 30 patch, Refills: 3      Multiple Vitamin (MULTIVITAMIN) tablet Take 1 tablet by mouth daily  Qty: 30 tablet, Refills: 0              Details   oxyCODONE-acetaminophen (PERCOCET) 5-325 MG per tablet Take 1 tablet by mouth every 4 hours as needed for Pain for up to 3 days. Qty: 12 tablet, Refills: 0    Comments: Reduce doses taken as pain becomes manageable  Associated Diagnoses: Closed displaced fracture of lesser trochanter of right femur, initial encounter ([facility] Closed fracture of proximal end of right humerus, unspecified fracture morphology, initial encounter      chlordiazePOXIDE (LIBRIUM) 5 MG capsule Take 1 capsule by mouth 3 times daily as needed for Anxiety for up to 3 days.   Qty: 9 capsule, Refills: 0    Associated Diagnoses: Alcohol dependence with withdrawal delirium (HCC)      cyclobenzaprine (FLEXERIL) 10 MG tablet Take 1 tablet by mouth 3 times daily as needed for Muscle spasms  Qty: 30 tablet, Refills: 0      phenytoin (DILANTIN) 100 MG ER capsule Take 1 capsule by mouth 3 times daily  Qty: 60 capsule, Refills: 3              Details   gabapentin (NEURONTIN) 300 MG capsule Take 300 mg by mouth 3 times daily. ipratropium-albuterol (DUONEB) 0.5-2.5 (3) MG/3ML SOLN nebulizer solution Inhale 3 mLs into the lungs every 4 hours (while awake)  Qty: 360 mL, Refills: 0      folic acid (FOLVITE) 1 MG tablet Take 1 tablet by mouth daily  Qty: 30 tablet, Refills: 3      docusate sodium (COLACE, DULCOLAX) 100 MG CAPS Take 100 mg by mouth 2 times daily  Qty: 30 capsule, Refills: 0      senna (SENOKOT) 8.6 MG tablet Take 1 tablet by mouth nightly  Qty: 30 tablet, Refills: 0      vitamin B-1 100 MG tablet Take 1 tablet by mouth daily  Qty: 30 tablet, Refills: 3      aspirin 325 MG EC tablet Take 1 tablet by mouth daily for 14 days Begin after Lovenox dosing completed for DVT prophylaxis  Qty: 14 tablet, Refills: 0      doxazosin (CARDURA) 4 MG tablet Take 4 mg by mouth nightly      famotidine (PEPCID) 20 MG tablet Take 1 tablet by mouth 2 times daily  Qty: 20 tablet, Refills: 0             Time Spent on discharge is more than 1 hour in the examination, evaluation, counseling and review of medications and discharge plan. Signed:    Deon Aparicio MD   5/7/2019    Thank you Lidia Alicea MD for the opportunity to be involved in this patient's care.

## 2019-05-07 NOTE — PLAN OF CARE
Problem: Falls - Risk of:  Goal: Will remain free from falls  Description  Will remain free from falls  Outcome: Ongoing   Pt free from falls this shift. Fall precautions in place at all times. Call light always within reach. Pt able and agreeable to contact for safety appropriately. Problem: Pain:  Goal: Control of acute pain  Description  Control of acute pain  Outcome: Ongoing  Pain/discomfort being managed with PRN analgesics per MD orders. Pt able to express presence and absence of pain and rate pain appropriately using numerical scale. Problem: Risk for Impaired Skin Integrity  Goal: Tissue integrity - skin and mucous membranes  Description  Structural intactness and normal physiological function of skin and  mucous membranes. Outcome: Ongoing  Skin assessment performed each shift per protocol. Skin care protocol in place. Pt turned and repositioned every two hours and prn with pillow support. Specialty mattress in place.

## 2019-05-07 NOTE — CARE COORDINATION
Bedside RN requesting time for transport be delayed. Transport rescheduled with [facility] for 7pm. VM left with POA/brother. [facility] aware.      Electronically signed by CARLY Christine on 5/7/2019 at 1:59 PM

## 2019-05-07 NOTE — PROGRESS NOTES
Pt sleeping quietly in bed. IVF w/ potassium infusing at 75 cc/hr through [address]- site dry and intact. Pt refused Prevalon boots. Bed alarm on . [place] catheter in place draining yellow urine. Call light and silvia suction in reach. Bed alarm on. Will continue to monitor.

## 2019-05-07 NOTE — PROGRESS NOTES
Family requesting that gabapentin be restarted because pt reports taking it at home. Not on MAR. MD notified. Instructed to have pharmacy check med rec for gabapentin. Notified pharmacy.

## 2019-05-08 NOTE — PROGRESS NOTES
Pt resting in bed with respirations even and unlabored. Denies pain. Pt is A&O to person and place. AM meds administered without issue. Pt c/o about wanting a cigarette but is cooperative with care. No needs at this time. Bed alarm active. Tele cam at bedside. Will continue to monitor.

## 2019-05-08 NOTE — PLAN OF CARE
Problem: Falls - Risk of:  Goal: Will remain free from falls  Description  Will remain free from falls  Outcome: Ongoing   Pt free of falls this shift. Fall precautions in place. Bed in lowest position side rails x2. Fall precaution indication light on. Exit alarms in place. Non slip socks on. Fall ID bracelet on. Needed items within reach. Call light within reach. Problem: Safety:  Goal: Free from accidental physical injury  Description  Free from accidental physical injury  Outcome: Ongoing   Pt free of accidental physical injury. Bed in lowest position, wheels locked, ID band correct and in place, call light within reach.

## 2019-05-08 NOTE — PROGRESS NOTES
Pt has not voided since burnette removal. Bladder scan shows 75mL. MD aware. Will make sure NH RN monitors for urine output.    Electronically signed by Miguel A Clark RN on 5/7/2019 at 7:10PM
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
## 2019-06-03 ENCOUNTER — OFFICE VISIT (OUTPATIENT)
Dept: ORTHOPEDIC SURGERY | Age: 64
End: 2019-06-03

## 2019-06-03 VITALS — HEART RATE: 68 BPM | HEIGHT: 69 IN | BODY MASS INDEX: 24.88 KG/M2 | WEIGHT: 168 LBS | RESPIRATION RATE: 16 BRPM

## 2019-06-03 DIAGNOSIS — S42.224D CLOSED 2-PART NONDISPLACED FRACTURE OF SURGICAL NECK OF RIGHT HUMERUS WITH ROUTINE HEALING, SUBSEQUENT ENCOUNTER: Primary | ICD-10-CM

## 2019-06-03 DIAGNOSIS — S72.21XD CLOSED DISPLACED SUBTROCHANTERIC FRACTURE OF RIGHT FEMUR WITH ROUTINE HEALING, SUBSEQUENT ENCOUNTER: ICD-10-CM

## 2019-06-03 PROCEDURE — 99024 POSTOP FOLLOW-UP VISIT: CPT | Performed by: ORTHOPAEDIC SURGERY

## 2019-06-03 NOTE — LETTER
[facility]  [address]  Phone: [phone]  Fax: [phone]    Javier Underwood MD        Vickie 3, 2019     Patient: Alexandre Fagan   YOB: 1955   Date of Visit: 6/3/2019       To Whom It May Concern: It is my medical opinion that Anton Fry should continue to work on ROM and strengthening with PT. Continue 50% weight bearing right leg. Weight bearing as tolerated right arm. Continue PT for right shoulder ROM and strengthening. The patient will come back for a follow up in 4 weeks. At that time, we will take Two views right hip, and AP pelvis. As this patient has demonstrated risk factors for osteoporosis, such as age greater than [de-identified] years and evidence of a fracture, I have referred the patient back to the primary care physician for evaluation for osteoporosis, including consideration for DEXA scanning, if this is felt to be clinically indicated. The patient is advised to contact the primary care physician to follow-up for further evaluation. If you have any questions or concerns, please don't hesitate to call.     Sincerely,        Javier Underwood MD

## 2019-06-03 NOTE — PROGRESS NOTES
DIAGNOSIS:  Right subrochanteric femur fracture, status post T2 recon nail. Right proximal humerus fracture    DATE OF SURGERY:  4/16/19    HISTORY OF PRESENT ILLNESS: Mr. Daysi Welsh 70 y.o. male  who comes in today for postoperative visit. The patient rates pain 5/10. He has been 50% weight bearing. He was discharged on 4/22/19 and readmitted the same night for mental status changes and hypoxia. He was discharged again on 5/7/19. He is at [facility]. PHYSICAL EXAMINATION:    Pulse 68   Resp 16   Ht 5' 9\" (1.753 m)   Wt 168 lb (76.2 kg)   BMI 24.81 kg/m²    Patient is awake, alert, and in no acute distress. The incisions are healed. He has no pain with the active or passive range of motion of the right hip. He has intact sensation to light touch distally in bilateral legs. Bilateral legs are warm and well perfused. Right shoulder nontender to palpation. Right shoulder active forward flexion 150  EPL / FPL / Interossei intact. Sensation intact to light touch bilateral hands. Bilateral arms warm and well perfused. IMAGING:    Right hip xrays: Two views (AP and lateral) right  Hip, and AP pelvis were obtained and reviewed. Showed anatomic alignment of the intertrochanteric fracture, recon nail in good position, no loosening, or hardware failure. Some callus formation at fracture site. Right shoulder xrays: 3 view x-rays of the shoulder including AP in internal and external rotation and scapular Y obtained and reviewed. Interval callus formation. No change in alignment. IMPRESSION:    6 weeks status post right intramedullary nailing of subtrochanteric femur fracture. Right proximal humerus fracture  Alcohol abuse      PLAN:    Will have the patient continue to work on ROM and strengthening with PT. Continue 50% weight bearing right leg. Weight bearing as tolerated right arm. Continue PT for right shoulder ROM and strengthening.       The patient will come back for a follow up in 4 weeks. At that time, we will take Two views right hip, and AP pelvis. As this patient has demonstrated risk factors for osteoporosis, such as age greater than [de-identified] years and evidence of a fracture, I have referred the patient back to the primary care physician for evaluation for osteoporosis, including consideration for DEXA scanning, if this is felt to be clinically indicated. The patient is advised to contact the primary care physician to follow-up for further evaluation.

## 2019-06-07 ENCOUNTER — HOSPITAL ENCOUNTER (OUTPATIENT)
Dept: SPEECH THERAPY | Age: 64
Setting detail: THERAPIES SERIES
Discharge: HOME | End: 2019-06-07
Payer: COMMERCIAL

## 2019-06-07 ENCOUNTER — HOSPITAL ENCOUNTER (OUTPATIENT)
Dept: GENERAL RADIOLOGY | Age: 64
Discharge: HOME | End: 2019-06-07
Payer: COMMERCIAL

## 2019-06-07 DIAGNOSIS — R13.12 OROPHARYNGEAL DYSPHAGIA: Primary | ICD-10-CM

## 2019-06-07 DIAGNOSIS — R13.10 DYSPHAGIA, UNSPECIFIED TYPE: ICD-10-CM

## 2019-06-07 PROCEDURE — 74230 X-RAY XM SWLNG FUNCJ C+: CPT

## 2019-06-07 PROCEDURE — 92611 MOTION FLUOROSCOPY/SWALLOW: CPT

## 2019-06-07 NOTE — PROCEDURES
for bolus formation/cohesion, bolus control and A-P oral transit; delayed palatal trigger and generalized weakness; delayed initiation of swallow; reduced tongue base retraction; reduced hyolaryngeal excursion; reduced pharyngeal clearance; and as pt fatigued reduced laryngeal elevation. · Pt did not have any initial aspiration or penetration. However as pt fatigued thin liquid penetration/aspiration without cough response  · Pt did have persistent oral and pharyngeal residue which increases as viscosity of food consistency increased     Symptoms:  · Inconsistent premature loss of thin and nectar thick liquids to the pharynx  · Prolonged lingual mashing A-P oral transit of puree, mech soft, dental soft and regular solid with gradual pooling of material to the pharynx  · Pooling of all items to the valleculae prior to the swallow  · Pooling of thin liquids and inconsistently the nectar thick liquids to the pyriform sinus prior to the swallow  · Post swallow oral and pharyngeal residue all items (oral and valleculae>pyriform sinus residue); but increased as viscosity increased  · Pt with an episode of deep penetration with aspiration of thin liquids before/during swallow without cough response    3. Analysis of compensatory strategies  · Pt had difficulty following sequential commands  · Pt was able to clear oral residue but 4-6 swallows to clear dental and regular solid food  · Pt was able to clear pyriform sinus>valleculae residue of foods with with clearance swallow with residual barium lining the valleculae    4. Fatigue further exacerbates with increase risks     Dysphagia Score: Dysphagia Outcome Severity Scale: Level 3: Moderate dysphagia- Total assistance, supervision or strategies. Two or more diet consistencies restricted    Aspiration/Penetration Risk:   One episode of thin liquid deep penetration with aspiration without cough response at end of procedure.       Diet Recommendations:  Solid consistency: Dysphagia II Mechanically Altered(with protocol. If pt unable to complete may need to go to puree)   Liquid consistency: Nectar   Medication administration: Meds in puree(crush if able)     Compensatory Swallow Strategies:    Upright as possible for all oral intake, Small bites/sips, Remain upright for 30-45 minutes after meals(allow for 2-3 clearance swallows of foods; avoid mixed consistencies;; oral care post all meals and med pass)      Therapy:  Requires SLP Intervention: Yes at [facility]      Therapy Interventions:  Bolus control exercises, Laryngeal exercises, Patient/Family education, Therapeutic PO trials with SLP, Diet tolerance monitoring, Pharyngeal exercises, Tongue base strengthening, Vital Stim/NMES, Oral care, Effortful swallow, [employer] Protocol, Oral motor exercises, Thermal gustatory stimulation    Prognosis:  Prognosis for safe diet advancement: good  Barriers to reach goals: (guarded fatigue; dysphagia; medical co-morbidities)  Consulted and agree with results and recommendations: Patient(SNF via phone)    Education:  Consulted and agree with results and recommendations: Patient([facility] via phone)  Patient Education Response: Needs reinforcement, Verbalizes understanding            Assessment: Test Data   Pain:  Pain Assessment  Patient Currently in Pain: Denies    General  Cognitive/Behavior  Behavior/Cognition  Behavior/Cognition: Alert; Cooperative;Pleasant mood(vague historian. Slow rate in processing. Assist iin following multiple step commands. Verbally responsive)    Vision/Hearing  Vision  Vision: Impaired(wears glasses)  Hearing  Hearing: Exceptions to [employer]  Hearing Exceptions: Hard of hearing/hearing concerns      Additional assessments   Pt arrived in a w/c.  Pt completed a sit to stand pivot transfer    Consistencies Assessed    Reg solid, Soft solid, Mechanical soft solid, Puree, Honey teaspoon, Nectar cup, Thin cup    Positioning   Upright in Videofluoroscopic Chair; Lateral Plane

## 2019-07-02 ENCOUNTER — OFFICE VISIT (OUTPATIENT)
Dept: ORTHOPEDIC SURGERY | Age: 64
End: 2019-07-02

## 2019-07-02 VITALS — HEIGHT: 69 IN | BODY MASS INDEX: 24.88 KG/M2 | RESPIRATION RATE: 16 BRPM | WEIGHT: 167.99 LBS | HEART RATE: 62 BPM

## 2019-07-02 DIAGNOSIS — S72.21XD CLOSED DISPLACED SUBTROCHANTERIC FRACTURE OF RIGHT FEMUR WITH ROUTINE HEALING, SUBSEQUENT ENCOUNTER: ICD-10-CM

## 2019-07-02 DIAGNOSIS — S42.224D CLOSED 2-PART NONDISPLACED FRACTURE OF SURGICAL NECK OF RIGHT HUMERUS WITH ROUTINE HEALING, SUBSEQUENT ENCOUNTER: Primary | ICD-10-CM

## 2019-07-02 PROCEDURE — 99024 POSTOP FOLLOW-UP VISIT: CPT | Performed by: ORTHOPAEDIC SURGERY

## 2020-10-23 NOTE — PROCEDURES
INSTRUMENTAL SWALLOW REPORT  MODIFIED BARIUM SWALLOW    NAME: Hemal Biswas   : 1955  MRN: [id number]       Date of Eval: 2019     Ordering Physician: Sujatha  Radiologist: April Malloy     Referring Diagnosis: oropharyngeal dysphagia; r 13.12    Hemal Biswas has a past medical history of BPH (benign prostatic hyperplasia) and Seizures ([address] He has a past surgical history that includes Femur fracture surgery (Right, 2019). Current Diet Solid Consistency: Dysphagia III Advanced  Current Diet Liquid Consistency: Thin    Type of Study: Initial MBS      Chart Review:  4/15/2019 CXR:  Impression   Unchanged left-sided rib fractures.  No underlying pneumothorax or pleural   effusion.       Suspect lower right-sided rib fractures, age indeterminate.         2019 Hospitalist note:  70 y. o. male with a past medical history of alcohol dependence and withdrawal seizures  who presented to [facility] he had a mechanical fall. Patient was intoxicated on admission. He was admitted with a right femoral shaft fracture along with humerus fracture and rib fracture. Patient underwent ORIF on 2019. Hospital course was complicated by delirium tremens and acute blood loss anemia along with bronchitis and possible aspiration pneumonia. Patient Complaints/Reason for Referral:  Hemal Biswas was referred for a MBS to assess the efficiency of his/her swallow function, assess for aspiration, and to make recommendations regarding safe dietary consistencies, effective compensatory strategies, and safe eating environment. Patient complaints: patient unreliable historian; medical team concern for increased penetration/aspiration risk    Onset of problem:   Date of Onset: 2019    Subjective  Subjective: Accepted and tolerated MBS in fluoroscopy suite. Patient is lethargic, but verbally responsive, follows simple direction, and oriented to self only.   Concern for increased penetration/aspiration risk d/t cognitive status documented per BSE this date. Pain   Patient Currently in Pain: No    Behavior/Cognition/Vision/Hearing:  Behavior/Cognition: Alert;Confused; Impulsive; Requires cueing;Distractible  Vision Exceptions: Wears glasses at all times  Hearing: Within functional limits    Patient Position: Lateral   Patient Degrees: Fully upright in VSE chair; decreased head control is improved with pillow propped behind head for support  Consistencies Administered: Soft solid;Mechanical soft solid;Puree; Honey cup;Nectar cup; Thin straw    Impressions:  Treatment Dx and ICD 10: oropharyngeal dysphagia; r 13.12    Oral Phase: Moderate oral stage dysphagia characterized by decreased mastication and decreased lingual manipulation which are both compounded by lethargy. Prolonged mastication with solids with concern for excess labor with regular solids which were not administered d/t concern for excess labor. Prolonged, disorganized bolus formation and movement with all trials with reduced bolus cohesion and control and premature bolus loss to the pharynx. Oral residue with all trials is minimized with cued repeat swallow. Pharyngeal: Moderate oral stage dysphagia characterized by delayed swallow, decreased laryngeal elevation, decreased pharyngeal peristalsis. Premature spillage to the valleculae with overflow to the hypopharynx with all trials. Vallecular and pyriform residue with all trials is minimized with cued repeat swallow. Flash penetration with thin. Suspect significantly improved swallow function as mental status improves. Dysphagia Outcome Severity Scale: Level 4: Mild moderate dysphagia- Intermittent supervision/cueing.  One - two diet consistencies restricted  Penetration-Aspiration Scale (PAS): 2 - Material enters the airway, remains above the vocal folds, and is ejected from the airway    Recommended Diet:  Solid consistency: Dysphagia III Advanced  Liquid consistency: Thin  Liquid administration via: Straw;Cup  Medication administration: Meds in puree  Compensatory Swallowing Strategies: Small bites/sips;Upright as possible for all oral intake;Eat/Feed slowly; Swallow 2 times per bite/sip; Total feed    Recommendations/Treatment  Requires SLP Intervention: Yes  Duration/Frequency of Treatment: 3-5x/wk for LOS  Therapeutic Interventions: Patient/Family education; Therapeutic PO trials with SLP; Diet tolerance monitoring; Bolus control exercises    D/C Recommendations: To be determined(potential need for skilled ST upon discharge from acute care)    Education:   Patient Education: Attempted on results/recommendations/plan  Patient Education Response: No evidence of learning;Needs reinforcement    Prognosis  Prognosis for safe diet advancement: guarded  Barriers to reach goals: cognitive deficits;age    Safety Devices in place: Yes  Type of devices: All fall risk precautions in place    Dysphagia Goals: The patient will tolerate recommended diet without observed clinical signs of aspiration; The patient will improve oral preparation phase via bolus control/manipulation exercises to 5/5 each trial.;The patient/caregiver will demonstrate understanding of compensatory strategies for improved swallowing safety. ;The patient will tolerate regular consistency solids 10/10. Oral Preparation / Oral Phase  Oral Phase - Major Contributing Deficits  Poor Mastication: Soft solid;Mechanical soft solid(prolonged; concern for excess labor with regular solid which was not administered d/t fatigue concerns)  Weak Lingual Manipulation: All  Reduced Posterior Propulsion: All  Reduced Bolus Control: All  Decreased Bolus Cohesion: All  Lingual / Palatal Residue: All(minimizes with cued repeat swallow)  Premature Bolus Loss to Pharynx:  All  Reduced Tongue Base Retraction: All    Pharyngeal Phase  Pharyngeal Phase - Major Contributing Deficits  Delayed Swallow Initiation: All  Premature Spillage to Valleculae: All(with overflow to the hypopharynx)  Reduced Pharyngeal Peristalsis: All  Reduced Laryngeal Elevation: All  Shallow Penetration During: Thin straw(flash)  Pharyngeal Residue - Valleculae: All(minimized with repeat swallow)  Pharyngeal Residue - Pyriform: All(minimized with repeat swallow)    Upper Esophageal Phase  Upper Esophageal Screen: (KOKI)      Therapy Time:   Individual   Time In 1400   Time Out 1445   Minutes 45       Sana Ballesteros, [id number] [place] [address], [id number]  Speech-Language Pathologist  4/19/2019, 2:45 PM no

## 2021-08-24 NOTE — PROGRESS NOTES
Patient sleeping in bed. Alert to self only. Surgical dressing changed on right hip per orders. Patient tolerated well. Minimal drainage noted on old dressing. Patient checked and changed for incontinence. Ativan given for CIWA of 12. Telecamera in place. Sling on right arm. Suctioning as needed. Call light and belongings within reach. Bed alarm on. All needs met at this time. Warm/Dry

## 2024-12-06 NOTE — PROGRESS NOTES
Pulmonary Progress Note    Date of Admission: 4/22/2019   LOS: 3 days       CC:  ams    Subjective:  Continues to be confused. ROS:    No nausea     No Vomiting   No chest pain        PHYSICAL EXAM:    Blood pressure (!) 110/53, pulse 105, temperature 99.3 °F (37.4 °C), temperature source Oral, resp. rate 22, height 5' 9\" (1.753 m), weight 167 lb 8.8 oz (76 kg), SpO2 93 %.'  Gen: No distress. ENT:   Resp: No accessory muscle use. No crackles. No wheezes. No rhonchi. CV: Regular rate. Regular rhythm. No murmur or rub. No edema. Skin: Warm, dry, normal texture and turgor. No nodule on exposed extremities. Red left arm   M/S: No cyanosis. No clubbing. No joint deformity. Psych: continues to be intermittently confused.      Medications:    Scheduled Meds:   metroNIDAZOLE  500 mg Intravenous Q8H    sodium chloride flush  10 mL Intravenous 2 times per day    thiamine (VITAMIN B1) IVPB  100 mg Intravenous Q24H    phenytoin (DILANTIN) maintenance dose IVPB  100 mg Intravenous Q6H    sodium chloride flush  10 mL Intravenous 2 times per day    enoxaparin  40 mg Subcutaneous Daily    multivitamin  1 tablet Oral Daily    folic acid  1 mg Oral Daily    cefepime  2 g Intravenous Q12H    QUEtiapine  25 mg Oral BID       Continuous Infusions:   sodium chloride 5 mL/hr at 04/25/19 0958    sodium chloride 5 mL/hr at 04/25/19 0955       PRN Meds:  sodium chloride flush, potassium chloride, sodium chloride flush, magnesium sulfate, haloperidol lactate    Labs reviewed:  CBC:   Recent Labs     04/24/19  0456 04/25/19  0540 04/26/19  0600   WBC 14.3* 10.8 11.5*   HGB 8.9* 7.7* 7.7*   HCT 26.7* 22.6* 23.1*   .9* 101.9* 102.7*    276 273     BMP:   Recent Labs     04/24/19  0456  04/25/19  0540 04/25/19  1110 04/26/19  0600     --  137  --  139   K 2.9*   < > 3.5 4.1 3.5     --  100  --  103   CO2 26  --  27  --  27   PHOS 2.1*  --  2.9  --  3.1   BUN 5*  --  5*  --  5*   CREATININE <0.5*  --  <0.5*  --  <0.5*    < > = values in this interval not displayed. LIVER PROFILE:   No results for input(s): AST, ALT, LIPASE, BILIDIR, BILITOT, ALKPHOS in the last 72 hours. Invalid input(s): AMYLASE,  ALB  PT/INR: No results for input(s): PROTIME, INR in the last 72 hours. APTT: No results for input(s): APTT in the last 72 hours. UA:  No results for input(s): NITRITE, COLORU, PHUR, LABCAST, WBCUA, RBCUA, MUCUS, TRICHOMONAS, YEAST, BACTERIA, CLARITYU, SPECGRAV, LEUKOCYTESUR, UROBILINOGEN, BILIRUBINUR, BLOODU, GLUCOSEU, AMORPHOUS in the last 72 hours. Invalid input(s): Tonya Savin  No results for input(s): PH, PCO2, PO2 in the last 72 hours. Cx:      Films:       Assessment:     Healthcare associated pneumonia, possible MRSA and gram negative pneumonia   Hx EtOH abuse  altered mental status  Cellulitis  Acute hypoxemia, saturation < 90% on room air   Bilateral pleural effusion   Anemia  Rib fracture, left status post fall. SVT in left upper ext. Plan:      hcap  -  Cefepime x 5 days. Monitor left arm. - with Procalcitonin  Not decreasing / mild increase, will add flagyl to cover anaerobes. - sputum and blood culture ordere         Pleural effusion  - very small. Monitor. No intervention         AMS   - improved today. - wernicke encephalopathy at baseline.   - This may be baseline      Anemia  - Macrocytic.  secondary to etoh?  - monitor with MVI  -stable         Nutrition  - ok to start feeding.    - start today. Left arm  - no DVT. SVT.              [address] [facility], [facility] and [phone] Statement Selected

## (undated) DEVICE — BANDAGE COMPR W6INXL10YD ST M E WHITE/BEIGE

## (undated) DEVICE — SPONGE GZ W4XL4IN COT 12 PLY TYP VII WVN C FLD DSGN

## (undated) DEVICE — GLOVE SURG SZ 75 L12IN FNGR THK87MIL WHT LTX FREE

## (undated) DEVICE — DRILL-TIP RECON K-WIRE

## (undated) DEVICE — REAMER SHAFT, MOD.TRINKLE: Brand: BIXCUT

## (undated) DEVICE — 4-PORT MANIFOLD: Brand: NEPTUNE 2

## (undated) DEVICE — RECON LAG SCREW DRILL

## (undated) DEVICE — COVER LT HNDL BLU PLAS

## (undated) DEVICE — Z CONVERTED USE 2271377 BANDAGE COMPR W6INXL5YD EC E REB

## (undated) DEVICE — BANDAGE COMPR W6INXL12FT SMOOTH FOR LIMB EXSANG ESMARCH

## (undated) DEVICE — GARMENT COMPR STD FOR 17IN CALF UNIF THER FLOTRN

## (undated) DEVICE — MAJOR SET UP PK

## (undated) DEVICE — SUTURE VCRL SZ 2-0 L18IN ABSRB UD CT-1 L36MM 1/2 CIR J839D

## (undated) DEVICE — 3M™ COBAN™ NL STERILE NON-LATEX SELF-ADHERENT WRAP, 2084S, 4 IN X 5 YD (10 CM X 4,5 M), 18 ROLLS/CASE: Brand: 3M™ COBAN™

## (undated) DEVICE — PADDING UNDERCAST W4INXL4YD 100% COT CRIMPED FINISH WBRL II

## (undated) DEVICE — STAPLER SKIN H3.9MM WIRE DIA0.58MM CRWN 6.9MM 35 STPL FIX

## (undated) DEVICE — DRAPE C ARM UNIV W41XL74IN CLR PLAS XR VELC CLSR POLY STRP

## (undated) DEVICE — GLOVE SURG SZ 8 L12IN FNGR THK87MIL WHT LTX FREE

## (undated) DEVICE — PAD,ABDOMINAL,8"X7.5",STERILE,LF,1/PK: Brand: MEDLINE

## (undated) DEVICE — GOWN,SIRUS,POLYRNF,BRTHSLV,XL,30/CS: Brand: MEDLINE

## (undated) DEVICE — CHLORAPREP 26ML ORANGE

## (undated) DEVICE — SPONGE LAP W18XL18IN WHT COT 4 PLY FLD STRUNG RADPQ DISP ST

## (undated) DEVICE — STOCKINETTE IMPERV M 9X44IN POLY INNR LAYR COT ORTH EXT

## (undated) DEVICE — DRESSING,GAUZE,XEROFORM,CURAD,5"X9",ST: Brand: CURAD

## (undated) DEVICE — DRAPE,U/SHT,SPLIT,FILM,60X84,STERILE: Brand: MEDLINE

## (undated) DEVICE — ELECTRODE PT RET AD L9FT HI MOIST COND ADH HYDRGEL CORDED

## (undated) DEVICE — KIT OR ROOM TURNOVER W/STRAP

## (undated) DEVICE — DRAPE,SPLIT ,77X120: Brand: MEDLINE

## (undated) DEVICE — LOCKING DRILL

## (undated) DEVICE — SHEET,DRAPE,53X77,STERILE: Brand: MEDLINE

## (undated) DEVICE — SUTURE VCRL SZ 0 L18IN ABSRB UD L36MM CT-1 1/2 CIR J840D

## (undated) DEVICE — GUIDE WIRE, BALL-TIPPED, STERILE

## (undated) DEVICE — 3M™ STERI-DRAPE™ U-DRAPE 1015: Brand: STERI-DRAPE™

## (undated) DEVICE — 3M™ IOBAN™ 2 ANTIMICROBIAL INCISE DRAPE 6650EZ: Brand: IOBAN™ 2

## (undated) DEVICE — SOLUTION IV IRRIG POUR BRL 0.9% SODIUM CHL 2F7124